# Patient Record
Sex: FEMALE | Race: WHITE | NOT HISPANIC OR LATINO | Employment: UNEMPLOYED | URBAN - METROPOLITAN AREA
[De-identification: names, ages, dates, MRNs, and addresses within clinical notes are randomized per-mention and may not be internally consistent; named-entity substitution may affect disease eponyms.]

---

## 2017-03-28 ENCOUNTER — OFFICE VISIT (OUTPATIENT)
Dept: INTERNAL MEDICINE | Facility: CLINIC | Age: 52
End: 2017-03-28
Payer: COMMERCIAL

## 2017-03-28 VITALS
DIASTOLIC BLOOD PRESSURE: 80 MMHG | WEIGHT: 212 LBS | BODY MASS INDEX: 32.13 KG/M2 | SYSTOLIC BLOOD PRESSURE: 110 MMHG | TEMPERATURE: 98.1 F | HEART RATE: 76 BPM | HEIGHT: 68 IN | OXYGEN SATURATION: 98 %

## 2017-03-28 DIAGNOSIS — L81.4 SOLAR LENTIGO: Primary | ICD-10-CM

## 2017-03-28 DIAGNOSIS — N95.2 VAGINAL ATROPHY: ICD-10-CM

## 2017-03-28 PROCEDURE — 99213 OFFICE O/P EST LOW 20 MIN: CPT | Performed by: INTERNAL MEDICINE

## 2017-03-28 RX ORDER — ESTRADIOL 0.1 MG/G
2 CREAM VAGINAL
Qty: 70 G | Refills: 12 | Status: SHIPPED | OUTPATIENT
Start: 2017-03-30 | End: 2018-05-30

## 2017-03-28 NOTE — PROGRESS NOTES
"  SUBJECTIVE:                                                    Joycelyn Laird is a 51 year old female who presents to clinic today for the following health issues:      1. Right cheek discoloration:  Possibly 2 spots, one near lateral eye, not sure if another lower on the cheek, over 6 months or so. Seems stable, can't feel it.   2. Questions about vaginal atrophy. She has not been regular with estrace cream. She does have discomfort, recently had a little mucus with streaks of blood. No other bleeding or dark discharge.           Patient Active Problem List   Diagnosis     CARDIOVASCULAR SCREENING; LDL GOAL LESS THAN 160     Primary localized osteoarthrosis, ankle and foot     Fatty liver     Eczema     Current Outpatient Prescriptions   Medication Sig Dispense Refill     Flaxseed, Linseed, (FLAXSEED OIL) 1200 MG CAPS Take by mouth every other day        estradiol (ESTRACE VAGINAL) 0.1 MG/GM vaginal cream Place 2 g vaginally twice a week 70 g 12     fish oil-omega-3 fatty acids (OMEGA 3) 1000 MG capsule Take 1 g by mouth every other day  90 capsule 3     MULTIVITAMIN TABS   OR        clobetasol (TEMOVATE) 0.05 % cream Apply topically as needed       fluocinonide (LIDEX) 0.05 % cream Apply small amount to affected area bid as needed 30 g 2            Reviewed and updated as needed this visit by clinical staff  Tobacco  Allergies  Problems  Med Hx  Surg Hx  Fam Hx  Soc Hx      Reviewed and updated as needed this visit by Provider         ROS:  negative    OBJECTIVE:                                                    /80  Pulse 76  Temp 98.1  F (36.7  C) (Oral)  Ht 5' 8\" (1.727 m)  Wt 212 lb (96.2 kg)  LMP 10/29/2011  SpO2 98%  BMI 32.23 kg/m2  Body mass index is 32.23 kg/(m^2).    Brown macule right face near outer eye  No other discolorations       ASSESSMENT/PLAN:                                                            1. Vaginal atrophy  Discussed this problem, various treatments and that " the cream has to be used consistently. She will restart.   - estradiol (ESTRACE VAGINAL) 0.1 MG/GM cream; Place 2 g vaginally twice a week  Dispense: 70 g; Refill: 12    2. Solar lentigo  Reassured benign, if any lesions become rough, refer derm.           Gauri Early MD  [facility]

## 2017-03-28 NOTE — NURSING NOTE
"Chief Complaint   Patient presents with     Derm Problem       Initial /80  Pulse 76  Temp 98.1  F (36.7  C) (Oral)  Ht 5' 8\" (1.727 m)  Wt 212 lb (96.2 kg)  LMP 10/29/2011  SpO2 98%  BMI 32.23 kg/m2 Estimated body mass index is 32.23 kg/(m^2) as calculated from the following:    Height as of this encounter: 5' 8\" (1.727 m).    Weight as of this encounter: 212 lb (96.2 kg).  Medication Reconciliation: complete    "

## 2017-03-28 NOTE — MR AVS SNAPSHOT
"              After Visit Summary   3/28/2017    Joycelyn Laird    MRN: [id number]           Patient Information     Date Of Birth          1965        Visit Information        Provider Department      3/28/2017 10:00 AM Gauri Early MD [facility]        Today's Diagnoses     Solar lentigo    -  1    Vaginal atrophy           Follow-ups after your visit        Who to contact     If you have questions or need follow up information about today's clinic visit or your schedule please contact [facility] directly at [phone].  Normal or non-critical lab and imaging results will be communicated to you by MyChart, letter or phone within 4 business days after the clinic has received the results. If you do not hear from us within 7 days, please contact the clinic through [employer] or phone. If you have a critical or abnormal lab result, we will notify you by phone as soon as possible.  Submit refill requests through [employer] or call your pharmacy and they will forward the refill request to us. Please allow 3 business days for your refill to be completed.          Additional Information About Your Visit        MyChart Information     [employer] gives you secure access to your electronic health record. If you see a primary care provider, you can also send messages to your care team and make appointments. If you have questions, please call your primary care clinic.  If you do not have a primary care provider, please call [phone] and they will assist you.        Care EveryWhere ID     This is your Care EveryWhere ID. This could be used by other organizations to access your [place] medical records  [id number]        Your Vitals Were     Pulse Temperature Height Last Period Pulse Oximetry BMI (Body Mass Index)    76 98.1  F (36.7  C) (Oral) 5' 8\" (1.727 m) 10/29/2011 98% 32.23 kg/m2       Blood Pressure from Last 3 Encounters:   03/28/17 110/80   07/18/16 90/70   10/06/15 120/84    " Weight from Last 3 Encounters:   03/28/17 212 lb (96.2 kg)   07/18/16 220 lb (99.8 kg)   10/06/15 221 lb (100.2 kg)              Today, you had the following     No orders found for display         Where to get your medicines      These medications were sent to [id number] - [place], [place] - [address] [facility] [address] access [address], [place] [address]    Hours:  24-hours Phone:  [phone]     estradiol 0.1 MG/GM cream          Primary Care Provider Office Phone # Fax #    Gauri Early [phone]       [facility] [address] [facility] [address]  [facility] [address]        Thank you!     Thank you for choosing [facility]  for your care. Our goal is always to provide you with excellent care. Hearing back from our patients is one way we can continue to improve our services. Please take a few minutes to complete the written survey that you may receive in the mail after your visit with us. Thank you!             Your Updated Medication List - Protect others around you: Learn how to safely use, store and throw away your medicines at www.disposemymeds.org.          This list is accurate as of: 3/28/17  1:31 PM.  Always use your most recent med list.                   Brand Name Dispense Instructions for use    clobetasol 0.05 % cream    TEMOVATE     Apply topically as needed       estradiol 0.1 MG/GM cream   Start taking on:  3/30/2017    ESTRACE VAGINAL    70 g    Place 2 g vaginally twice a week       fish oil-omega-3 fatty acids 1000 MG capsule     90 capsule    Take 1 g by mouth every other day       Flaxseed Oil 1200 MG Caps      Take by mouth every other day       fluocinonide 0.05 % cream    LIDEX    30 g    Apply small amount to affected area bid as needed       MULTIVITAMIN TABS   OR

## 2017-05-12 ENCOUNTER — OFFICE VISIT (OUTPATIENT)
Dept: FAMILY MEDICINE | Facility: CLINIC | Age: 52
End: 2017-05-12
Payer: COMMERCIAL

## 2017-05-12 VITALS
TEMPERATURE: 98.1 F | SYSTOLIC BLOOD PRESSURE: 120 MMHG | BODY MASS INDEX: 32.43 KG/M2 | DIASTOLIC BLOOD PRESSURE: 84 MMHG | OXYGEN SATURATION: 98 % | HEART RATE: 71 BPM | HEIGHT: 68 IN | WEIGHT: 214 LBS

## 2017-05-12 DIAGNOSIS — H81.399 PERIPHERAL VERTIGO, UNSPECIFIED LATERALITY: Primary | ICD-10-CM

## 2017-05-12 PROCEDURE — 99213 OFFICE O/P EST LOW 20 MIN: CPT | Performed by: FAMILY MEDICINE

## 2017-05-12 RX ORDER — ONDANSETRON 4 MG/1
4 TABLET, FILM COATED ORAL EVERY 8 HOURS PRN
Qty: 15 TABLET | Refills: 1 | Status: SHIPPED | OUTPATIENT
Start: 2017-05-12 | End: 2017-11-02

## 2017-05-12 RX ORDER — MECLIZINE HYDROCHLORIDE 25 MG/1
25 TABLET ORAL EVERY 6 HOURS PRN
Qty: 30 TABLET | Refills: 1 | COMMUNITY
Start: 2017-05-12 | End: 2017-11-02

## 2017-05-12 NOTE — MR AVS SNAPSHOT
After Visit Summary   5/12/2017    Joycelyn Laird    MRN: [id number]           Patient Information     Date Of Birth          1965        Visit Information        Provider Department      5/12/2017 9:00 AM Kvein Nguyen MD [facility]        Today's Diagnoses     Peripheral vertigo, unspecified laterality    -  1      Care Instructions                Positional Vertigo          What is positional vertigo?   Positional vertigo is an inner ear problem. It causes brief but sometimes severe feelings of spinning. Some people feel that their head or body is spinning. Others feel the room is spinning. People often say they are dizzy, but dizzy is a very general term. Vertigo, on the other hand, is the very specific feeling of uncontrollable spinning.   Symptoms of positional vertigo happen suddenly when you change the position of your head.   How does it occur?   In the inner part of your ear are 3 semicircular canals. Movement of the fluid in these canals helps your brain maintain your balance and know what position you are in (for example, standing up, lying down, or standing on your head).   Sometimes small crystals of calcium develop and float in the fluid in the inner ear. This can happen after a head injury, with a severe cold, or simply as a part of normal aging. The crystals can cause vertigo when you change head position and they strike against nerve endings in the semicircular canals. Usually the calcium crystals dissolve in a few weeks and stop causing vertigo. However, sometimes the crystals do not dissolve and the vertigo returns from time to time.   What are the symptoms?   A sudden feeling that you are spinning, or that the room is spinning, is the main symptom. You may feel the vertigo when you first wake up. It may seem that any turn of your head brings on brief but intense spells of vertigo. It may happen when you tilt your head, look up or down, or roll over in bed.    You may have nausea and vomiting along with the vertigo. Even if a spell of vertigo is brief, you may have a feeling of queasiness for several minutes or even hours afterward.   How is it diagnosed?   Your healthcare provider will ask about your symptoms and examine you. You may also be given a Panna Maria-Hallpike position test.   You start the Panna Maria-Hallpike test by sitting upright on the examining table. Your healthcare provider slowly brings your head down over the edge of the table and turns your head to one side. If you have positional vertigo, your provider will see your eyes making fast, jerky movements called nystagmus. If no nystagmus is seen, your provider will repeat the test, this time turning your head to the opposite side, to test the other inner ear. If you then have nystagmus and vertigo, the ear that is pointing toward the floor is the one causing the problem. The nystagmus and vertigo will slow down and stop after 15 to 20 seconds. If you do not move your head, no more symptoms will occur. When you sit back up, you will have vertigo again, but for a shorter time.   Other tests you may have are:   an ear exam   an audiogram to check your hearing   a test of your nerve responses   an electronystagmogram (ENG) test.   How is it treated?   Mild vertigo is often treated with medicine. The most common medicine for this problem is meclizine. It is taken up to 4 times a day for the vertigo and nausea or vomiting. One of the problems with this medicine is that it causes drowsiness. This is not as much of a problem if you have severe vertigo, which usually requires bed rest. Then the medicine can help you sleep and get relief from the vertigo while you sleep.   Your healthcare provider may recommend techniques that use gravity to move the crystals away from the nerve endings into an area of the inner ear that won't cause any problems. These are called repositioning techniques.   One repositioning technique is the  Epley maneuver. It can be very helpful. Your healthcare provider will move your head into 4 positions. You will hold each position for about 30 seconds.   Your healthcare provider may also suggest that you do Etienne-Daroff exercises. Your provider may recommend that you do these exercises 3 times a day for 2 weeks. To do these exercises:   Start by sitting upright on your bed.   Lie on your left side, with your head angled upward about senior care. (Imagine that you are looking at the head of someone standing about 6 feet in front of you.) Stay in this position for 30 seconds, or, if you are having vertigo, until the vertigo stops.   Return to the sitting position for 30 seconds.   Lie on your right side, and follow the same routine.   Your healthcare provider may refer you to a physical therapist to learn and practice these repositioning techniques.   Rarely, when repositioning techniques don't help and the vertigo has not gone away after a few weeks, severe cases may eventually require surgery.   How long will the effects last?   Even without treatment, positional vertigo usually goes away within several weeks. Sometimes it recurs despite treatment.   How do I take care of myself?   If your vertigo is mild, you may be able to continue your usual activities, especially if you have opportunities to sit and rest when you have vertigo.   If your vertigo does not allow you to continue your usual routine, you should rest at home.   Use medicine as prescribed by your healthcare provider to help stop symptoms of dizziness, nausea, and vomiting.   Follow your instructions for using the repositioning techniques.   Do not try to drive, operate tools or machinery, or do other tasks, even cooking, that could endanger yourself or others if you suddenly become dizzy.   Follow your healthcare provider's recommendations for follow-up visits.   Contact your healthcare provider if:   Your symptoms seem to be getting worse, more  frequent, or longer lasting.   You develop new symptoms, such as a loss of hearing or severe headache.     Published by [employer].  This content is reviewed periodically and is subject to change as new health information becomes available. The information is intended to inform and educate and is not a replacement for medical evaluation, advice, diagnosis or treatment by a healthcare professional.   Developed by [employer].   ? 2010 [facility] and/or its affiliates. All Rights Reserved.   Copyright   [employer] Systems 2011  Adult Health Advisor               Follow-ups after your visit        Who to contact     If you have questions or need follow up information about today's clinic visit or your schedule please contact [facility] directly at [phone].  Normal or non-critical lab and imaging results will be communicated to you by MyChart, letter or phone within 4 business days after the clinic has received the results. If you do not hear from us within 7 days, please contact the clinic through [employer] or phone. If you have a critical or abnormal lab result, we will notify you by phone as soon as possible.  Submit refill requests through [employer] or call your pharmacy and they will forward the refill request to us. Please allow 3 business days for your refill to be completed.          Additional Information About Your Visit        MyChart Information     [employer] gives you secure access to your electronic health record. If you see a primary care provider, you can also send messages to your care team and make appointments. If you have questions, please call your primary care clinic.  If you do not have a primary care provider, please call [phone] and they will assist you.        Care EveryWhere ID     This is your Care EveryWhere ID. This could be used by other organizations to access your [place] medical records  [id number]        Your Vitals Were     Pulse Temperature Height  "Last Period Pulse Oximetry BMI (Body Mass Index)    71 98.1  F (36.7  C) (Oral) 5' 8\" (1.727 m) 10/29/2011 98% 32.54 kg/m2       Blood Pressure from Last 3 Encounters:   05/12/17 120/84   03/28/17 110/80   07/18/16 90/70    Weight from Last 3 Encounters:   05/12/17 214 lb (97.1 kg)   03/28/17 212 lb (96.2 kg)   07/18/16 220 lb (99.8 kg)              Today, you had the following     No orders found for display         Today's Medication Changes          These changes are accurate as of: 5/12/17  9:23 AM.  If you have any questions, ask your nurse or doctor.               Start taking these medicines.        Dose/Directions    meclizine 25 MG tablet   Commonly known as:  ANTIVERT   Used for:  Peripheral vertigo, unspecified laterality   Started by:  Kevin Nguyen MD        Dose:  25 mg   Take 1 tablet (25 mg) by mouth every 6 hours as needed for dizziness   Quantity:  30 tablet   Refills:  1       ondansetron 4 MG tablet   Commonly known as:  ZOFRAN   Used for:  Peripheral vertigo, unspecified laterality   Started by:  Kevin Nguyen MD        Dose:  4 mg   Take 1 tablet (4 mg) by mouth every 8 hours as needed for nausea   Quantity:  15 tablet   Refills:  1            Where to get your medicines      These medications were sent to [facility] [address]  [address] [address]     Phone:  [phone]     ondansetron 4 MG tablet         Some of these will need a paper prescription and others can be bought over the counter.  Ask your nurse if you have questions.     You don't need a prescription for these medications     meclizine 25 MG tablet                Primary Care Provider Office Phone # Fax #    Gauri Early [phone]       [facility] [address] [facility] [address]  [facility] [address]        Thank you!     Thank you for choosing [facility]  for your care. Our goal is always to provide you " with excellent care. Hearing back from our patients is one way we can continue to improve our services. Please take a few minutes to complete the written survey that you may receive in the mail after your visit with us. Thank you!             Your Updated Medication List - Protect others around you: Learn how to safely use, store and throw away your medicines at www.disposemymeds.org.          This list is accurate as of: 5/12/17  9:23 AM.  Always use your most recent med list.                   Brand Name Dispense Instructions for use    clobetasol 0.05 % cream    TEMOVATE     Apply topically as needed       estradiol 0.1 MG/GM cream    ESTRACE VAGINAL    70 g    Place 2 g vaginally twice a week       fish oil-omega-3 fatty acids 1000 MG capsule     90 capsule    Take 1 g by mouth every other day       Flaxseed Oil 1200 MG Caps      Take by mouth every other day       fluocinonide 0.05 % cream    LIDEX    30 g    Apply small amount to affected area bid as needed       meclizine 25 MG tablet    ANTIVERT    30 tablet    Take 1 tablet (25 mg) by mouth every 6 hours as needed for dizziness       MULTIVITAMIN TABS   OR          ondansetron 4 MG tablet    ZOFRAN    15 tablet    Take 1 tablet (4 mg) by mouth every 8 hours as needed for nausea

## 2017-05-12 NOTE — NURSING NOTE
"Chief Complaint   Patient presents with     Dizziness       Initial /84 (BP Location: Left arm, Patient Position: Chair, Cuff Size: Adult Large)  Pulse 71  Temp 98.1  F (36.7  C) (Oral)  Ht 5' 8\" (1.727 m)  Wt 214 lb (97.1 kg)  LMP 10/29/2011  SpO2 98%  BMI 32.54 kg/m2 Estimated body mass index is 32.54 kg/(m^2) as calculated from the following:    Height as of this encounter: 5' 8\" (1.727 m).    Weight as of this encounter: 214 lb (97.1 kg).  Medication Reconciliation: complete  "

## 2017-05-12 NOTE — PROGRESS NOTES
SUBJECTIVE:                                                    Joycelyn Laird is a 51 year old female who presents to clinic today for the following health issues:    Dizziness     Onset: today, came on suddenly    Description:   Do you feel faint:  YES- more dizzy  Does it feel like the surroundings (bed, room) are moving: YES  Unsteady/off balance: YES  Have you passed out or fallen: YES  Did you go to bed last night feeling dizzy?: no  Dizziness worsened with movement: YES    Intensity: moderate    Progression of Symptoms:  Pt feels better than this morning    Accompanying Signs & Symptoms:  Heart palpitations: no   Nausea, vomiting: YES, this morning  Weakness in arms or legs: no   Fatigue: YES  Vision or speech changes: YES  Ringing in ears (Tinnitus): YES, always has had some tinnitus  Hearing Loss: YES, runs in family  Fever: no   History:   Head trauma/concussion hx: no   Previous similar symptoms: no   Recent bleeding history: no     Precipitating factors:   Worse with activity or head movement: YES  Any new medications (BP?): no   Alcohol/drug abuse/withdrawal: no     Alleviating factors:   Does staying in a fixed position give relief:  YES       Therapies Tried and outcome: neck stretches- mild relief    Problem list and histories reviewed & adjusted, as indicated.  Additional history: as documented    ROS:  Constitutional, HEENT, cardiovascular, pulmonary, GI, , musculoskeletal, neuro, skin, endocrine and psych systems are negative, except as otherwise noted.    This document serves as a record of the services and decisions personally performed and made by Kevin Nguyen MD. It was created on his behalf by Kaia Gallegos, a trained medical scribe. The creation of this document is based the provider's statements to the medical scribe.  Kaia Gallegos   OBJECTIVE:                                                    /84 (BP Location: Left arm, Patient Position: Chair, Cuff Size: Adult Large)   "Pulse 71  Temp 98.1  F (36.7  C) (Oral)  Ht 1.727 m (5' 8\")  Wt 97.1 kg (214 lb)  LMP 10/29/2011  SpO2 98%  BMI 32.54 kg/m2 Body mass index is 32.54 kg/(m^2).   GENERAL: healthy, alert, well nourished, well hydrated, no distress  EYES: Eyes grossly normal to inspection, extraocular movements - intact  HENT: ear canals- normal; TMs- normal; Nose- normal; Mouth- no ulcers, no lesions  NECK: no tenderness, no adenopathy, no asymmetry, no masses, no stiffness; thyroid- normal to palpation  NEURO: increased vertigo symptoms with laying down to the right, otherwise, strength and tone- normal, sensory exam- grossly normal, mentation- intact, speech- normal, reflexes- symmetric  PSYCH: Alert and oriented times 3; speech- coherent , normal rate and volume; able to articulate logical thoughts, able to abstract reason, no tangential thoughts, no hallucinations or delusions, affect- normal  Diagnostic test results: none      ASSESSMENT/PLAN:         Joycelyn was seen today for dizziness.    Diagnoses and all orders for this visit:    Peripheral vertigo, unspecified laterality: Advised to move slowly, take meclizine, and take antinausea medication as needed.   -     ondansetron (ZOFRAN) 4 MG tablet; Take 1 tablet (4 mg) by mouth every 8 hours as needed for nausea  -     meclizine (ANTIVERT) 25 MG tablet; Take 1 tablet (25 mg) by mouth every 6 hours as needed for dizziness    Risks, benefits and alternatives of treatments discussed. Plan agreed on.      Followup: prn    Will call, return to clinic, or go to ED if worsening or symptoms not improving as discussed.    See patient instructions.     BMI:   Estimated body mass index is 32.54 kg/(m^2) as calculated from the following:    Height as of this encounter: 1.727 m (5' 8\").    Weight as of this encounter: 97.1 kg (214 lb).   Weight management plan: Discussed healthy diet and exercise guidelines and patient will follow up in 6 months in clinic to re-evaluate.      The patient " has no Health Maintenance topics of status Overdue, Due On, or Due Soon    Health maintenance reviewed/updated? Yes    The information in this document, created by the medical scribe for me, accurately reflects the services I personally performed and the decisions made by me. I have reviewed and approved this document for accuracy prior to leaving the patient care area.  Kevin Nguyen MD May 12, 2017 7:39 AM        Tacos Nguyen MD

## 2017-05-12 NOTE — PATIENT INSTRUCTIONS
Positional Vertigo          What is positional vertigo?   Positional vertigo is an inner ear problem. It causes brief but sometimes severe feelings of spinning. Some people feel that their head or body is spinning. Others feel the room is spinning. People often say they are dizzy, but dizzy is a very general term. Vertigo, on the other hand, is the very specific feeling of uncontrollable spinning.   Symptoms of positional vertigo happen suddenly when you change the position of your head.   How does it occur?   In the inner part of your ear are 3 semicircular canals. Movement of the fluid in these canals helps your brain maintain your balance and know what position you are in (for example, standing up, lying down, or standing on your head).   Sometimes small crystals of calcium develop and float in the fluid in the inner ear. This can happen after a head injury, with a severe cold, or simply as a part of normal aging. The crystals can cause vertigo when you change head position and they strike against nerve endings in the semicircular canals. Usually the calcium crystals dissolve in a few weeks and stop causing vertigo. However, sometimes the crystals do not dissolve and the vertigo returns from time to time.   What are the symptoms?   A sudden feeling that you are spinning, or that the room is spinning, is the main symptom. You may feel the vertigo when you first wake up. It may seem that any turn of your head brings on brief but intense spells of vertigo. It may happen when you tilt your head, look up or down, or roll over in bed.   You may have nausea and vomiting along with the vertigo. Even if a spell of vertigo is brief, you may have a feeling of queasiness for several minutes or even hours afterward.   How is it diagnosed?   Your healthcare provider will ask about your symptoms and examine you. You may also be given a Servando-Hallpike position test.   You start the Temple-Hallpike test by sitting upright  on the examining table. Your healthcare provider slowly brings your head down over the edge of the table and turns your head to one side. If you have positional vertigo, your provider will see your eyes making fast, jerky movements called nystagmus. If no nystagmus is seen, your provider will repeat the test, this time turning your head to the opposite side, to test the other inner ear. If you then have nystagmus and vertigo, the ear that is pointing toward the floor is the one causing the problem. The nystagmus and vertigo will slow down and stop after 15 to 20 seconds. If you do not move your head, no more symptoms will occur. When you sit back up, you will have vertigo again, but for a shorter time.   Other tests you may have are:   an ear exam   an audiogram to check your hearing   a test of your nerve responses   an electronystagmogram (ENG) test.   How is it treated?   Mild vertigo is often treated with medicine. The most common medicine for this problem is meclizine. It is taken up to 4 times a day for the vertigo and nausea or vomiting. One of the problems with this medicine is that it causes drowsiness. This is not as much of a problem if you have severe vertigo, which usually requires bed rest. Then the medicine can help you sleep and get relief from the vertigo while you sleep.   Your healthcare provider may recommend techniques that use gravity to move the crystals away from the nerve endings into an area of the inner ear that won't cause any problems. These are called repositioning techniques.   One repositioning technique is the Epley maneuver. It can be very helpful. Your healthcare provider will move your head into 4 positions. You will hold each position for about 30 seconds.   Your healthcare provider may also suggest that you do Etienne-Daroff exercises. Your provider may recommend that you do these exercises 3 times a day for 2 weeks. To do these exercises:   Start by sitting upright on your bed.    Lie on your left side, with your head angled upward about FDC. (Imagine that you are looking at the head of someone standing about 6 feet in front of you.) Stay in this position for 30 seconds, or, if you are having vertigo, until the vertigo stops.   Return to the sitting position for 30 seconds.   Lie on your right side, and follow the same routine.   Your healthcare provider may refer you to a physical therapist to learn and practice these repositioning techniques.   Rarely, when repositioning techniques don't help and the vertigo has not gone away after a few weeks, severe cases may eventually require surgery.   How long will the effects last?   Even without treatment, positional vertigo usually goes away within several weeks. Sometimes it recurs despite treatment.   How do I take care of myself?   If your vertigo is mild, you may be able to continue your usual activities, especially if you have opportunities to sit and rest when you have vertigo.   If your vertigo does not allow you to continue your usual routine, you should rest at home.   Use medicine as prescribed by your healthcare provider to help stop symptoms of dizziness, nausea, and vomiting.   Follow your instructions for using the repositioning techniques.   Do not try to drive, operate tools or machinery, or do other tasks, even cooking, that could endanger yourself or others if you suddenly become dizzy.   Follow your healthcare provider's recommendations for follow-up visits.   Contact your healthcare provider if:   Your symptoms seem to be getting worse, more frequent, or longer lasting.   You develop new symptoms, such as a loss of hearing or severe headache.     Published by [employer].  This content is reviewed periodically and is subject to change as new health information becomes available. The information is intended to inform and educate and is not a replacement for medical evaluation, advice, diagnosis or treatment by a healthcare  professional.   Developed by [employer].   ? 2010 [employer] and/or its affiliates. All Rights Reserved.   Copyright   [employer] 2011  Adult Health Advisor

## 2017-05-13 ENCOUNTER — TELEPHONE (OUTPATIENT)
Dept: FAMILY MEDICINE | Facility: CLINIC | Age: 52
End: 2017-05-13

## 2017-05-13 DIAGNOSIS — H81.399 PERIPHERAL VERTIGO, UNSPECIFIED LATERALITY: Primary | ICD-10-CM

## 2017-05-13 NOTE — TELEPHONE ENCOUNTER
Reason for Call:  Joycelyn saw Dr Nguyen yesterday for vertigo. They discussed possibly placing a physical therapy referral. Joycelyn would like to pursue that plan. She would like to go to one today as she is leaving town later today or tomorrow. Advised that would be highly unlikely on such short notice. Dr Nguyen is in the office today. Will also send "[employer]" message to review this encounter and advise.    Best phone number to reach pt at is: [phone]  Ok to leave a message with medical info? yes    Leslie Mandel  Patient Representative

## 2017-07-21 ENCOUNTER — HOSPITAL ENCOUNTER (OUTPATIENT)
Dept: MAMMOGRAPHY | Facility: CLINIC | Age: 52
Discharge: HOME | End: 2017-07-21
Attending: INTERNAL MEDICINE | Admitting: INTERNAL MEDICINE
Payer: COMMERCIAL

## 2017-07-21 DIAGNOSIS — Z12.31 VISIT FOR SCREENING MAMMOGRAM: ICD-10-CM

## 2017-07-21 PROCEDURE — G0202 SCR MAMMO BI INCL CAD: HCPCS

## 2017-11-02 ENCOUNTER — OFFICE VISIT (OUTPATIENT)
Dept: INTERNAL MEDICINE | Facility: CLINIC | Age: 52
End: 2017-11-02
Payer: COMMERCIAL

## 2017-11-02 VITALS
DIASTOLIC BLOOD PRESSURE: 89 MMHG | HEART RATE: 85 BPM | RESPIRATION RATE: 16 BRPM | OXYGEN SATURATION: 97 % | SYSTOLIC BLOOD PRESSURE: 128 MMHG | HEIGHT: 68 IN | TEMPERATURE: 98.3 F | BODY MASS INDEX: 34.95 KG/M2 | WEIGHT: 230.6 LBS

## 2017-11-02 DIAGNOSIS — Z00.00 ENCOUNTER FOR ROUTINE ADULT HEALTH EXAMINATION WITHOUT ABNORMAL FINDINGS: Primary | ICD-10-CM

## 2017-11-02 DIAGNOSIS — Z12.11 SPECIAL SCREENING FOR MALIGNANT NEOPLASMS, COLON: ICD-10-CM

## 2017-11-02 DIAGNOSIS — N95.0 POST-MENOPAUSAL BLEEDING: ICD-10-CM

## 2017-11-02 DIAGNOSIS — L30.9 ECZEMA, UNSPECIFIED TYPE: ICD-10-CM

## 2017-11-02 DIAGNOSIS — B07.0 PLANTAR WART: ICD-10-CM

## 2017-11-02 PROCEDURE — 80061 LIPID PANEL: CPT | Performed by: INTERNAL MEDICINE

## 2017-11-02 PROCEDURE — 99396 PREV VISIT EST AGE 40-64: CPT | Performed by: INTERNAL MEDICINE

## 2017-11-02 PROCEDURE — 80053 COMPREHEN METABOLIC PANEL: CPT | Performed by: INTERNAL MEDICINE

## 2017-11-02 PROCEDURE — 36415 COLL VENOUS BLD VENIPUNCTURE: CPT | Performed by: INTERNAL MEDICINE

## 2017-11-02 RX ORDER — FLUOCINONIDE 0.5 MG/G
CREAM TOPICAL
Qty: 30 G | Refills: 2 | Status: SHIPPED | OUTPATIENT
Start: 2017-11-02 | End: 2024-04-10

## 2017-11-02 NOTE — MR AVS SNAPSHOT
After Visit Summary   11/2/2017    Joycelyn Laird    MRN: [id number]           Patient Information     Date Of Birth          1965        Visit Information        Provider Department      11/2/2017 8:40 AM Gauri Early MD [facility]        Today's Diagnoses     Encounter for routine adult health examination without abnormal findings    -  1    Special screening for malignant neoplasms, colon        Eczema, unspecified type        Post-menopausal bleeding          Care Instructions      PREVENTIVE HEALTH RECOMMENDATIONS:     Vaccines: Get a flu shot each year. Get a tetanus shot every 10 years.     Exercise for at least 150 minutes a week (an average of 30 minutes a day, 5 days of the week). This will help you control your weight and prevent disease.    Limit alcohol to one drink per day.    No smoking.     Wear sunscreen to prevent skin cancer.     See your dentist twice a year for an exam and cleaning.    Try to get Calcium 1200 mg total per day. It is best to not take it all at once. Try to get Vitamin D at least 1000 units per day.    BMI or Body Mass Index is a way of indicating weight and health risk for cardiovascular diseases, high blood pressure, diabetes.   Definitions:    Underweight is less than 18.5 and will be associated with health risk.   Normal BMI is 18.5 to 25   Overweight is 25-29   Obesity is 30 or greater   Morbid Obesity is 40 or greater or 35 or greater with diabetes, high blood pressure or elevated cholesterol  Obesity and Morbid Obesity are associated with higher health risks. Lowering calories, exercising more may lower your BMI and even small decreases can have positive impact on lowering health risks.   Your Body mass index is 35.06 kg/(m^2)..             Follow-ups after your visit        Additional Services     GASTROENTEROLOGY ADULT REF PROCEDURE ONLY       Last Lab Result: Creatinine (mg/dL)       Date                     Value                  07/18/2016               0.70             ----------  There is no height or weight on file to calculate BMI.     Needed:  No  Language:  English    Patient will be contacted to schedule procedure.     Please be aware that coverage of these services is subject to the terms and limitations of your health insurance plan.  Call member services at your health plan with any benefit or coverage questions.  Any procedures must be performed at a [place] facility OR coordinated by your clinic's referral office.    Please bring the following with you to your appointment:    (1) Any X-Rays, CTs or MRIs which have been performed.  Contact the facility where they were done to arrange for  prior to your scheduled appointment.    (2) List of current medications   (3) This referral request   (4) Any documents/labs given to you for this referral                  Future tests that were ordered for you today     Open Future Orders        Priority Expected Expires Ordered    US Pelvic Complete w Transvaginal Routine  11/2/2018 11/2/2017            Who to contact     If you have questions or need follow up information about today's clinic visit or your schedule please contact [facility] directly at [phone].  Normal or non-critical lab and imaging results will be communicated to you by MyChart, letter or phone within 4 business days after the clinic has received the results. If you do not hear from us within 7 days, please contact the clinic through [employer] or phone. If you have a critical or abnormal lab result, we will notify you by phone as soon as possible.  Submit refill requests through [employer] or call your pharmacy and they will forward the refill request to us. Please allow 3 business days for your refill to be completed.          Additional Information About Your Visit        [employer] Information     [employer] gives you secure access to your electronic health record. If you see a primary care  "provider, you can also send messages to your care team and make appointments. If you have questions, please call your primary care clinic.  If you do not have a primary care provider, please call [phone] and they will assist you.        Care EveryWhere ID     This is your Care EveryWhere ID. This could be used by other organizations to access your [facility] medical records  [id number]        Your Vitals Were     Pulse Temperature Respirations Height Last Period Pulse Oximetry    85 98.3  F (36.8  C) (Oral) 16 5' 8\" (1.727 m) 10/29/2011 97%    BMI (Body Mass Index)                   35.06 kg/m2            Blood Pressure from Last 3 Encounters:   11/02/17 128/90   05/12/17 120/84   03/28/17 110/80    Weight from Last 3 Encounters:   11/02/17 230 lb 9.6 oz (104.6 kg)   05/12/17 214 lb (97.1 kg)   03/28/17 212 lb (96.2 kg)              We Performed the Following     Comprehensive metabolic panel     GASTROENTEROLOGY ADULT REF PROCEDURE ONLY     Lipid panel reflex to direct LDL Fasting          Today's Medication Changes          These changes are accurate as of: 11/2/17  9:18 AM.  If you have any questions, ask your nurse or doctor.               Stop taking these medicines if you haven't already. Please contact your care team if you have questions.     meclizine 25 MG tablet   Commonly known as:  ANTIVERT   Stopped by:  Gauri Early MD           ondansetron 4 MG tablet   Commonly known as:  ZOFRAN   Stopped by:  Gauri Early MD                Where to get your medicines      These medications were sent to [facility] [id number] [place] [address] [address] AT [facility] [address], [facility] [address]    Hours:  24-hours Phone:  [phone]     fluocinonide 0.05 % cream                Primary Care Provider Office Phone # Fax #    Gauri Early [phone]       [address] [place] [address]        Equal Access to Services     REEMA THRASHER " AH: Alex healy Myrakevin, wajackelinda luqadaha, qaybta kamanish jmmagnoliadreasofia joel hayumer ledesmaeliseolizzie ludwig. So [facility] [phone].    ATENCIÓN: Si habla español, tiene a alonzo disposición servicios gratuitos de asistencia lingüística. Llame al [phone].    We comply with applicable federal civil rights laws and [place] laws. We do not discriminate on the basis of race, color, national origin, age, disability, sex, sexual orientation, or gender identity.            Thank you!     Thank you for choosing [facility]  for your care. Our goal is always to provide you with excellent care. Hearing back from our patients is one way we can continue to improve our services. Please take a few minutes to complete the written survey that you may receive in the mail after your visit with us. Thank you!             Your Updated Medication List - Protect others around you: Learn how to safely use, store and throw away your medicines at www.disposemymeds.org.          This list is accurate as of: 11/2/17  9:18 AM.  Always use your most recent med list.                   Brand Name Dispense Instructions for use Diagnosis    clobetasol 0.05 % cream    TEMOVATE     Apply topically as needed        estradiol 0.1 MG/GM cream    ESTRACE VAGINAL    70 g    Place 2 g vaginally twice a week    Vaginal atrophy       fish oil-omega-3 fatty acids 1000 MG capsule     90 capsule    Take 1 g by mouth every other day        Flaxseed Oil 1200 MG Caps      Take by mouth every other day        fluocinonide 0.05 % cream    LIDEX    30 g    Apply small amount to affected area bid as needed    Eczema, unspecified type       MULTIVITAMIN TABS   OR

## 2017-11-02 NOTE — NURSING NOTE
"Chief Complaint   Patient presents with     Physical     Fasting        Initial /90  Pulse 85  Temp 98.3  F (36.8  C) (Oral)  Resp 16  Ht 5' 8\" (1.727 m)  Wt 230 lb 9.6 oz (104.6 kg)  LMP 10/29/2011  SpO2 97%  BMI 35.06 kg/m2 Estimated body mass index is 35.06 kg/(m^2) as calculated from the following:    Height as of this encounter: 5' 8\" (1.727 m).    Weight as of this encounter: 230 lb 9.6 oz (104.6 kg).  Medication Reconciliation: bob Espinal CMA    Discussed Health Maintenance:    Patient agreed to schedule Colonoscopy. Order have been place and information given to patient.       "

## 2017-11-02 NOTE — PATIENT INSTRUCTIONS
PREVENTIVE HEALTH RECOMMENDATIONS:     Vaccines: Get a flu shot each year. Get a tetanus shot every 10 years.     Exercise for at least 150 minutes a week (an average of 30 minutes a day, 5 days of the week). This will help you control your weight and prevent disease.    Limit alcohol to one drink per day.    No smoking.     Wear sunscreen to prevent skin cancer.     See your dentist twice a year for an exam and cleaning.    Try to get Calcium 1200 mg total per day. It is best to not take it all at once. Try to get Vitamin D at least 1000 units per day.    BMI or Body Mass Index is a way of indicating weight and health risk for cardiovascular diseases, high blood pressure, diabetes.   Definitions:    Underweight is less than 18.5 and will be associated with health risk.   Normal BMI is 18.5 to 25   Overweight is 25-29   Obesity is 30 or greater   Morbid Obesity is 40 or greater or 35 or greater with diabetes, high blood pressure or elevated cholesterol  Obesity and Morbid Obesity are associated with higher health risks. Lowering calories, exercising more may lower your BMI and even small decreases can have positive impact on lowering health risks.   Your Body mass index is 35.06 kg/(m^2)..

## 2017-11-02 NOTE — PROGRESS NOTES
SUBJECTIVE:   CC: Joycelyn Laird is an 52 year old woman who presents for preventive health visit.     Physical   Annual:     Getting at least 3 servings of Calcium per day::  Yes    Bi-annual eye exam::  Yes    Dental care twice a year::  Yes    Sleep apnea or symptoms of sleep apnea::  None    Diet::  Regular (no restrictions)    Frequency of exercise::  2-3 days/week    Duration of exercise::  15-30 minutes    Taking medications regularly::  Yes    Medication side effects::  Not applicable    Additional concerns today::  YES    Current concerns:  1. Wart on foot:has not treated it. Not really painful  2. Eczema hand: cream helps. She thought maybe she had allergy to ring but still present when not wearing it. She needs refill  3. Had 3 days of vaginal bleeding, light amount but dark. Using estrace 1 gram once a week.   4. Check skin growths on chest; not sure if tags or other.         Today's PHQ-2 Score: PHQ-2 ( 1999 Pfizer) 11/2/2017   Q1: Little interest or pleasure in doing things 0   Q2: Feeling down, depressed or hopeless 0   PHQ-2 Score 0   Q1: Little interest or pleasure in doing things Not at all   Q2: Feeling down, depressed or hopeless Not at all   PHQ-2 Score 0       Abuse: Current or Past(Physical, Sexual or Emotional)- No  Do you feel safe in your environment - Yes    Social History   Substance Use Topics     Smoking status: Never Smoker     Smokeless tobacco: Never Used     Alcohol use 0.0 oz/week     0 Standard drinks or equivalent per week      Comment: rare     The patient does not drink >3 drinks per day nor >7 drinks per week.    Reviewed orders with patient.  Reviewed health maintenance and updated orders accordingly - Yes      Patient over age 50, mutual decision to screen reflected in health maintenance.      Pertinent mammograms are reviewed under the imaging tab.  History of abnormal Pap smear: NO - age 30- 65 PAP every 3 years recommended    Reviewed and updated as needed this visit by  "clinical staff         Reviewed and updated as needed this visit by Provider          Review of Systems       Physical Exam    Patient Active Problem List   Diagnosis     CARDIOVASCULAR SCREENING; LDL GOAL LESS THAN 160     Primary localized osteoarthrosis, ankle and foot     Fatty liver     Eczema     Current Outpatient Prescriptions   Medication Sig Dispense Refill     estradiol (ESTRACE VAGINAL) 0.1 MG/GM cream Place 2 g vaginally twice a week 70 g 12     Flaxseed, Linseed, (FLAXSEED OIL) 1200 MG CAPS Take by mouth every other day        clobetasol (TEMOVATE) 0.05 % cream Apply topically as needed       fluocinonide (LIDEX) 0.05 % cream Apply small amount to affected area bid as needed 30 g 2     fish oil-omega-3 fatty acids (OMEGA 3) 1000 MG capsule Take 1 g by mouth every other day  90 capsule 3     MULTIVITAMIN TABS   OR         Review Of Systems  Skin: as above  Eyes: negative  Ears/Nose/Throat: negative  Respiratory: No shortness of breath and No cough  Cardiovascular: negative  Gastrointestinal: negative  Genitourinary: negative  Musculoskeletal: negative  Neurologic: negative  Psychiatric: negative  Hematologic/Lymphatic/Immunologic: negative  Endocrine: negative   Gynecologic ros reveals:no breast pain or new or enlarging lumps on self exam, she complains of episode vaginal bleeding as above    Objective:  Patient alert, in no acute distress  /89  Pulse 85  Temp 98.3  F (36.8  C) (Oral)  Resp 16  Ht 5' 8\" (1.727 m)  Wt 230 lb 9.6 oz (104.6 kg)  LMP 10/29/2011  SpO2 97%  BMI 35.06 kg/m2    HEENT: extraocular movements are intact, pupils equal and reactive to light and accommodation, TMs clear, oropharynx clear  NECK: Neck supple. No adenopathy. Thyroid symmetric, normal size,, Carotids without bruits.  PULMONARY: clear to auscultation  CARDIAC: regular rate and rhythm and no murmurs, clicks, or gallops  PULSES: 2/2 throughout  BACK: no spinal or CVAT  ABDOMINAL: Soft, nontender.  Normal bowel " "sounds.  No hepatosplenomegaly or abnormal masses  BREAST: No breast masses or tenderness, No axillary masses or tenderness and No galactorrhea  PELVIC: external genitalia normal, , bimanual exam with normal uterus and adnexa,  REFLEXES: 2+ throughout  SKIN: benign small tags on chest, mild erythema and scaling on hand  Wart on plantar foot     ASSESSMENT/PLAN:   1. Encounter for routine adult health examination without abnormal findings    - Comprehensive metabolic panel  - Lipid panel reflex to direct LDL Fasting    2. Eczema, unspecified type  Continue cream  - fluocinonide (LIDEX) 0.05 % cream; Apply small amount to affected area bid as needed  Dispense: 30 g; Refill: 2    3. Plantar wart  Advised on home treatment    4. Post-menopausal bleeding  She is on very low dose estrogen cream, recommend US  - US Pelvic Complete w Transvaginal; Future    5. Special screening for malignant neoplasms, colon    - GASTROENTEROLOGY ADULT REF PROCEDURE ONLY    COUNSELING:  Reviewed preventive health counseling, as reflected in patient instructions    BP Screening:   Last 3 BP Readings:    BP Readings from Last 3 Encounters:   11/02/17 128/89   05/12/17 120/84   03/28/17 110/80       The following was recommended to the patient:  Re-screen BP within a year and recommended lifestyle modifications     reports that she has never smoked. She has never used smokeless tobacco.    Estimated body mass index is 32.54 kg/(m^2) as calculated from the following:    Height as of 5/12/17: 5' 8\" (1.727 m).    Weight as of 5/12/17: 214 lb (97.1 kg).   Weight management plan: Discussed healthy diet and exercise guidelines and patient will follow up in 12 months in clinic to re-evaluate.    Counseling Resources:  ATP IV Guidelines  Pooled Cohorts Equation Calculator  Breast Cancer Risk Calculator  FRAX Risk Assessment  ICSI Preventive Guidelines  Dietary Guidelines for Americans, 2010  USDA's MyPlate  ASA Prophylaxis  Lung CA Screening    Gauri " MARTIN Early MD  [facility]  Answers for HPI/ROS submitted by the patient on 11/2/2017   PHQ-2 Score: 0

## 2017-11-03 LAB
ALBUMIN SERPL-MCNC: 4 G/DL (ref 3.4–5)
ALP SERPL-CCNC: 60 U/L (ref 40–150)
ALT SERPL W P-5'-P-CCNC: 100 U/L (ref 0–50)
ANION GAP SERPL CALCULATED.3IONS-SCNC: 8 MMOL/L (ref 3–14)
AST SERPL W P-5'-P-CCNC: 61 U/L (ref 0–45)
BILIRUB SERPL-MCNC: 0.4 MG/DL (ref 0.2–1.3)
BUN SERPL-MCNC: 16 MG/DL (ref 7–30)
CALCIUM SERPL-MCNC: 9.1 MG/DL (ref 8.5–10.1)
CHLORIDE SERPL-SCNC: 107 MMOL/L (ref 94–109)
CHOLEST SERPL-MCNC: 185 MG/DL
CO2 SERPL-SCNC: 26 MMOL/L (ref 20–32)
CREAT SERPL-MCNC: 0.73 MG/DL (ref 0.52–1.04)
GFR SERPL CREATININE-BSD FRML MDRD: 84 ML/MIN/1.7M2
GLUCOSE SERPL-MCNC: 102 MG/DL (ref 70–99)
HDLC SERPL-MCNC: 40 MG/DL
LDLC SERPL CALC-MCNC: 124 MG/DL
NONHDLC SERPL-MCNC: 145 MG/DL
POTASSIUM SERPL-SCNC: 4.4 MMOL/L (ref 3.4–5.3)
PROT SERPL-MCNC: 8 G/DL (ref 6.8–8.8)
SODIUM SERPL-SCNC: 141 MMOL/L (ref 133–144)
TRIGL SERPL-MCNC: 105 MG/DL

## 2017-11-13 ENCOUNTER — RADIANT APPOINTMENT (OUTPATIENT)
Dept: ULTRASOUND IMAGING | Facility: CLINIC | Age: 52
End: 2017-11-13
Attending: INTERNAL MEDICINE
Payer: COMMERCIAL

## 2017-11-13 ENCOUNTER — NURSE TRIAGE (OUTPATIENT)
Dept: NURSING | Facility: CLINIC | Age: 52
End: 2017-11-13

## 2017-11-13 DIAGNOSIS — N95.0 POST-MENOPAUSAL BLEEDING: ICD-10-CM

## 2017-11-13 PROCEDURE — 76830 TRANSVAGINAL US NON-OB: CPT | Performed by: OBSTETRICS & GYNECOLOGY

## 2017-11-13 PROCEDURE — 76856 US EXAM PELVIC COMPLETE: CPT | Performed by: OBSTETRICS & GYNECOLOGY

## 2017-11-14 ENCOUNTER — TELEPHONE (OUTPATIENT)
Dept: INTERNAL MEDICINE | Facility: CLINIC | Age: 52
End: 2017-11-14

## 2017-11-14 NOTE — TELEPHONE ENCOUNTER
Patient received result note attached to 11/13/17 pelvic US.  She wanted to make PCP aware that she has had a couple of episodes of vaginal bleeding since 11/2/17 office visit.  Once after using the toilet she saw what appeared to be a clot in the bottom of the toilet and another time she had blood on toilet tissue after urinating.    Patient also asking for PCP to recommend a gynecologist to her.  She would prefer a female but either gender would be acceptable.  She is also considering trying to get an appt with a Dr. Lillian Mata whom a friend recommended, but doesn't know if she can get in to see her.  GERMAINE Todd R.N.

## 2017-11-14 NOTE — TELEPHONE ENCOUNTER
Any of our gynecologists are very good. I don't usually specify any of them since their schedules vary a lot.

## 2017-11-15 ENCOUNTER — TRANSFERRED RECORDS (OUTPATIENT)
Dept: HEALTH INFORMATION MANAGEMENT | Facility: CLINIC | Age: 52
End: 2017-11-15

## 2017-12-01 ENCOUNTER — HOSPITAL ENCOUNTER (OUTPATIENT)
Facility: CLINIC | Age: 52
Discharge: HOME | End: 2017-12-01
Attending: INTERNAL MEDICINE | Admitting: INTERNAL MEDICINE
Payer: COMMERCIAL

## 2017-12-01 VITALS
DIASTOLIC BLOOD PRESSURE: 85 MMHG | SYSTOLIC BLOOD PRESSURE: 116 MMHG | RESPIRATION RATE: 14 BRPM | OXYGEN SATURATION: 94 %

## 2017-12-01 LAB — COLONOSCOPY: NORMAL

## 2017-12-01 PROCEDURE — G0500 MOD SEDAT ENDO SERVICE >5YRS: HCPCS | Performed by: INTERNAL MEDICINE

## 2017-12-01 PROCEDURE — G0105 COLORECTAL SCRN; HI RISK IND: HCPCS | Performed by: INTERNAL MEDICINE

## 2017-12-01 PROCEDURE — 25000128 H RX IP 250 OP 636: Performed by: INTERNAL MEDICINE

## 2017-12-01 PROCEDURE — 45378 DIAGNOSTIC COLONOSCOPY: CPT | Performed by: INTERNAL MEDICINE

## 2017-12-01 RX ORDER — ONDANSETRON 2 MG/ML
4 INJECTION INTRAMUSCULAR; INTRAVENOUS
Status: DISCONTINUED | OUTPATIENT
Start: 2017-12-01 | End: 2017-12-01 | Stop reason: HOSPADM

## 2017-12-01 RX ORDER — LIDOCAINE 40 MG/G
CREAM TOPICAL
Status: DISCONTINUED | OUTPATIENT
Start: 2017-12-01 | End: 2017-12-01 | Stop reason: HOSPADM

## 2017-12-01 RX ORDER — ONDANSETRON 2 MG/ML
4 INJECTION INTRAMUSCULAR; INTRAVENOUS EVERY 6 HOURS PRN
Status: CANCELLED | OUTPATIENT
Start: 2017-12-01

## 2017-12-01 RX ORDER — NALOXONE HYDROCHLORIDE 0.4 MG/ML
.1-.4 INJECTION, SOLUTION INTRAMUSCULAR; INTRAVENOUS; SUBCUTANEOUS
Status: CANCELLED | OUTPATIENT
Start: 2017-12-01 | End: 2017-12-02

## 2017-12-01 RX ORDER — FENTANYL CITRATE 50 UG/ML
INJECTION, SOLUTION INTRAMUSCULAR; INTRAVENOUS PRN
Status: DISCONTINUED | OUTPATIENT
Start: 2017-12-01 | End: 2017-12-01 | Stop reason: HOSPADM

## 2017-12-01 RX ORDER — ONDANSETRON 4 MG/1
4 TABLET, ORALLY DISINTEGRATING ORAL EVERY 6 HOURS PRN
Status: CANCELLED | OUTPATIENT
Start: 2017-12-01

## 2017-12-01 RX ORDER — FLUMAZENIL 0.1 MG/ML
0.2 INJECTION, SOLUTION INTRAVENOUS
Status: CANCELLED | OUTPATIENT
Start: 2017-12-01 | End: 2017-12-01

## 2017-12-01 NOTE — H&P
Pre-Endoscopy History and Physical     Joycelyn Laird MRN# [id number]   YOB: 1965 Age: 52 year old     Date of Procedure: 2017  Primary care provider: Gauri Early  Type of Endoscopy: Colonoscopy with possible biopsy, possible polypectomy  Reason for Procedure: screen  Type of Anesthesia Anticipated: Conscious Sedation    HPI:    Joycelyn is a 52 year old female who will be undergoing the above procedure.      A history and physical has been performed. The patient's medications and allergies have been reviewed. The risks and benefits of the procedure and the sedation options and risks were discussed with the patient.  All questions were answered and informed consent was obtained.      She denies a personal or family history of anesthesia complications or bleeding disorders.     Patient Active Problem List   Diagnosis     CARDIOVASCULAR SCREENING; LDL GOAL LESS THAN 160     Primary localized osteoarthrosis, ankle and foot     Fatty liver     Eczema        Past Medical History:   Diagnosis Date     Fatty liver      Mild or unspecified pre-eclampsia, unspecified as to episode of care     toxemia pregnancy-twins        Past Surgical History:   Procedure Laterality Date     C NONSPECIFIC PROCEDURE      wisdom tooth     C NONSPECIFIC PROCEDURE             Social History   Substance Use Topics     Smoking status: Never Smoker     Smokeless tobacco: Never Used     Alcohol use 0.0 oz/week     0 Standard drinks or equivalent per week      Comment: rare       Family History   Problem Relation Age of Onset     Lipids Mother      Hypertension Mother      Lipids Father      Hypertension Father      C.A.D. Father      68     Cardiovascular Father      PVD     Colon Cancer Brother      GASTROINTESTINAL DISEASE Brother      Ulcerative colitis     CANCER Maternal Grandfather      Skin cancer       Prior to Admission medications    Medication Sig Start Date End Date Taking? Authorizing Provider  "  Flaxseed, Linseed, (FLAXSEED OIL) 1200 MG CAPS Take by mouth every other day  10/6/15  Yes Flavio Paniagua MD   fish oil-omega-3 fatty acids (OMEGA 3) 1000 MG capsule Take 1 g by mouth every other day  9/16/13  Yes Gauri Early MD   MULTIVITAMIN TABS   OR    Yes    fluocinonide (LIDEX) 0.05 % cream Apply small amount to affected area bid as needed 11/2/17   Gauri Early MD   estradiol (ESTRACE VAGINAL) 0.1 MG/GM cream Place 2 g vaginally twice a week 3/30/17   Gauri Early MD   clobetasol (TEMOVATE) 0.05 % cream Apply topically as needed    Reported, Patient       No Known Allergies     REVIEW OF SYSTEMS:   5 point ROS negative except as noted above in HPI, including Gen., Resp., CV, GI &  system review.    PHYSICAL EXAM:   LMP 10/29/2011 Estimated body mass index is 35.06 kg/(m^2) as calculated from the following:    Height as of 11/2/17: 1.727 m (5' 8\").    Weight as of 11/2/17: 104.6 kg (230 lb 9.6 oz).   GENERAL APPEARANCE: alert, and oriented  MENTAL STATUS: alert  AIRWAY EXAM: Mallampatti Class I (visualization of the soft palate, fauces, uvula, anterior and posterior pillars)  RESP: lungs clear to auscultation - no rales, rhonchi or wheezes  CV: regular rates and rhythm  DIAGNOSTICS:    Not indicated    IMPRESSION   ASA Class 2 - Mild systemic disease    PLAN:   Plan for Colonoscopy with possible biopsy, possible polypectomy. We discussed the risks, benefits and alternatives and the patient wished to proceed.    The above has been forwarded to the consulting provider.      Signed Electronically by: Harley Rg  December 1, 2017          "

## 2017-12-01 NOTE — LETTER
November 8, 2017  Joycelyn Laird  [address] [facility] [id number]        Thank you for choosing [facility]. You are scheduled for the following service.     Date:  Friday, December 1             Procedure:  COLONOSCOPY  Doctor:        Dr. Harley Rg   Arrival Time:   1:30pm  *check in at Emergency/Endoscopy desk*  Procedure Time:  2pm    Location:   [facility]        Endoscopy Department, First Floor (Enter through ER Doors) *        [address] [place], [place] [address] [phone] or [phone] () to reschedule      MIRALAX -GATORADE  PREP  Colonoscopy is the most accurate test to detect colon polyps and colon cancer; and the only test where polyps can be removed. During this procedure, a doctor examines the lining of your large intestine and rectum through a flexible tube.           Transportation  Arrange for a ride for the day of your procedure with a responsible adult.  A taxi ride is not an option unless you are accompanied by a responsible adult. If you fail to arrange transportation with a responsible adult, your procedure will be cancelled and rescheduled.    Purchase the  following supplies at your local pharmacy:  - 2 (two) bisacodyl tablets: each tablet contains 5 mg.  (Dulcolax  laxative NOT Dulcolax  stool softener)   - 1 (one) 8.3 oz bottle of Polyethylene Glycol (PEG) 3350 Powder   (MiraLAX , Smooth LAX , ClearLAX  or equivalent)  - 64 oz Gatorade    Regular Gatorade, Gatorade G2 , Powerade , Powerade Zero  or Pedialyte  is acceptable. Red colored flavors are not allowed; all other colors (yellow, green, orange, purple and blue) are okay. It is also okay to buy two 2.12 oz packets of powdered Gatorade that can be mixed with water to a total volume of 64 oz of liquid.  - 1 (one) 10 oz bottle of Magnesium Citrate (Red colored flavors are not allowed)  It is also okay for you to use a 0.5 oz package of powdered  magnesium citrate (17 g) mixed with 10 oz of water.    PREPARATION FOR COLONOSCOPY    7 days before:    Discontinue fiber supplements and medications containing iron. This includes Metamucil  and Fibercon ; and multivitamins with iron.  3 days before:    Begin a low-fiber diet. A low-fiber diet helps making the cleanout more effective.     Examples of a low-fiber diet include (but are not limited to): white bread, white rice, pasta, crackers, fish, chicken, eggs, ground beef, creamy peanut butter, cooked/steamed/boiled vegetables, canned fruit, bananas, melons, milk, plain yogurt cheese, salad dressing and other condiments.     The following are not allowed on a low-fiber diet: seeds, nuts, popcorn, bran, whole wheat, corn, quinoa, raw fruits and vegetables, berries and dried fruit, beans and lentils.    For additional details on low-fiber diet, please refer to the table on the last page.  2 days before:    Continue the low-fiber diet.     Drink at least 8 glasses of water throughout the day.     Stop eating solid foods at 11:45 pm.  1 day before:    In the morning: begin a clear liquid diet (liquids you can see through).     Examples of a clear liquid diet include: water, clear broth or bouillon, Gatorade, Pedialyte or Powerade, carbonated and non-carbonated soft drinks (Sprite , 7-Up , ginger ale), strained fruit juices without pulp (apple, white grape, white cranberry), Jell-O  and popsicles.     The following are not allowed on a clear liquid diet: red liquids, alcoholic beverages, coffee, dairy products (milk, creamer, and yogurt), protein shakes, creamy broths, juice with pulp and chewing tobacco.    At noon: take 2 (two) bisacodyl tablets     At 4 (and no later than 6pm): start drinking the Miralax-Gatorade preparation (8.3 oz of Miralax mixed with 64 oz of Gatorade in a large pitcher). Drink 1(one) 8 oz glass every 15 minutes thereafter, until the mixture is gone.    COLON CLEANSING TIPS: drink adequate  amounts of fluids before and after your colon cleansing to prevent dehydration. Stay near a toilet because you will have diarrhea. Even if you are sitting on the toilet, continue to drink the cleansing solution every 15 minutes. If you feel nauseous or vomit, rinse your mouth with water, take a 15 to 30-minute-break and then continue drinking the solution. You will be uncomfortable until the stool has flushed from your colon (in about 2 to 4 hours). You may feel chilled.              Day of your procedure  You may take all of your morning medications including blood pressure medications, blood thinners (if you have not been instructed to stop these by our office), methadone, anti-seizure medications with sips of water 3 hours prior to your procedure or earlier. Do not take insulin or vitamins prior to your procedure. Continue the clear liquid diet.   4 hours prior: drink 10 oz of magnesium citrate. It may be easier to drink it with a straw.    STOP consuming all liquids after that.     Do not take anything by mouth during this time.     Allow extra time to travel to your procedure as you may need to stop and use a restroom along the way.  You are ready for the procedure, if you followed all instructions and your stool is no longer formed, but clear or yellow liquid. If you are unsure whether your colon is clean, please call our office at [phone] before you leave for your appointment.  Bring the following to your procedure:  - Insurance Card/Photo ID.   - List of current medications including over-the-counter medications and supplements.   - Your rescue inhaler if you currently use one to control asthma.      Canceling or rescheduling your appointment:   If you must cancel or reschedule your appointment, please call [phone] as soon as possible.      COLONOSCOPY PRE-PROCEDURE CHECKLIST  If you have diabetes, ask your regular doctor for diet and medication restrictions.  If you take an anticoagulant or  anti-platelet medication (such as Coumadin , Lovenox , Pradaxa , Xarelto , Eliquis , etc.), please call your primary doctor for advice on holding this medication.  If you take aspirin you may continue to do so.  If you are or may be pregnant, please discuss the risks and benefits of this procedure with your doctor.          What happens during a colonoscopy?    Plan to spend up to two hours, starting at registration time, at the endoscopy center the day of your procedure. The colonoscopy takes an average of 15 to 30 minutes. Recovery time is about 30 minutes.    Before the exam:    You will change into a gown.    Your medical history and medication list will be reviewed with you, unless that has been done over the phone prior to the procedure.     A nurse will insert an intravenous (IV) line into your hand or arm.    The doctor will meet with you and will give you a consent form to sign.    During the exam:     Medicine will be given through the IV line to help you relax.     Your heart rate and oxygen levels will be monitored. If your blood pressure is low, you may be given fluids through the IV line.     The doctor will insert a flexible hollow tube, called a colonoscope, into your rectum. The scope will be advanced slowly through the large intestine (colon).    You may have a feeling of fullness or pressure.     If an abnormal tissue or a polyp is found, the doctor may remove it through the endoscope for closer examination, or biopsy. Tissue removal is painless    After the exam:           Any tissue samples removed during the exam will be sent to a lab for evaluation. It may take 5-7 working days for you to be notified of the results.     A nurse will provide you with complete discharge instructions before you leave the endoscopy center. Be sure to ask the nurse for specific instructions if you take blood thinners such as Aspirin, Coumadin or Plavix.     The doctor will prepare a full report for you and for the  physician who referred you for the procedure.     Your doctor will talk with you about the initial results of your exam.      Medication given during the exam will prohibit you from driving for the rest of the day.     Following the exam, you may resume your normal diet. Your first meal should be light, no greasy foods. Avoid alcohol until the next day.     You may resume your regular activities the day after the procedure.     LOW-FIBER DIET    Foods RECOMMENDED Foods to AVOID   Breads, Cereal, Rice and Pasta:   White bread, rolls, biscuits, croissant and park toast.   Waffles, Estonian toast and pancakes.   White rice, noodles, pasta, macaroni and peeled cooked potatoes.   Plain crackers and saltines.   Cooked cereals: farina, cream of rice.   Cold cereals: Puffed Rice , Rice Krispies , Corn Flakes  and Special K    Breads, Cereal, Rice and Pasta:   Breads or rolls with nuts, seeds or fruit.   Whole wheat, pumpernickel, rye breads and cornbread.   Potatoes with skin, brown or wild rice, and kasha (buckwheat).     Vegetables:   Tender cooked and canned vegetables without seeds: carrots, asparagus tips, green or wax beans, pumpkin, spinach, lima beans. Vegetables:   Raw or steamed vegetables.   Vegetables with seeds.   Sauerkraut.   Winter squash, peas, broccoli, Brussel sprouts, cabbage, onions, cauliflower, baked beans, peas and corn.   Fruits:   Strained fruit juice.   Canned fruit, except pineapple.   Ripe bananas and melon. Fruits:   Prunes and prune juice.   Raw fruits.   Dried fruits: figs, dates and raisins.   Milk/Dairy:   Milk: plain or flavored.   Yogurt, custard and ice cream.   Cheese and cottage cheese Milk/Dairy:     Meat and other proteins:   ground, well-cooked tender beef, lamb, ham, veal, pork, fish, poultry and organ meats.   Eggs.   Peanut butter without nuts. Meat and other proteins:   Tough, fibrous meats with gristle.   Dry beans, peas and lentils.   Peanut butter with nuts.   Tofu.   Fats,  Snack, Sweets, Condiments and Beverages:   Margarine, butter, oils, mayonnaise, sour cream and salad dressing, plain gravy.   Sugar, hard candy, clear jelly, honey and syrup.   Spices, cooked herbs, bouillon, broth and soups made with allowed vegetable, ketchup and mustard.   Coffee, tea and carbonated drinks.   Plain cakes, cookies and pretzels.   Gelatin, plain puddings, custard, ice cream, sherbet and popsicles. Fats, Snack, Sweets, Condiments and Beverages:   Nuts, seeds and coconut.   Jam, marmalade and preserves.   Pickles, olives, relish and horseradish.   All desserts containing nuts, seeds, dried fruit and coconut; or made from whole grains or bran.   Candy made with nuts or seeds.   Popcorn.                     DIRECTIONS TO THE ENDOSCOPY DEPARTMENT     From the north ([facility])  Take [address], exit on [address] [address]. Get into the left hand homa, turn left (east), go one-half mile to [address] and turn left. Go north to the first stoplight, take a right on [address] and follow it to the Emergency entrance.    From the south ([facility])  Take [address]N to the [address] split and exit on [address] [address]. On [address] , turn left (west) to [address]. Turn right (north) on [address]. Go north to the first stoplight, take a right on [address] and follow it to the Emergency entrance.    From the east via 35E ([facility])  Take [address] south to [address] [address] exit. Turn right on [address] . Go west to [address]. Turn right (north) on [address]. Go to the first stoplight, take a right and follow on [address] to the Emergency entrance.    From the east via [address] ([facility])  Take [address] to [address]. Turn left (south) on [address] to [address]. Turn left (east) on [address] and follow it to the Emergency entrance.    From the west via [address] ([place], [place])  Take [address] east to  [address]. Turn right (south) on [address] to [facility]. Turn left (east) on [facility] and follow it to the Emergency entrance.

## 2017-12-27 ENCOUNTER — OFFICE VISIT (OUTPATIENT)
Dept: INTERNAL MEDICINE | Facility: CLINIC | Age: 52
End: 2017-12-27
Payer: COMMERCIAL

## 2017-12-27 VITALS
OXYGEN SATURATION: 100 % | TEMPERATURE: 98.2 F | HEIGHT: 68 IN | DIASTOLIC BLOOD PRESSURE: 72 MMHG | SYSTOLIC BLOOD PRESSURE: 118 MMHG | HEART RATE: 78 BPM | WEIGHT: 229 LBS | BODY MASS INDEX: 34.71 KG/M2

## 2017-12-27 DIAGNOSIS — Z01.818 PRE-OP EXAM: Primary | ICD-10-CM

## 2017-12-27 DIAGNOSIS — N95.0 POST-MENOPAUSE BLEEDING: ICD-10-CM

## 2017-12-27 LAB
ANION GAP SERPL CALCULATED.3IONS-SCNC: 7 MMOL/L (ref 3–14)
BASOPHILS # BLD AUTO: 0.1 10E9/L (ref 0–0.2)
BASOPHILS NFR BLD AUTO: 0.8 %
BUN SERPL-MCNC: 13 MG/DL (ref 7–30)
CALCIUM SERPL-MCNC: 9 MG/DL (ref 8.5–10.1)
CHLORIDE SERPL-SCNC: 105 MMOL/L (ref 94–109)
CO2 SERPL-SCNC: 27 MMOL/L (ref 20–32)
CREAT SERPL-MCNC: 0.73 MG/DL (ref 0.52–1.04)
DIFFERENTIAL METHOD BLD: NORMAL
EOSINOPHIL # BLD AUTO: 0.2 10E9/L (ref 0–0.7)
EOSINOPHIL NFR BLD AUTO: 3.3 %
ERYTHROCYTE [DISTWIDTH] IN BLOOD BY AUTOMATED COUNT: 12.1 % (ref 10–15)
GFR SERPL CREATININE-BSD FRML MDRD: 85 ML/MIN/1.7M2
GLUCOSE SERPL-MCNC: 111 MG/DL (ref 70–99)
HCT VFR BLD AUTO: 42.9 % (ref 35–47)
HGB BLD-MCNC: 14.3 G/DL (ref 11.7–15.7)
LYMPHOCYTES # BLD AUTO: 1.8 10E9/L (ref 0.8–5.3)
LYMPHOCYTES NFR BLD AUTO: 29.6 %
MCH RBC QN AUTO: 29.1 PG (ref 26.5–33)
MCHC RBC AUTO-ENTMCNC: 33.3 G/DL (ref 31.5–36.5)
MCV RBC AUTO: 87 FL (ref 78–100)
MONOCYTES # BLD AUTO: 0.7 10E9/L (ref 0–1.3)
MONOCYTES NFR BLD AUTO: 12.1 %
NEUTROPHILS # BLD AUTO: 3.3 10E9/L (ref 1.6–8.3)
NEUTROPHILS NFR BLD AUTO: 54.2 %
PLATELET # BLD AUTO: 241 10E9/L (ref 150–450)
POTASSIUM SERPL-SCNC: 4 MMOL/L (ref 3.4–5.3)
RBC # BLD AUTO: 4.92 10E12/L (ref 3.8–5.2)
SODIUM SERPL-SCNC: 139 MMOL/L (ref 133–144)
WBC # BLD AUTO: 6.1 10E9/L (ref 4–11)

## 2017-12-27 PROCEDURE — 80048 BASIC METABOLIC PNL TOTAL CA: CPT | Performed by: NURSE PRACTITIONER

## 2017-12-27 PROCEDURE — 36415 COLL VENOUS BLD VENIPUNCTURE: CPT | Performed by: NURSE PRACTITIONER

## 2017-12-27 PROCEDURE — 93000 ELECTROCARDIOGRAM COMPLETE: CPT | Performed by: NURSE PRACTITIONER

## 2017-12-27 PROCEDURE — 85025 COMPLETE CBC W/AUTO DIFF WBC: CPT | Performed by: NURSE PRACTITIONER

## 2017-12-27 PROCEDURE — 99215 OFFICE O/P EST HI 40 MIN: CPT | Performed by: NURSE PRACTITIONER

## 2017-12-27 NOTE — PATIENT INSTRUCTIONS
Lab in [address]        Before Your Surgery      Call your surgeon if there is any change in your health. This includes signs of a cold or flu (such as a sore throat, runny nose, cough, rash or fever).    Do not smoke, drink alcohol or take over the counter medicine (unless your surgeon or primary care doctor tells you to) for the 24 hours before and after surgery.    If you take prescribed drugs: Follow your doctor s orders about which medicines to take and which to stop until after surgery.    Eating and drinking prior to surgery: follow the instructions from your surgeon    Take a shower or bath the night before surgery. Use the soap your surgeon gave you to gently clean your skin. If you do not have soap from your surgeon, use your regular soap. Do not shave or scrub the surgery site.  Wear clean pajamas and have clean sheets on your bed.

## 2017-12-27 NOTE — NURSING NOTE
"Chief Complaint   Patient presents with     Pre-Op Exam       Initial /72 (BP Location: Left arm, Patient Position: Sitting, Cuff Size: Adult Large)  Pulse 78  Temp 98.2  F (36.8  C) (Oral)  Ht 5' 8\" (1.727 m)  Wt 229 lb (103.9 kg)  LMP 10/29/2011  SpO2 100%  Breastfeeding? No  BMI 34.82 kg/m2 Estimated body mass index is 34.82 kg/(m^2) as calculated from the following:    Height as of this encounter: 5' 8\" (1.727 m).    Weight as of this encounter: 229 lb (103.9 kg).  Medication Reconciliation: complete   Verona RANGEL      "

## 2017-12-27 NOTE — PROGRESS NOTES
[facility]  [address]  [facility] [address]  [phone]  Dept: [phone]    PRE-OP EVALUATION:  Today's date: 2017    Joycelyn Laird (: 1965) presents for pre-operative evaluation assessment as requested by Dr. Fernandez.  She requires evaluation and anesthesia risk assessment prior to undergoing surgery/procedure for treatment of FIBROID/CYST .  Proposed procedure: Vaginoscopy    Date of Surgery/ Procedure: 2018  Time of Surgery/ Procedure: 0945   Hospital/Surgical Facility: [facility]    Primary Physician: Gauri Early  Type of Anesthesia Anticipated: to be determined    Patient has a Health Care Directive or Living Will:  NO    Preop Questions 2017   1.  Do you have a history of heart attack, stroke, stent, bypass or surgery on an artery in the head, neck, heart or legs? No   2.  Do you ever have any pain or discomfort in your chest? No   3.  Do you have a history of  Heart Failure? No   4.   Are you troubled by shortness of breath when:  walking on a level surface, or up a slight hill, or at night? No   5.  Do you currently have a cold, bronchitis or other respiratory infection? YES - cold and drainage in back of throat - no fever   Started in past 2 days    6.  Do you have a cough, shortness of breath, or wheezing? YES - as above - cough with drainage    7.  Do you sometimes get pains in the calves of your legs when you walk? No   8. Do you or anyone in your family have previous history of blood clots? No   9.  Do you or does anyone in your family have a serious bleeding problem such as prolonged bleeding following surgeries or cuts? No   10. Have you ever had problems with anemia or been told to take iron pills? No   11. Have you had any abnormal blood loss such as black, tarry or bloody stools, or abnormal vaginal bleeding? YES - excess vaginal bleeding    12. Have you ever had a blood transfusion? No   13. Have you or any of your  relatives ever had problems with anesthesia? No   14. Do you have sleep apnea, excessive snoring or daytime drowsiness? No   15. Do you have any prosthetic heart valves? No   16. Do you have prosthetic joints? No   17. Is there any chance that you may be pregnant? No           HPI:                                                      Brief HPI related to upcoming procedure: need to have assessment and removal of cyst or fibroid in uterus causing spotting post menopausal      See problem list for active medical problems.  Problems all longstanding and stable, except as noted/documented.  See ROS for pertinent symptoms related to these conditions.                                                                                                  .    MEDICAL HISTORY:                                                    Patient Active Problem List    Diagnosis Date Noted     Eczema 2016     Priority: Medium     Fatty liver 2013     Priority: Medium     Primary localized osteoarthrosis, ankle and foot 10/18/2011     Priority: Medium     CARDIOVASCULAR SCREENING; LDL GOAL LESS THAN 160 10/31/2010     Priority: Medium      Past Medical History:   Diagnosis Date     Fatty liver      Mild or unspecified pre-eclampsia, unspecified as to episode of care     toxemia pregnancy-twins     Past Surgical History:   Procedure Laterality Date     C NONSPECIFIC PROCEDURE      wisdom tooth     C NONSPECIFIC PROCEDURE           COLONOSCOPY N/A 2017    Procedure: COLONOSCOPY;  Colonoscopy;  Surgeon: Harley Rg MD;  Location:  GI     GYN SURGERY      procedure to check for cancer in office - endometrial biopsy benign      Current Outpatient Prescriptions   Medication Sig Dispense Refill     fluocinonide (LIDEX) 0.05 % cream Apply small amount to affected area bid as needed 30 g 2     clobetasol (TEMOVATE) 0.05 % cream Apply topically as needed       estradiol (ESTRACE VAGINAL) 0.1 MG/GM cream  "Place 2 g vaginally twice a week (Patient not taking: Reported on 12/27/2017) 70 g 12     Flaxseed, Linseed, (FLAXSEED OIL) 1200 MG CAPS Take by mouth every other day        fish oil-omega-3 fatty acids (OMEGA 3) 1000 MG capsule Take 1 g by mouth every other day  90 capsule 3     MULTIVITAMIN TABS   OR  (Patient not taking: Reported on 12/27/2017)       OTC products: None, except as noted above    No Known Allergies   Latex Allergy: NO    Social History   Substance Use Topics     Smoking status: Never Smoker     Smokeless tobacco: Never Used     Alcohol use 0.0 oz/week     0 Standard drinks or equivalent per week      Comment: rare     History   Drug Use No       REVIEW OF SYSTEMS:                                                    C: NEGATIVE for fever, chills, change in weight  I: NEGATIVE for worrisome rashes, moles or lesions  E: NEGATIVE for vision changes or irritation  E/M: NEGATIVE for ear, mouth and throat problems  R: NEGATIVE for significant cough or SOB- occasional cough with cold that seems to be resolving - she is aware that she is to cancel surgery if cold worsens   B: NEGATIVE for masses, tenderness or discharge  CV: NEGATIVE for chest pain, palpitations or peripheral edema  GI: NEGATIVE for nausea, abdominal pain, heartburn, or change in bowel habits  : NEGATIVE for frequency, dysuria, or hematuria  Known lesion in uterus that needs removal and causing spotting   M: NEGATIVE for significant arthralgias or myalgia  N: NEGATIVE for weakness, dizziness or paresthesias  E: NEGATIVE for temperature intolerance, skin/hair changes  H: NEGATIVE for bleeding problems  P: NEGATIVE for changes in mood or affect    EXAM:                                                    /72 (BP Location: Left arm, Patient Position: Sitting, Cuff Size: Adult Large)  Pulse 78  Temp 98.2  F (36.8  C) (Oral)  Ht 5' 8\" (1.727 m)  Wt 229 lb (103.9 kg)  LMP 10/29/2011  SpO2 100%  Breastfeeding? No  BMI 34.82 kg/m2    " GENERAL APPEARANCE: alert and no distress     HENT: ear canals and TM's normal and nose and mouth without ulcers or lesions     RESP: lungs clear to auscultation - no rales, rhonchi or wheezes     CV: regular rates and rhythm, normal S1 S2, no S3 or S4 and no murmur, click or rub     ABDOMEN:  soft, nontender, no HSM or masses and bowel sounds normal     MS: extremities normal- no gross deformities noted, no evidence of inflammation in joints, FROM in all extremities.     SKIN: no suspicious lesions or rashes     NEURO: Normal strength and tone, sensory exam grossly normal, mentation intact and speech normal     PSYCH: mentation appears normal. and affect normal/bright    DIAGNOSTICS:                                                    EKG: appears normal, NSR, there are no prior tracings available    Component      Latest Ref Rng & Units 12/27/2017   Sodium      133 - 144 mmol/L 139   Potassium      3.4 - 5.3 mmol/L 4.0   Chloride      94 - 109 mmol/L 105   Carbon Dioxide      20 - 32 mmol/L 27   Anion Gap      3 - 14 mmol/L 7   Glucose      70 - 99 mg/dL 111 (H)   Urea Nitrogen      7 - 30 mg/dL 13   Creatinine      0.52 - 1.04 mg/dL 0.73   GFR Estimate      >60 mL/min/1.7m2 85   GFR Estimate If Black      >60 mL/min/1.7m2 >90   Calcium      8.5 - 10.1 mg/dL 9.0       Recent Labs   Lab Test  11/02/17   0933  07/18/16   0834   09/29/14   0808   NA  141  137   < >   --    POTASSIUM  4.4  4.3   < >   --    CR  0.73  0.70   < >   --    A1C   --   5.5   --   5.4    < > = values in this interval not displayed.        IMPRESSION:                                                    Reason for surgery/procedure: uterine lesion needs removal   Diagnosis/reason for consult: preop     The proposed surgical procedure is considered INTERMEDIATE risk.    REVISED CARDIAC RISK INDEX  The patient has the following serious cardiovascular risks for perioperative complications such as (MI, PE, VFib and 3  AV Block):  No serious cardiac  risks  INTERPRETATION: 1 risks: Class II (low risk - 0.9% complication rate)    The patient has the following additional risks for perioperative complications:  No identified additional risks      ICD-10-CM    1. Pre-op exam Z01.818 CBC with platelets differential     Basic metabolic panel     EKG 12-lead complete w/read - Clinics   2. Post-menopause bleeding N95.0 CBC with platelets differential    possible fibroids        RECOMMENDATIONS:                                                              APPROVAL GIVEN to proceed with proposed procedure, without further diagnostic evaluation  As long as labs are reasonable ranges       Signed Electronically by: STEPHANIE Singh CNP    Copy of this evaluation report is provided to requesting physician.    [place] Preop Guidelines

## 2017-12-27 NOTE — MR AVS SNAPSHOT
After Visit Summary   12/27/2017    Joycelyn Laird    MRN: [id number]           Patient Information     Date Of Birth          1965        Visit Information        Provider Department      12/27/2017 9:20 AM Cande Bradshaw APRN CNP [facility]        Today's Diagnoses     Pre-op exam    -  1    Post-menopause bleeding          Care Instructions    Lab in [address]        Before Your Surgery      Call your surgeon if there is any change in your health. This includes signs of a cold or flu (such as a sore throat, runny nose, cough, rash or fever).    Do not smoke, drink alcohol or take over the counter medicine (unless your surgeon or primary care doctor tells you to) for the 24 hours before and after surgery.    If you take prescribed drugs: Follow your doctor s orders about which medicines to take and which to stop until after surgery.    Eating and drinking prior to surgery: follow the instructions from your surgeon    Take a shower or bath the night before surgery. Use the soap your surgeon gave you to gently clean your skin. If you do not have soap from your surgeon, use your regular soap. Do not shave or scrub the surgery site.  Wear clean pajamas and have clean sheets on your bed.           Follow-ups after your visit        Who to contact     If you have questions or need follow up information about today's clinic visit or your schedule please contact [facility] directly at [phone].  Normal or non-critical lab and imaging results will be communicated to you by MyChart, letter or phone within 4 business days after the clinic has received the results. If you do not hear from us within 7 days, please contact the clinic through MyChart or phone. If you have a critical or abnormal lab result, we will notify you by phone as soon as possible.  Submit refill requests through [email or web address] or call your pharmacy and they will forward the refill request to us.  "Please allow 3 business days for your refill to be completed.          Additional Information About Your Visit        MyChart Information     [employer] gives you secure access to your electronic health record. If you see a primary care provider, you can also send messages to your care team and make appointments. If you have questions, please call your primary care clinic.  If you do not have a primary care provider, please call [phone] and they will assist you.        Care EveryWhere ID     This is your Care EveryWhere ID. This could be used by other organizations to access your [place] medical records  [id number]        Your Vitals Were     Pulse Temperature Height Last Period Pulse Oximetry Breastfeeding?    78 98.2  F (36.8  C) (Oral) 5' 8\" (1.727 m) 10/29/2011 100% No    BMI (Body Mass Index)                   34.82 kg/m2            Blood Pressure from Last 3 Encounters:   12/27/17 118/72   12/01/17 116/85   11/02/17 128/89    Weight from Last 3 Encounters:   12/27/17 229 lb (103.9 kg)   11/02/17 230 lb 9.6 oz (104.6 kg)   05/12/17 214 lb (97.1 kg)              We Performed the Following     Basic metabolic panel     CBC with platelets differential     EKG 12-lead complete w/read - Clinics        Primary Care Provider Office Phone # Fax #    Gauri Early -[phone]       [address]  [facility] [address]        Equal Access to Services     [facility]: Hadii aad ku hadasho Soomaali, waaxda luqadaha, qaybta kaalmada adeegyada, waxay joel garcia . So [place] -[phone].    ATENCIÓN: Si habla español, tiene a alonzo disposición servicios gratuitos de asistencia lingüística. Alaina al [phone].    We comply with applicable federal civil rights laws and [place] laws. We do not discriminate on the basis of race, color, national origin, age, disability, sex, sexual orientation, or gender identity.            Thank you!     Thank you for choosing [facility]  for your care. Our goal is always to provide you with excellent care. Hearing back from our patients is one way we can continue to improve our services. Please take a few minutes to complete the written survey that you may receive in the mail after your visit with us. Thank you!             Your Updated Medication List - Protect others around you: Learn how to safely use, store and throw away your medicines at www.disposemymeds.org.          This list is accurate as of: 12/27/17  9:54 AM.  Always use your most recent med list.                   Brand Name Dispense Instructions for use Diagnosis    clobetasol 0.05 % cream    TEMOVATE     Apply topically as needed        estradiol 0.1 MG/GM cream    ESTRACE VAGINAL    70 g    Place 2 g vaginally twice a week    Vaginal atrophy       fish oil-omega-3 fatty acids 1000 MG capsule     90 capsule    Take 1 g by mouth every other day        Flaxseed Oil 1200 MG Caps      Take by mouth every other day        fluocinonide 0.05 % cream    LIDEX    30 g    Apply small amount to affected area bid as needed    Eczema, unspecified type       MULTIVITAMIN TABS   OR

## 2018-01-02 ENCOUNTER — OFFICE VISIT (OUTPATIENT)
Dept: INTERNAL MEDICINE | Facility: CLINIC | Age: 53
End: 2018-01-02
Payer: COMMERCIAL

## 2018-01-02 VITALS
WEIGHT: 232.5 LBS | TEMPERATURE: 98.3 F | HEART RATE: 71 BPM | OXYGEN SATURATION: 99 % | SYSTOLIC BLOOD PRESSURE: 122 MMHG | BODY MASS INDEX: 35.35 KG/M2 | DIASTOLIC BLOOD PRESSURE: 80 MMHG

## 2018-01-02 DIAGNOSIS — J06.9 UPPER RESPIRATORY TRACT INFECTION, UNSPECIFIED TYPE: Primary | ICD-10-CM

## 2018-01-02 PROCEDURE — 99213 OFFICE O/P EST LOW 20 MIN: CPT | Performed by: INTERNAL MEDICINE

## 2018-01-02 NOTE — PATIENT INSTRUCTIONS
Oral decongestant (pseudoephedrine) if needed for congestion and post-nasal drip.     ---    I will send note to surgeon.

## 2018-01-02 NOTE — PROGRESS NOTES
SUBJECTIVE:                                                      HPI: Joycelyn Laird is a pleasant 52 year old female who presents with upper respiratory symptoms:    - ongoing for the last week  - symptoms include...   - cough - was occasionally productive, now dry   - postnasal drip   - sinus congestion - started last night, has improved over the course of this morning  - not using any OTC products for symptomatic relief (symptoms are not that severe)    - no sinus pressure or pain  - no fevers or chills  - no shortness of breath or wheezing  - no excessive fatigue   - no myalgias, arthralgias, or headaches    Of note, patient is scheduled to have a hysteroscopy with Dr. Moore tomorrow at [facility] (Fax#[phone]).     The medication, allergy, and problem lists have been reviewed and updated as appropriate.       OBJECTIVE:                                                      /80 (BP Location: Left arm, Patient Position: Chair, Cuff Size: Adult Large)  Pulse 71  Temp 98.3  F (36.8  C) (Oral)  Wt 232 lb 8 oz (105.5 kg)  LMP 10/29/2011  SpO2 99%  BMI 35.35 kg/m2  Constitutional: well-appearing  Head, Ears, Nose, and Throat: normocephalic, atraumatic; normal external auditory canal and pinna; tympanic membranes visualized and normal; nasal septum straight; nasal mucosa pink; no oropharyngeal lesions or ulcers; dentition normal; no tonsillar exudates  Neck: supple, symmetric, no thyromegaly or lymphadenopathy  Respiratory: normal respiratory effort; clear to auscultation bilaterally      ASSESSMENT/PLAN:                                                      (J06.9) Upper respiratory tract infection, unspecified type  (primary encounter diagnosis)  Comment: suspect mild viral URI.  Plan:    - may use oral decongestant as needed for congestion and/or postnasal drip.   - may proceed with surgery tomorrow.    The instructions on the AVS were discussed and explained to the patient. Patient  expressed understanding of instructions.    (Chart documentation was completed, in part, with [employer] voice-recognition software. Even though reviewed, some grammatical, spelling, and word errors may remain.)    Kaitlin Flynn MD   [address] [address]  T: [phone], F: [phone]

## 2018-01-02 NOTE — MR AVS SNAPSHOT
After Visit Summary   1/2/2018    Joycelyn Laird    MRN: [id number]           Patient Information     Date Of Birth          1965        Visit Information        Provider Department      1/2/2018 11:45 AM Kaitlin Flynn MD [facility]        Care Instructions    Oral decongestant (pseudoephedrine) if needed for congestion and post-nasal drip.     ---    I will send note to surgeon.              Follow-ups after your visit        Who to contact     If you have questions or need follow up information about today's clinic visit or your schedule please contact [facility] directly at [phone].  Normal or non-critical lab and imaging results will be communicated to you by MyChart, letter or phone within 4 business days after the clinic has received the results. If you do not hear from us within 7 days, please contact the clinic through [email or web address] or phone. If you have a critical or abnormal lab result, we will notify you by phone as soon as possible.  Submit refill requests through [employer] or call your pharmacy and they will forward the refill request to us. Please allow 3 business days for your refill to be completed.          Additional Information About Your Visit        MyChart Information     [employer] gives you secure access to your electronic health record. If you see a primary care provider, you can also send messages to your care team and make appointments. If you have questions, please call your primary care clinic.  If you do not have a primary care provider, please call [phone] and they will assist you.        Care EveryWhere ID     This is your Care EveryWhere ID. This could be used by other organizations to access your [place] medical records  [id number]        Your Vitals Were     Pulse Temperature Last Period Pulse Oximetry BMI (Body Mass Index)       71 98.3  F (36.8  C) (Oral) 10/29/2011 99% 35.35 kg/m2        Blood Pressure  from Last 3 Encounters:   01/02/18 122/80   12/27/17 118/72   12/01/17 116/85    Weight from Last 3 Encounters:   01/02/18 232 lb 8 oz (105.5 kg)   12/27/17 229 lb (103.9 kg)   11/02/17 230 lb 9.6 oz (104.6 kg)              Today, you had the following     No orders found for display       Primary Care Provider Office Phone # Fax #    Gauri Early -[phone]       [address] [facility] [address]  [facility] [address]        Equal Access to Services     [facility]: Hadii aad ku hadasho Soomaali, waaxda luqadaha, qaybta kaalmada kalin, radha garcia . So [facility] [phone].    ATENCIÓN: Si habla español, tiene a alonzo disposición servicios gratuitos de asistencia lingüística. [facility] [phone].    We comply with applicable federal civil rights laws and [place] laws. We do not discriminate on the basis of race, color, national origin, age, disability, sex, sexual orientation, or gender identity.            Thank you!     Thank you for choosing [facility]  for your care. Our goal is always to provide you with excellent care. Hearing back from our patients is one way we can continue to improve our services. Please take a few minutes to complete the written survey that you may receive in the mail after your visit with us. Thank you!             Your Updated Medication List - Protect others around you: Learn how to safely use, store and throw away your medicines at www.disposemymeds.org.          This list is accurate as of: 1/2/18 12:01 PM.  Always use your most recent med list.                   Brand Name Dispense Instructions for use Diagnosis    clobetasol 0.05 % cream    TEMOVATE     Apply topically as needed        estradiol 0.1 MG/GM cream    ESTRACE VAGINAL    70 g    Place 2 g vaginally twice a week    Vaginal atrophy       fish oil-omega-3 fatty acids 1000 MG capsule     90 capsule    Take 1 g by mouth every other day        Flaxseed Oil 1200  MG Caps      Take by mouth every other day        fluocinonide 0.05 % cream    LIDEX    30 g    Apply small amount to affected area bid as needed    Eczema, unspecified type       MULTIVITAMIN TABS   OR

## 2018-01-29 ENCOUNTER — TRANSFERRED RECORDS (OUTPATIENT)
Dept: HEALTH INFORMATION MANAGEMENT | Facility: CLINIC | Age: 53
End: 2018-01-29

## 2018-04-05 ENCOUNTER — TELEPHONE (OUTPATIENT)
Dept: INTERNAL MEDICINE | Facility: CLINIC | Age: 53
End: 2018-04-05

## 2018-04-05 NOTE — TELEPHONE ENCOUNTER
How many servings of dairy is she getting per day and of what kinds of dairy? Is she taking a multivitamin, if so how much calcium does it have and how much vitamin D?

## 2018-04-05 NOTE — TELEPHONE ENCOUNTER
Pt no longer drinking as much milk d/t trying to cut down on caloric intake.    Asking for recommendations for calcium supplement--dosing/directions.    Please advise, thanks.

## 2018-04-06 NOTE — TELEPHONE ENCOUNTER
Call to patient. States she probably gets 1-2 servings of dairy per day. States she has maybe a cup of milk a day and some cheese. Also takes a multivit with 500 of calcium daily. It has 1000 IU Vit D in it.

## 2018-04-12 ENCOUNTER — TRANSFERRED RECORDS (OUTPATIENT)
Dept: HEALTH INFORMATION MANAGEMENT | Facility: CLINIC | Age: 53
End: 2018-04-12

## 2018-04-12 LAB
ALT SERPL-CCNC: 50 U/L (ref 0–45)
AST SERPL-CCNC: 46 U/L (ref 0–33)
CHOLEST SERPL-MCNC: 191 MG/DL (ref 140–199)
CREAT SERPL-MCNC: 0.66 MG/DL (ref 0.6–1.3)
GLUCOSE SERPL-MCNC: 90 MG/DL (ref 60–109)
HBA1C MFR BLD: 5.6 % (ref 3–6)
HDLC SERPL-MCNC: 43.8 MG/DL (ref 35–100)
LDLC SERPL CALC-MCNC: 131 MG/DL (ref 0–129)
TRIGL SERPL-MCNC: 78 MG/DL (ref 0–200)

## 2018-05-30 ENCOUNTER — OFFICE VISIT (OUTPATIENT)
Dept: FAMILY MEDICINE | Facility: CLINIC | Age: 53
End: 2018-05-30
Payer: COMMERCIAL

## 2018-05-30 VITALS
WEIGHT: 212 LBS | BODY MASS INDEX: 32.13 KG/M2 | OXYGEN SATURATION: 98 % | HEIGHT: 68 IN | SYSTOLIC BLOOD PRESSURE: 130 MMHG | HEART RATE: 68 BPM | TEMPERATURE: 98.2 F | DIASTOLIC BLOOD PRESSURE: 82 MMHG

## 2018-05-30 DIAGNOSIS — K76.0 FATTY LIVER: ICD-10-CM

## 2018-05-30 DIAGNOSIS — E66.811 CLASS 1 OBESITY WITHOUT SERIOUS COMORBIDITY WITH BODY MASS INDEX (BMI) OF 32.0 TO 32.9 IN ADULT, UNSPECIFIED OBESITY TYPE: ICD-10-CM

## 2018-05-30 DIAGNOSIS — R79.89 ABNORMAL LIVER FUNCTION TESTS: Primary | ICD-10-CM

## 2018-05-30 DIAGNOSIS — Z11.4 ENCOUNTER FOR SCREENING FOR HIV: ICD-10-CM

## 2018-05-30 DIAGNOSIS — Z13.6 CARDIOVASCULAR SCREENING; LDL GOAL LESS THAN 130: ICD-10-CM

## 2018-05-30 PROCEDURE — 80076 HEPATIC FUNCTION PANEL: CPT | Performed by: FAMILY MEDICINE

## 2018-05-30 PROCEDURE — 99214 OFFICE O/P EST MOD 30 MIN: CPT | Performed by: FAMILY MEDICINE

## 2018-05-30 PROCEDURE — 87389 HIV-1 AG W/HIV-1&-2 AB AG IA: CPT | Performed by: FAMILY MEDICINE

## 2018-05-30 PROCEDURE — 36415 COLL VENOUS BLD VENIPUNCTURE: CPT | Performed by: FAMILY MEDICINE

## 2018-05-30 NOTE — PATIENT INSTRUCTIONS
Inquire with your insurance company about Shingrix (shingles vaccine), series of 2 injections    Follow up for mammogram this summer     CALCIUM    Recommendations:  Teenagers and premenopausal women: 1200 mg/day  Pregnant and Lactating women: 1500 mg/day  Men and Postmenopausal women on estrogen: 1200mg/day  Postmenopausal women not on estrogen: 1500 mg/day    If you are not eating dairy products you also need 400 IU of vitamin D per day which can be obtained in either a multivitamin or in some of the Calcium tablets.    Dietary sources: These also contain vitamin D  Milk                            8 oz            300 mg  Yogurt                          1 cup           400 mg  Hard cheese                     1.5 oz          300 mg  Cottage cheese                  2 cup           300 mg  Orange juice with Calcium       8 oz            300 mg  Low fat dairy sources are recommended    Supplements:  Tums EX                         300 mg  Tums Ultra                      400 mg  Caltrate 600                    600 mg  Oscal                           500 mg  Oscal/D                         500 mg plus vitamin D  Women's Formula Multivitamin    450 mg                Losing Weight      Weight Loss Diets  Why do I need to lose weight if I am overweight?   Being overweight increases your risk for high blood pressure, heart disease, stroke, diabetes, and cancer. If you are overweight, losing just 5 to 10% of your weight and keeping it off lowers your risk for most of these diseases. Your healthcare provider can give you a good idea of how your weight increases your risk.  How can I know if I am overweight?  The number you see on the scale doesn't necessarily tell you whether you need to lose weight. That's because 2 people of the same height and weight can have different bone structures. They may carry different amounts of muscle and body fat. For most adults, determining your body mass index (BMI) and waist size are reliable  "ways to tell whether you are overweight and to estimate your risk for health problems.  The BMI uses your height and weight to estimate how much fat is on your body. A BMI of at least 25 indicates overweight. A BMI of 30 or more indicates you are obese. Generally, the higher your BMI, the higher your health risk.  Your waist size indicates whether you have an apple body shape and tend to carry fat around your midsection. Your health risks increase even further with increasing waist size. A waist measurement greater than 40 inches for men or 35 inches for women indicates a significant increase in health risk, particularly for heart disease and diabetes.  To tell whether your weight is a health risk, you can determine your BMI and health risk with the Body Mass Index chart. Measure your waist at the point below your ribcage but above your navel. Use your BMI and waist size to determine your risk from the \"Calculating Your Risk\" table included with the BMI chart.  There are some limits to the usefulness of the BMI score. It may overestimate body fat in athletes and others who have a muscular build. It may underestimate body fat in older persons and others who have lost muscle mass. Your healthcare provider can give you a good sense of whether you have an increased risk of health problems because of your weight. Your provider can also help you find a weight-loss program that works for you.  The BMI chart is not for use in pregnancy. If you are pregnant and want a guide to normal pregnancy weight gain, ask your healthcare provider for a chart.  What can I do to lose weight?  If you want to lose weight, you can begin with a safe, healthy, well-balanced weight-loss diet. However, the most effective weight management program is not limited to diet. Rather, it involves changes in your lifestyle, including your eating and physical activity habits, which you will be able to continue for the rest of your life.  A plan for " weight reduction should include good nutrition, fewer calories, and physical activity. The best sources for information about a safe, healthy, effective weight reduction program are dietitians and healthcare providers.  A good weight loss plan includes:  a healthy diet   physical activity   understanding the emotions behind your eating patterns.  To start your program for losing weight:  Determine your weight goal.   Learn how many calories you need each day for a healthy weight.   Discuss with a dietitian or healthcare provider how to choose healthy and satisfying foods to get those calories.   Find ways to increase your physical activity. Plan a schedule for getting regular exercise and stick to it.   Learn how you use food for reasons besides nutrition. For example, do you eat when you are bored or stressed? Do you reward yourself with food? Make changes to prevent these behaviors. For example, allow yourself to eat only at certain places, such as the cafeteria or break room at work and the kitchen or dining table at home. Do not eat meals or snacks in the car or in front of the TV.  Changes that will help you lose weight include:  a better understanding of your own health   healthier eating habits   more physical activity   rewarding yourself for losing weight  Healthy diets for losing weight involve:  making smart choices from every food group: fruits, vegetables, grains, milk products, meat, and fats   finding a balance between how much food you eat and how much exercise you get   getting the most nutrition from your calories.  Losing weight most often means eating fewer calories and avoiding some foods. However, a weight loss diet needs to give enough nutrition and a good variety of satisfying foods as well as fewer calories.  What works best is a gradual change in your habits of eating and physical activity--a change that you can continue the rest of your life. The ideal diet helps you lose weight slowly but  steadily, so you can keep a healthy weight after you have reached your goal. The best weight loss plan is one that fits your needs and food preferences.   Ask your healthcare provider for a safe, healthy, and effective weight loss program.  What foods should I choose to lose weight?   A healthy eating plan is one that:  Includes a lot of fruits, vegetables, whole grains, and beans.   Includes fat-free or low-fat milk products.   Includes lean meats, poultry, fish, eggs or egg whites, nuts, seeds, and soy foods.   Is low in saturated fats, trans fats, cholesterol, salt, and added sugars.  Keep track of everything you eat in a food diary. As soon as you eat or drink, write it down. It may be helpful to use a small pocket diary. Seeing what you eat and drink will help you learn more about your eating patterns and food habits.  What foods should I limit or avoid?  Try to avoid the following types of food:  refined carbohydrates (sugar) and foods containing added sugars, such as sucrose, glucose, dextrose, high-fructose corn syrup, corn sweetener, honey, and brown sugar   refined grain products such as white rice and white flour (try to substitute whole grains for refined grains whenever you can).  Also avoid:  saturated fats such as butter, cream cheese, poultry skin, whole-milk dairy products (including cheese), and fat on meats   other foods that often contain a lot of fat and trans fats, such as pastries, cakes, cookies, potato chips, and crackers   fried foods   packaged meats because they are often high in fat, salt, and preservatives (look for low-fat, low-salt varieties).  Alcoholic drinks add calories to your diet with very little nutrition. If you choose to drink alcohol, do so in moderation. Moderate drinking means up to 1 drink a day for women and up to 2 drinks for men. A drink equals 12 ounces of regular beer, 5 ounces of wine, or 1 and 1/2 ounces of 80-proof distilled spirits.  What are calories?   A  "calorie is a way to measure the energy value of food. Your body burns calories for energy. Proteins, carbohydrates, and fats contain calories. To lose weight, eat fewer calories without giving up nutrition. Burn more calories with more physical activity. Your body will burn fat stored in your body to get the energy it needs and you will lose weight.  You can lose 1 pound a week by eating 500 fewer calories a day than you need to keep your present weight. Try to lose 1 to 2 pounds a week. If you lose more than that each week, you begin to lose muscle rather than fat.  Most weight loss diets suggest 1200 to 1500 calories a day for women and 1500 to 1800 calories a day for men. However, calorie needs can vary a lot. They depend on your activity level and current weight. Ask your healthcare provider how many calories you need a day.  Don't lower your calories too much. If you get too few calories a day, your body will slow down your metabolism so that you can survive the lean time. This can happen if you go on a \"starvation diet.\" The body's survival response will then stop you from losing weight.  The rate at which you can lose weight depends on your body's metabolism. This is the rate at which you use energy, or calories, for basic functions such as eating, sleeping, walking, etc. You may increase your body's rate of metabolism by regularly engaging in physical activity. Weight loss may occur more quickly at the start of a diet because the body releases extra water that was retained.    What are the dietary guidelines for losing weight?   In general, follow these guidelines:  Write down everything you eat and drink. This lets you see if you are eating a good variety of foods. Also, it allows you to count your daily calories, if you choose.   Drink plenty of water each day.   Choose unlimited amounts of vegetables and salads, but take care to add only small amounts of dressings and sauces to these foods.   Choose: "   lean meats, poultry, fish, or soy protein   baked or broiled meat, fish, or poultry   salad dressing containing little or no oil.  Include the following foods in your diet every day but in appropriate amounts:   nonfat dairy products   legumes (lentils, peas, and beans)   unrefined carbohydrates (whole wheat bread, whole oats or oatmeal, whole grain cereals without sugar, brown rice, bulgur (cracked wheat), and low-fat popcorn)   raw fruits and canned fruits in their own juices, water, or light syrup.  Limit how much you eat of the following:   Sugar and foods containing a lot of added sugar. Foods with sugar can fit into a weight loss diet if they are viewed as a treat and eaten in small portions less often. The American Heart Association recommends that most women should limit added sugar to no more than 5 teaspoons a day (80 calories). Most men should add no more than 9 teaspoons a day (144 calories). When you read labels, remember that 1 teaspoon of sugar is 4 grams and 16 calories. Look for 100-calorie/serving snack foods to help you with portion and calorie control.   Refined grain products such as white rice and white flour. Refined grain products should make up no more than one half of your daily servings of grain. Eat products made with whole grains instead of white flour whenever you can.  Limit:   Saturated fats such as the fat in meat, poultry skin, butter, cheese, and other whole-milk products, and trans fats found in stick margarine, shortening, and many snack foods such as crackers, chips, and sweet baked goods. Look for products that have less than 2 grams of saturated fat per serving and 0 grams trans fat per serving.   High-fat sweets, snack foods, and fried foods. Enjoy them as an occasional treat. Even when foods contain healthy fats, such as oils, soft margarine and mayonnaise, all fat is high calorie. Try to keep added fats to no more than a few tablespoons a day.   Processed meats because  they are often high in fat, salt, and preservatives. Look for low-fat, low-salt products.   Alcoholic beverages. Men should have no more than 2 servings a day and women should have no more than 1 serving a day. Alcoholic drinks have calories without nutrition. They can also make you hungrier.  To have a balanced diet, be sure to choose a variety of foods from the basic food groups:   dairy   meat and other protein   vegetables   fruit   whole-grain breads, cereal, and pasta   healthy fats (olive, canola, and soybean oils and the fat found in nuts, seeds, soybeans, fish, and avocado)  Sit down and relax while you eat your meals. Avoid distractions such as the phone and TV. Chewing your food thoroughly helps digestion. Eating small, frequent meals instead of 3 full meals is helpful. You should eat every 4 to 5 hours. This keeps your blood sugar at a constant level and helps keep you from feeling hungry. Finish your meals with a piece of fruit instead of a sweet dessert.  What are some of the popular diets?   There are many popular diets. Some are considered to be fad diets and unsafe for the long term. Others are healthy and may be right for you. Remember that no one diet works for everyone. Some broad categories of popular diets are:  High-protein, low-carbohydrate diets result in a quick loss of weight right away. Two well known examples of this type of diet are the Atkins Diet and the [place] Diet. Most of these diets allow unlimited amounts of high-protein foods and limit other food groups. Carbohydrate content varies but usually is very low at first. The amount of fat allowed in the diets can vary. Diets that have low amounts of saturated fat and move quickly to adding other food groups back to the diet are healthier. Research has yet to determine the long-term benefits or risks of high-protein, low-carb diets. Recent studies of people following the Atkins Diet showed that they lowered their triglyceride  levels (unhealthy blood fat) and increased their HDL (good cholesterol) even though their diet was rich in saturated fat. A possible risk is that this diet limits foods that help lower the risk for cancer, heart disease, diabetes, and other health problems, such as whole grains, fruits, and vegetables.   Specific food diets limit your diet to a few specific foods. They are a type of fad diet. These diets do not count calories, are boring, and depend mostly on will power to follow a diet that has very little variety. You may start having vitamin and mineral deficiencies after just a few days on one of these diets. Examples of these diets are the grapefruit diet and the cabbage diet.   Balanced nutrition diet plans are higher carbohydrate, low saturated-fat diets that more closely follow the Dietary Guidelines for Americans and guidelines recommended by the American Heart Association and Cancer Society. Examples of this type of diet are the [facility] Healthy Weight Pyramid, American Heart Association, and Mediterranean diets.   Calorie-conscious commercial programs and weight loss clinics offer group support and motivation for the dieter, a wide variety of foods, and meal plans of 500 to 1500 calories a day. These programs are often costly. Some should not be followed without supervision by your healthcare provider. Some programs, such as Weight Watchers, can give excellent support for changing bad eating habits and sticking to your weight loss diet.  Each year more and more diet books are published, each promising great results. Some diets are very low calorie (less than 500 calories a day) or total fasting. These diets can be dangerous--even life-threatening--and must be supervised by your healthcare provider. It is hard to keep up or know which diets are based on good science. You can read diet reviews by nutrition professionals on the American Dietetic Association Web site:  http://www.eatright.org/Media/content.aspx?qn=823&terms=diet+reviews  What are the physical activity guidelines for losing weight?  Physical activity is as important as diet if you are trying to lose weight and then maintain a healthy weight.  It helps you lose weight because you burn more calories while you exercise.   It lowers your blood pressure, cholesterol level, and blood sugar level.   It makes you feel more energetic.   It improves muscle tone.   It helps you sleep better.  Don't overdo it at first. Moderate walking for 15 to 30 minutes 3 to 6 times a week is a good start. With your healthcare provider's approval, your goal should be 30 to 90 minutes of moderate exercise a day, most days of the week. Moderate aerobic exercise is generally defined as requiring the energy it takes to walk 2 miles in 30 minutes. You may need to exercise 60 minutes a day to prevent weight gain and 90 minutes a day to lose weight. Be sure to check with your healthcare provider before starting your exercise program.       Published by [employer].  This content is reviewed periodically and is subject to change as new health information becomes available. The information is intended to inform and educate and is not a replacement for medical evaluation, advice, diagnosis or treatment by a healthcare professional.  Developed by [employer].    2011 [employer] and/or its affiliates. All rights reserved.       [facility]                        To reach your care team during and after hours:   [phone]  To reach our pharmacy:        [phone]    Clinic Hours                        Our clinic hours are:    Monday   7:30 am to 7:00 pm                  Tuesday through Friday 7:30 am to 5:00 pm                             Saturday   8:00 am to 12:00 pm      Sunday   Closed      Pharmacy Hours                        Our pharmacy hours are:    Monday   8:30 am to 7:00 pm       Tuesday to Friday  8:30 am to 6:00  pm                       Saturday    9:00 am to 1:00 pm              Sunday    Closed              There is also information available at our web site:  [email or web address]    If your provider ordered any lab tests and you do not receive the results within 10 business days, please call the clinic.    If you need a medication refill please contact your pharmacy.  Please allow 2-3 business days for your refill to be completed.    Our clinic offers telephone visits and e visits.  Please ask one of your team members to explain more.      Use [employer] (secure email communication and access to your chart) to send your primary care provider a message or make an appointment. Ask someone on your Team how to sign up for [employer].  Immunizations                      Immunization History   Administered Date(s) Administered     HEPA 06/16/2011     Influenza (H1N1) 01/18/2010     Influenza (IIV3) PF 10/05/2009, 12/03/2010, 10/18/2011, 09/27/2012, 10/18/2013, 10/19/2016     Influenza Vaccine IM 3yrs+ 4 Valent IIV4 09/26/2014     Influenza Vaccine, 3 YRS +, IM (QUADRIVALENT W/PRESERVATIVES) 11/01/2015, 10/01/2017     TD (ADULT, 7+) 02/21/2006     TDAP Vaccine (Adacel) 03/29/2013        Health Maintenance                         Health Maintenance Due   Topic Date Due     HIV SCREEN (SYSTEM ASSIGNED)  08/19/1985

## 2018-05-30 NOTE — LETTER
[facility]  [address] [place] [address], [place] [address]                  [phone]   June 4, 2018    Joycelyn Laird  [address] [facility] [address]      Dear Joycelyn,    Here is a summary of your recent test results:    Labs are all negative.     We advise:     Continue cares as discussed.     For additional lab test information, [email or web address] is an excellent reference.     Let us know if you have any questions or concerns.     Thank you for choosing us for your health care needs!     Your test results are enclosed.      Please contact me if you have any questions.    In addition, here is a list of due or overdue Health Maintenance reminders.    There are no preventive care reminders to display for this patient.    Please call us at [phone] (or use [email or web address]) to address the above recommendations.            Thank you very much for trusting [facility]..     Healthy regards,        Paulo Arriola M.D.        Results for orders placed or performed in visit on 05/30/18   Hepatic panel   Result Value Ref Range    Bilirubin Direct 0.1 0.0 - 0.2 mg/dL    Bilirubin Total 0.4 0.2 - 1.3 mg/dL    Albumin 4.1 3.4 - 5.0 g/dL    Protein Total 7.9 6.8 - 8.8 g/dL    Alkaline Phosphatase 57 40 - 150 U/L    ALT 42 0 - 50 U/L    AST 33 0 - 45 U/L   HIV Antigen Antibody Combo   Result Value Ref Range    HIV Antigen Antibody Combo Nonreactive NR^Nonreactive

## 2018-05-30 NOTE — MR AVS SNAPSHOT
After Visit Summary   5/30/2018    Joycelyn Laird    MRN: [id number]           Patient Information     Date Of Birth          1965        Visit Information        Provider Department      5/30/2018 1:40 PM Paulo Arriola MD [facility] [place]        Today's Diagnoses     Abnormal liver function tests    -  1    Fatty liver        CARDIOVASCULAR SCREENING; LDL GOAL LESS THAN 130        Class 1 obesity without serious comorbidity with body mass index (BMI) of 32.0 to 32.9 in adult, unspecified obesity type        Encounter for screening for HIV          Care Instructions    Inquire with your insurance company about Shingrix (shingles vaccine), series of 2 injections    Follow up for mammogram this summer     CALCIUM    Recommendations:  Teenagers and premenopausal women: 1200 mg/day  Pregnant and Lactating women: 1500 mg/day  Men and Postmenopausal women on estrogen: 1200mg/day  Postmenopausal women not on estrogen: 1500 mg/day    If you are not eating dairy products you also need 400 IU of vitamin D per day which can be obtained in either a multivitamin or in some of the Calcium tablets.    Dietary sources: These also contain vitamin D  Milk                            8 oz            300 mg  Yogurt                          1 cup           400 mg  Hard cheese                     1.5 oz          300 mg  Cottage cheese                  2 cup           300 mg  Orange juice with Calcium       8 oz            300 mg  Low fat dairy sources are recommended    Supplements:  Tums EX                         300 mg  Tums Ultra                      400 mg  Caltrate 600                    600 mg  Oscal                           500 mg  Oscal/D                         500 mg plus vitamin D  Women's Formula Multivitamin    450 mg                Losing Weight      Weight Loss Diets  Why do I need to lose weight if I am overweight?   Being overweight increases your risk for high blood pressure, heart disease,  "stroke, diabetes, and cancer. If you are overweight, losing just 5 to 10% of your weight and keeping it off lowers your risk for most of these diseases. Your healthcare provider can give you a good idea of how your weight increases your risk.  How can I know if I am overweight?  The number you see on the scale doesn't necessarily tell you whether you need to lose weight. That's because 2 people of the same height and weight can have different bone structures. They may carry different amounts of muscle and body fat. For most adults, determining your body mass index (BMI) and waist size are reliable ways to tell whether you are overweight and to estimate your risk for health problems.  The BMI uses your height and weight to estimate how much fat is on your body. A BMI of at least 25 indicates overweight. A BMI of 30 or more indicates you are obese. Generally, the higher your BMI, the higher your health risk.  Your waist size indicates whether you have an apple body shape and tend to carry fat around your midsection. Your health risks increase even further with increasing waist size. A waist measurement greater than 40 inches for men or 35 inches for women indicates a significant increase in health risk, particularly for heart disease and diabetes.  To tell whether your weight is a health risk, you can determine your BMI and health risk with the Body Mass Index chart. Measure your waist at the point below your ribcage but above your navel. Use your BMI and waist size to determine your risk from the \"Calculating Your Risk\" table included with the BMI chart.  There are some limits to the usefulness of the BMI score. It may overestimate body fat in athletes and others who have a muscular build. It may underestimate body fat in older persons and others who have lost muscle mass. Your healthcare provider can give you a good sense of whether you have an increased risk of health problems because of your weight. Your provider " can also help you find a weight-loss program that works for you.  The BMI chart is not for use in pregnancy. If you are pregnant and want a guide to normal pregnancy weight gain, ask your healthcare provider for a chart.  What can I do to lose weight?  If you want to lose weight, you can begin with a safe, healthy, well-balanced weight-loss diet. However, the most effective weight management program is not limited to diet. Rather, it involves changes in your lifestyle, including your eating and physical activity habits, which you will be able to continue for the rest of your life.  A plan for weight reduction should include good nutrition, fewer calories, and physical activity. The best sources for information about a safe, healthy, effective weight reduction program are dietitians and healthcare providers.  A good weight loss plan includes:  a healthy diet   physical activity   understanding the emotions behind your eating patterns.  To start your program for losing weight:  Determine your weight goal.   Learn how many calories you need each day for a healthy weight.   Discuss with a dietitian or healthcare provider how to choose healthy and satisfying foods to get those calories.   Find ways to increase your physical activity. Plan a schedule for getting regular exercise and stick to it.   Learn how you use food for reasons besides nutrition. For example, do you eat when you are bored or stressed? Do you reward yourself with food? Make changes to prevent these behaviors. For example, allow yourself to eat only at certain places, such as the cafeteria or break room at work and the kitchen or dining table at home. Do not eat meals or snacks in the car or in front of the TV.  Changes that will help you lose weight include:  a better understanding of your own health   healthier eating habits   more physical activity   rewarding yourself for losing weight  Healthy diets for losing weight involve:  making smart choices  from every food group: fruits, vegetables, grains, milk products, meat, and fats   finding a balance between how much food you eat and how much exercise you get   getting the most nutrition from your calories.  Losing weight most often means eating fewer calories and avoiding some foods. However, a weight loss diet needs to give enough nutrition and a good variety of satisfying foods as well as fewer calories.  What works best is a gradual change in your habits of eating and physical activity--a change that you can continue the rest of your life. The ideal diet helps you lose weight slowly but steadily, so you can keep a healthy weight after you have reached your goal. The best weight loss plan is one that fits your needs and food preferences.   Ask your healthcare provider for a safe, healthy, and effective weight loss program.  What foods should I choose to lose weight?   A healthy eating plan is one that:  Includes a lot of fruits, vegetables, whole grains, and beans.   Includes fat-free or low-fat milk products.   Includes lean meats, poultry, fish, eggs or egg whites, nuts, seeds, and soy foods.   Is low in saturated fats, trans fats, cholesterol, salt, and added sugars.  Keep track of everything you eat in a food diary. As soon as you eat or drink, write it down. It may be helpful to use a small pocket diary. Seeing what you eat and drink will help you learn more about your eating patterns and food habits.  What foods should I limit or avoid?  Try to avoid the following types of food:  refined carbohydrates (sugar) and foods containing added sugars, such as sucrose, glucose, dextrose, high-fructose corn syrup, corn sweetener, honey, and brown sugar   refined grain products such as white rice and white flour (try to substitute whole grains for refined grains whenever you can).  Also avoid:  saturated fats such as butter, cream cheese, poultry skin, whole-milk dairy products (including cheese), and fat on meats  "  other foods that often contain a lot of fat and trans fats, such as pastries, cakes, cookies, potato chips, and crackers   fried foods   packaged meats because they are often high in fat, salt, and preservatives (look for low-fat, low-salt varieties).  Alcoholic drinks add calories to your diet with very little nutrition. If you choose to drink alcohol, do so in moderation. Moderate drinking means up to 1 drink a day for women and up to 2 drinks for men. A drink equals 12 ounces of regular beer, 5 ounces of wine, or 1 and 1/2 ounces of 80-proof distilled spirits.  What are calories?   A calorie is a way to measure the energy value of food. Your body burns calories for energy. Proteins, carbohydrates, and fats contain calories. To lose weight, eat fewer calories without giving up nutrition. Burn more calories with more physical activity. Your body will burn fat stored in your body to get the energy it needs and you will lose weight.  You can lose 1 pound a week by eating 500 fewer calories a day than you need to keep your present weight. Try to lose 1 to 2 pounds a week. If you lose more than that each week, you begin to lose muscle rather than fat.  Most weight loss diets suggest 1200 to 1500 calories a day for women and 1500 to 1800 calories a day for men. However, calorie needs can vary a lot. They depend on your activity level and current weight. Ask your healthcare provider how many calories you need a day.  Don't lower your calories too much. If you get too few calories a day, your body will slow down your metabolism so that you can survive the lean time. This can happen if you go on a \"starvation diet.\" The body's survival response will then stop you from losing weight.  The rate at which you can lose weight depends on your body's metabolism. This is the rate at which you use energy, or calories, for basic functions such as eating, sleeping, walking, etc. You may increase your body's rate of metabolism by " regularly engaging in physical activity. Weight loss may occur more quickly at the start of a diet because the body releases extra water that was retained.    What are the dietary guidelines for losing weight?   In general, follow these guidelines:  Write down everything you eat and drink. This lets you see if you are eating a good variety of foods. Also, it allows you to count your daily calories, if you choose.   Drink plenty of water each day.   Choose unlimited amounts of vegetables and salads, but take care to add only small amounts of dressings and sauces to these foods.   Choose:   lean meats, poultry, fish, or soy protein   baked or broiled meat, fish, or poultry   salad dressing containing little or no oil.  Include the following foods in your diet every day but in appropriate amounts:   nonfat dairy products   legumes (lentils, peas, and beans)   unrefined carbohydrates (whole wheat bread, whole oats or oatmeal, whole grain cereals without sugar, brown rice, bulgur (cracked wheat), and low-fat popcorn)   raw fruits and canned fruits in their own juices, water, or light syrup.  Limit how much you eat of the following:   Sugar and foods containing a lot of added sugar. Foods with sugar can fit into a weight loss diet if they are viewed as a treat and eaten in small portions less often. The American Heart Association recommends that most women should limit added sugar to no more than 5 teaspoons a day (80 calories). Most men should add no more than 9 teaspoons a day (144 calories). When you read labels, remember that 1 teaspoon of sugar is 4 grams and 16 calories. Look for 100-calorie/serving snack foods to help you with portion and calorie control.   Refined grain products such as white rice and white flour. Refined grain products should make up no more than one half of your daily servings of grain. Eat products made with whole grains instead of white flour whenever you can.  Limit:   Saturated fats such as  the fat in meat, poultry skin, butter, cheese, and other whole-milk products, and trans fats found in stick margarine, shortening, and many snack foods such as crackers, chips, and sweet baked goods. Look for products that have less than 2 grams of saturated fat per serving and 0 grams trans fat per serving.   High-fat sweets, snack foods, and fried foods. Enjoy them as an occasional treat. Even when foods contain healthy fats, such as oils, soft margarine and mayonnaise, all fat is high calorie. Try to keep added fats to no more than a few tablespoons a day.   Processed meats because they are often high in fat, salt, and preservatives. Look for low-fat, low-salt products.   Alcoholic beverages. Men should have no more than 2 servings a day and women should have no more than 1 serving a day. Alcoholic drinks have calories without nutrition. They can also make you hungrier.  To have a balanced diet, be sure to choose a variety of foods from the basic food groups:   dairy   meat and other protein   vegetables   fruit   whole-grain breads, cereal, and pasta   healthy fats (olive, canola, and soybean oils and the fat found in nuts, seeds, soybeans, fish, and avocado)  Sit down and relax while you eat your meals. Avoid distractions such as the phone and TV. Chewing your food thoroughly helps digestion. Eating small, frequent meals instead of 3 full meals is helpful. You should eat every 4 to 5 hours. This keeps your blood sugar at a constant level and helps keep you from feeling hungry. Finish your meals with a piece of fruit instead of a sweet dessert.  What are some of the popular diets?   There are many popular diets. Some are considered to be fad diets and unsafe for the long term. Others are healthy and may be right for you. Remember that no one diet works for everyone. Some broad categories of popular diets are:  High-protein, low-carbohydrate diets result in a quick loss of weight right away. Two well known  examples of this type of diet are the Atkins Diet and the Meridian Diet. Most of these diets allow unlimited amounts of high-protein foods and limit other food groups. Carbohydrate content varies but usually is very low at first. The amount of fat allowed in the diets can vary. Diets that have low amounts of saturated fat and move quickly to adding other food groups back to the diet are healthier. Research has yet to determine the long-term benefits or risks of high-protein, low-carb diets. Recent studies of people following the Atkins Diet showed that they lowered their triglyceride levels (unhealthy blood fat) and increased their HDL (good cholesterol) even though their diet was rich in saturated fat. A possible risk is that this diet limits foods that help lower the risk for cancer, heart disease, diabetes, and other health problems, such as whole grains, fruits, and vegetables.   Specific food diets limit your diet to a few specific foods. They are a type of fad diet. These diets do not count calories, are boring, and depend mostly on will power to follow a diet that has very little variety. You may start having vitamin and mineral deficiencies after just a few days on one of these diets. Examples of these diets are the grapefruit diet and the cabbage diet.   Balanced nutrition diet plans are higher carbohydrate, low saturated-fat diets that more closely follow the Dietary Guidelines for Americans and guidelines recommended by the American Heart Association and Cancer Society. Examples of this type of diet are the [facility] Healthy Weight Pyramid, American Heart Association, and Mediterranean diets.   Calorie-conscious commercial programs and weight loss clinics offer group support and motivation for the dieter, a wide variety of foods, and meal plans of 500 to 1500 calories a day. These programs are often costly. Some should not be followed without supervision by your healthcare provider. Some programs,  such as Weight Watchers, can give excellent support for changing bad eating habits and sticking to your weight loss diet.  Each year more and more diet books are published, each promising great results. Some diets are very low calorie (less than 500 calories a day) or total fasting. These diets can be dangerous--even life-threatening--and must be supervised by your healthcare provider. It is hard to keep up or know which diets are based on good science. You can read diet reviews by nutrition professionals on the American Dietetic Association Web site: http://www.eatright.org/Media/content.aspx?qf=766&terms=diet+reviews  What are the physical activity guidelines for losing weight?  Physical activity is as important as diet if you are trying to lose weight and then maintain a healthy weight.  It helps you lose weight because you burn more calories while you exercise.   It lowers your blood pressure, cholesterol level, and blood sugar level.   It makes you feel more energetic.   It improves muscle tone.   It helps you sleep better.  Don't overdo it at first. Moderate walking for 15 to 30 minutes 3 to 6 times a week is a good start. With your healthcare provider's approval, your goal should be 30 to 90 minutes of moderate exercise a day, most days of the week. Moderate aerobic exercise is generally defined as requiring the energy it takes to walk 2 miles in 30 minutes. You may need to exercise 60 minutes a day to prevent weight gain and 90 minutes a day to lose weight. Be sure to check with your healthcare provider before starting your exercise program.       Published by [employer].  This content is reviewed periodically and is subject to change as new health information becomes available. The information is intended to inform and educate and is not a replacement for medical evaluation, advice, diagnosis or treatment by a healthcare professional.  Developed by [employer].    2011 [employer] and/or its affiliates.  All rights reserved.       [facility]                        To reach your care team during and after hours:   [phone]  To reach our pharmacy:        [phone]    Clinic Hours                        Our clinic hours are:    Monday   7:30 am to 7:00 pm                  Tuesday through Friday 7:30 am to 5:00 pm                             Saturday   8:00 am to 12:00 pm      Sunday   Closed      Pharmacy Hours                        Our pharmacy hours are:    Monday   8:30 am to 7:00 pm       Tuesday to Friday  8:30 am to 6:00 pm                       Saturday    9:00 am to 1:00 pm              Sunday    Closed              There is also information available at our web site:  [email or web address]    If your provider ordered any lab tests and you do not receive the results within 10 business days, please call the clinic.    If you need a medication refill please contact your pharmacy.  Please allow 2-3 business days for your refill to be completed.    Our clinic offers telephone visits and e visits.  Please ask one of your team members to explain more.      Use [employer] (secure email communication and access to your chart) to send your primary care provider a message or make an appointment. Ask someone on your Team how to sign up for [employer].  Immunizations                      Immunization History   Administered Date(s) Administered     HEPA 06/16/2011     Influenza (H1N1) 01/18/2010     Influenza (IIV3) PF 10/05/2009, 12/03/2010, 10/18/2011, 09/27/2012, 10/18/2013, 10/19/2016     Influenza Vaccine IM 3yrs+ 4 Valent IIV4 09/26/2014     Influenza Vaccine, 3 YRS +, IM (QUADRIVALENT W/PRESERVATIVES) 11/01/2015, 10/01/2017     TD (ADULT, 7+) 02/21/2006     TDAP Vaccine (Adacel) 03/29/2013        Health Maintenance                         Health Maintenance Due   Topic Date Due     HIV SCREEN (SYSTEM ASSIGNED)  08/19/1985               Follow-ups after your visit        Follow-up notes from your care  "team     Return in about 1 year (around 5/30/2019), or if symptoms worsen or fail to improve, for Physical Exam.      Who to contact     If you have questions or need follow up information about today's clinic visit or your schedule please contact [facility] directly at [phone].  Normal or non-critical lab and imaging results will be communicated to you by MyChart, letter or phone within 4 business days after the clinic has received the results. If you do not hear from us within 7 days, please contact the clinic through [employer] or phone. If you have a critical or abnormal lab result, we will notify you by phone as soon as possible.  Submit refill requests through [employer] or call your pharmacy and they will forward the refill request to us. Please allow 3 business days for your refill to be completed.          Additional Information About Your Visit        MyChart Information     [employer] gives you secure access to your electronic health record. If you see a primary care provider, you can also send messages to your care team and make appointments. If you have questions, please call your primary care clinic.  If you do not have a primary care provider, please call [phone] and they will assist you.        Care EveryWhere ID     This is your Care EveryWhere ID. This could be used by other organizations to access your [place] medical records  [id number]        Your Vitals Were     Pulse Temperature Height Last Period Pulse Oximetry BMI (Body Mass Index)    68 98.2  F (36.8  C) (Oral) 5' 8\" (1.727 m) 10/29/2011 98% 32.23 kg/m2       Blood Pressure from Last 3 Encounters:   05/30/18 130/82   01/02/18 122/80   12/27/17 118/72    Weight from Last 3 Encounters:   05/30/18 212 lb (96.2 kg)   01/02/18 232 lb 8 oz (105.5 kg)   12/27/17 229 lb (103.9 kg)              We Performed the Following     Hepatic panel     HIV Antigen Antibody Combo        Primary Care Provider Office Phone # Fax #    Gauri PROCTOR " MD Sharath [phone]       [address] [facility] [address]  [facility] [address]        Equal Access to Services     REEMA THRASHER : Hadii aad ku hadmilancaleb Smith, shayrnnawaf patelerasmoha, javed devangmanish gagnon, radha spann jmmichael sandoval ladonaldtessie ludwig. So [facility] [phone].    ATENCIÓN: Si habla español, tiene a alonzo disposición servicios gratuitos de asistencia lingüística. Llame al [phone].    We comply with applicable federal civil rights laws and [place] laws. We do not discriminate on the basis of race, color, national origin, age, disability, sex, sexual orientation, or gender identity.            Thank you!     Thank you for choosing [facility]  for your care. Our goal is always to provide you with excellent care. Hearing back from our patients is one way we can continue to improve our services. Please take a few minutes to complete the written survey that you may receive in the mail after your visit with us. Thank you!             Your Updated Medication List - Protect others around you: Learn how to safely use, store and throw away your medicines at www.disposemymeds.org.          This list is accurate as of 5/30/18  2:22 PM.  Always use your most recent med list.                   Brand Name Dispense Instructions for use Diagnosis    clobetasol 0.05 % cream    TEMOVATE     Apply topically as needed        fish oil-omega-3 fatty acids 1000 MG capsule     90 capsule    Take 1 g by mouth every other day        Flaxseed Oil 1200 MG Caps      Take by mouth every other day        fluocinonide 0.05 % cream    LIDEX    30 g    Apply small amount to affected area bid as needed    Eczema, unspecified type       MULTIVITAMIN TABS   OR

## 2018-05-30 NOTE — PROGRESS NOTES
SUBJECTIVE:   Joycelyn Laird is a 52 year old female who presents to clinic today for the following health issues:    Review labs that were done on an insurance physical --     Past/recent records reviewed and discussed for -- family hx, social hx, surgical hx, medications, immunizations, allergies.    Problem list and histories reviewed & adjusted, as indicated.  Additional history: as documented    Reviewed and updated as needed this visit by clinical staff  Tobacco  Allergies  Meds  Med Hx  Surg Hx  Fam Hx  Soc Hx      Reviewed and updated as needed this visit by Provider  Allergies       BP Readings from Last 3 Encounters:   18 130/82   18 122/80   17 118/72     Wt Readings from Last 4 Encounters:   18 212 lb (96.2 kg)   18 232 lb 8 oz (105.5 kg)   17 229 lb (103.9 kg)   17 230 lb 9.6 oz (104.6 kg)       Health Maintenance    Health Maintenance Due   Topic Date Due     HIV SCREEN (SYSTEM ASSIGNED)  1985       Current Problem List    Patient Active Problem List   Diagnosis     Primary localized osteoarthrosis, ankle and foot     Fatty liver     Eczema     CARDIOVASCULAR SCREENING; LDL GOAL LESS THAN 130     Class 1 obesity without serious comorbidity with body mass index (BMI) of 32.0 to 32.9 in adult, unspecified obesity type       Past Medical History    Past Medical History:   Diagnosis Date     CARDIOVASCULAR SCREENING; LDL GOAL LESS THAN 130      Eczema 2016     Fatty liver      Mild or unspecified pre-eclampsia, unspecified as to episode of care     toxemia pregnancy-twins     Primary localized osteoarthrosis, ankle and foot 10/18/2011       Past Surgical History    Past Surgical History:   Procedure Laterality Date      SECTION       - twins     COLONOSCOPY N/A 2017    normal, due 5 yrs     DENTAL SURGERY      wisdom tooth     SURGICAL HISTORY OF -   2018    endometrial biopsy benign - Ob/Gyn - Dr Moore        Current Medications    Current Outpatient Prescriptions   Medication Sig Dispense Refill     clobetasol (TEMOVATE) 0.05 % cream Apply topically as needed       fish oil-omega-3 fatty acids (OMEGA 3) 1000 MG capsule Take 1 g by mouth every other day  90 capsule 3     Flaxseed, Linseed, (FLAXSEED OIL) 1200 MG CAPS Take by mouth every other day        fluocinonide (LIDEX) 0.05 % cream Apply small amount to affected area bid as needed 30 g 2     MULTIVITAMIN TABS   OR          Allergies    No Known Allergies    Immunizations    Immunization History   Administered Date(s) Administered     HEPA 06/16/2011     Influenza (H1N1) 01/18/2010     Influenza (IIV3) PF 10/05/2009, 12/03/2010, 10/18/2011, 09/27/2012, 10/18/2013, 10/19/2016     Influenza Vaccine IM 3yrs+ 4 Valent IIV4 09/26/2014     Influenza Vaccine, 3 YRS +, IM (QUADRIVALENT W/PRESERVATIVES) 11/01/2015, 10/01/2017     TD (ADULT, 7+) 02/21/2006     TDAP Vaccine (Adacel) 03/29/2013       Family History    Family History   Problem Relation Age of Onset     Lipids Mother      Hypertension Mother      Lipids Father      Hypertension Father      C.A.D. Father 68     Prostate Cancer Father      Prostrate removed     Peripheral Vascular Disease Father      Colon Cancer Brother      Preventative - suspicious cells     Coronary Artery Disease Paternal Grandfather        Social History    Social History     Social History     Marital status:      Spouse name: Phu     Number of children: 2     Years of education: 16     Occupational History      None      Social History Main Topics     Smoking status: Never Smoker     Smokeless tobacco: Never Used     Alcohol use 0.0 - 0.6 oz/week     0 - 1 Standard drinks or equivalent per week     Drug use: No     Sexual activity: Yes     Partners: Male     Birth control/ protection: Post-menopausal     Other Topics Concern     Exercise No     Seat Belt Yes     Self-Exams Yes     Occaisionally     Parent/Sibling W/ Cabg, Mi Or  "Angioplasty Before 65f 55m? No     Social History Narrative       All above reviewed and updated, all stable unless otherwise noted    Recent labs reviewed    ROS:  Constitutional, HEENT, cardiovascular, pulmonary, GI, , musculoskeletal, neuro, skin, endocrine and psych systems are negative, except as in HPI or otherwise noted     This document serves as a record of the services and decisions personally performed and made by Paulo Arriola MD FAA. It was created on their behalf by George Maya, a trained medical scribe. The creation of this document is based the provider's statements to the medical scribe.  George Maya May 30, 2018 1:54 PM      OBJECTIVE:                                                    /82  Pulse 68  Temp 98.2  F (36.8  C) (Oral)  Ht 5' 8\" (1.727 m)  Wt 212 lb (96.2 kg)  LMP 10/29/2011  SpO2 98%  BMI 32.23 kg/m2  Body mass index is 32.23 kg/(m^2).  GENERAL: healthy, alert and no distress, obese   HENT: ear canals and TM's normal upon viewing with otoscope, nose and mouth without ulcers or lesions upon viewing with otoscope  RESP: lungs clear to auscultation - no rales, no rhonchi, no wheezes  CV: regular rates and rhythm, normal S1 S2, no S3 or S4 and no murmur, no click or rub -  ABDOMEN: soft, no tenderness, no  hepatosplenomegaly, no masses, normal bowel sounds  MS: extremities- no gross deformities noted, no edema  SKIN: no suspicious lesions, no rashes to visible skin  NEURO: mentation intact and speech normal  BACK: no CVA tenderness, no paralumbar tenderness  PSYCH: affect normal    DIAGNOSTICS/PROCEDURES:                                                      No results found for this or any previous visit (from the past 24 hour(s)).     ASSESSMENT/PLAN:                                                        ICD-10-CM    1. Abnormal liver function tests R79.89 Hepatic panel   2. Fatty liver K76.0 Hepatic panel   3. CARDIOVASCULAR SCREENING; LDL GOAL LESS THAN 130 Z13.6    4. " Class 1 obesity without serious comorbidity with body mass index (BMI) of 32.0 to 32.9 in adult, unspecified obesity type E66.9     Z68.32    5. Encounter for screening for HIV Z11.4 HIV Antigen Antibody Combo     Discussed treatment/modality options, including risk and benefits, he desires advised calcium 7076-6809 mg/d and Vitamin D 800-1200 IU/d, advised diet, exercise, and weight loss, diet discussed, follow up with another visit, further diagnostic(s), further health care maintenance, further lab(s), OTC meds and observation. All diagnosis above reviewed and noted above, otherwise stable.  See [email or web address] orders for further details.      Return in about 1 year (around 5/30/2019), or if symptoms worsen or fail to improve, for Physical Exam.    Health Maintenance Due   Topic Date Due     HIV SCREEN (SYSTEM ASSIGNED)  08/19/1985     Patient Instructions     Inquire with your insurance company about Shingrix (shingles vaccine), series of 2 injections    Follow up for mammogram this summer     The information in this document, created by the medical scribe for me, accurately reflects the services I personally performed and the decisions made by me. I have reviewed and approved this document for accuracy.   Paulo Arriola MD FAAFP            Paulo Arriola MD [address]  [address] [phone] Fax

## 2018-05-31 LAB
ALBUMIN SERPL-MCNC: 4.1 G/DL (ref 3.4–5)
ALP SERPL-CCNC: 57 U/L (ref 40–150)
ALT SERPL W P-5'-P-CCNC: 42 U/L (ref 0–50)
AST SERPL W P-5'-P-CCNC: 33 U/L (ref 0–45)
BILIRUB DIRECT SERPL-MCNC: 0.1 MG/DL (ref 0–0.2)
BILIRUB SERPL-MCNC: 0.4 MG/DL (ref 0.2–1.3)
HIV 1+2 AB+HIV1 P24 AG SERPL QL IA: NONREACTIVE
PROT SERPL-MCNC: 7.9 G/DL (ref 6.8–8.8)

## 2018-06-04 ENCOUNTER — TELEPHONE (OUTPATIENT)
Dept: FAMILY MEDICINE | Facility: CLINIC | Age: 53
End: 2018-06-04

## 2018-06-04 NOTE — TELEPHONE ENCOUNTER
Pt asking for results -     Reviewed with pt the results notes     Patient stated an understanding and agreed with plan.    Elisabet Parrish RN, BSN  [facility]

## 2018-06-06 ENCOUNTER — ALLIED HEALTH/NURSE VISIT (OUTPATIENT)
Dept: NURSING | Facility: CLINIC | Age: 53
End: 2018-06-06
Payer: COMMERCIAL

## 2018-06-06 DIAGNOSIS — Z23 NEED FOR ZOSTER VACCINE: Primary | ICD-10-CM

## 2018-06-06 PROCEDURE — 90471 IMMUNIZATION ADMIN: CPT

## 2018-06-06 PROCEDURE — 90750 HZV VACC RECOMBINANT IM: CPT

## 2018-06-11 ENCOUNTER — TELEPHONE (OUTPATIENT)
Dept: FAMILY MEDICINE | Facility: CLINIC | Age: 53
End: 2018-06-11

## 2018-06-11 NOTE — TELEPHONE ENCOUNTER
It is very common and normal to have reactions like this at the site of a vaccination. It is not an allergy. It just means the immune system is responding and it would not mean she can not get the second shot. It may still get red and could even be a little more sensitive next time. She can use cool packs on it after the first 24 hours.

## 2018-07-12 ENCOUNTER — OFFICE VISIT (OUTPATIENT)
Dept: FAMILY MEDICINE | Facility: CLINIC | Age: 53
End: 2018-07-12
Payer: COMMERCIAL

## 2018-07-12 VITALS
OXYGEN SATURATION: 97 % | WEIGHT: 206 LBS | SYSTOLIC BLOOD PRESSURE: 128 MMHG | BODY MASS INDEX: 31.22 KG/M2 | TEMPERATURE: 98.1 F | HEIGHT: 68 IN | HEART RATE: 71 BPM | DIASTOLIC BLOOD PRESSURE: 82 MMHG

## 2018-07-12 DIAGNOSIS — J06.9 UPPER RESPIRATORY TRACT INFECTION, UNSPECIFIED TYPE: ICD-10-CM

## 2018-07-12 DIAGNOSIS — R07.0 THROAT PAIN: Primary | ICD-10-CM

## 2018-07-12 LAB
DEPRECATED S PYO AG THROAT QL EIA: NORMAL
SPECIMEN SOURCE: NORMAL

## 2018-07-12 PROCEDURE — 87081 CULTURE SCREEN ONLY: CPT | Performed by: FAMILY MEDICINE

## 2018-07-12 PROCEDURE — 87880 STREP A ASSAY W/OPTIC: CPT | Performed by: FAMILY MEDICINE

## 2018-07-12 PROCEDURE — 99213 OFFICE O/P EST LOW 20 MIN: CPT | Performed by: FAMILY MEDICINE

## 2018-07-12 NOTE — MR AVS SNAPSHOT
After Visit Summary   7/12/2018    Joycelyn Laird    MRN: [id number]           Patient Information     Date Of Birth          1965        Visit Information        Provider Department      7/12/2018 9:00 AM Kevin Nguyen MD [facility]        Today's Diagnoses     Upper respiratory tract infection, unspecified type    -  1    Throat pain           Follow-ups after your visit        Follow-up notes from your care team     Return in about 1 week (around 7/19/2018), or if symptoms worsen or fail to improve.      Your next 10 appointments already scheduled     Dec 03, 2018  9:30 AM CST   Nurse Only with RV MA/LPN   [facility]    [address] [place] [address] [phone]              Who to contact     If you have questions or need follow up information about today's clinic visit or your schedule please contact [facility] directly at [phone].  Normal or non-critical lab and imaging results will be communicated to you by [employer], letter or phone within 4 business days after the clinic has received the results. If you do not hear from us within 7 days, please contact the clinic through [employer] or phone. If you have a critical or abnormal lab result, we will notify you by phone as soon as possible.  Submit refill requests through [employer] or call your pharmacy and they will forward the refill request to us. Please allow 3 business days for your refill to be completed.          Additional Information About Your Visit        MyChart Information     [employer] gives you secure access to your electronic health record. If you see a primary care provider, you can also send messages to your care team and make appointments. If you have questions, please call your primary care clinic.  If you do not have a primary care provider, please call [phone] and they will assist you.        Care EveryWhere ID   "   This is your Care EveryWhere ID. This could be used by other organizations to access your [place] medical records  [id number]        Your Vitals Were     Pulse Temperature Height Last Period Pulse Oximetry BMI (Body Mass Index)    71 98.1  F (36.7  C) (Oral) 5' 8\" (1.727 m) 10/29/2011 97% 31.32 kg/m2       Blood Pressure from Last 3 Encounters:   07/12/18 128/82   05/30/18 130/82   01/02/18 122/80    Weight from Last 3 Encounters:   07/12/18 206 lb (93.4 kg)   05/30/18 212 lb (96.2 kg)   01/02/18 232 lb 8 oz (105.5 kg)              We Performed the Following     Strep, Rapid Screen        Primary Care Provider Office Phone # Fax #    Gauri Early [phone]       [address]  [facility] [address]        Equal Access to Services     GENE THRASHER : Hadii aad ku hadasho Soomaali, waaxda luqadaha, qaybta kaalmada adeegyada, waxay corinein maria ines garcia . So [facility] [phone].    ATENCIÓN: Si habla español, tiene a alonzo disposición servicios gratuitos de asistencia lingüística. Llame al [phone].    We comply with applicable federal civil rights laws and [place] laws. We do not discriminate on the basis of race, color, national origin, age, disability, sex, sexual orientation, or gender identity.            Thank you!     Thank you for choosing [facility]  for your care. Our goal is always to provide you with excellent care. Hearing back from our patients is one way we can continue to improve our services. Please take a few minutes to complete the written survey that you may receive in the mail after your visit with us. Thank you!             Your Updated Medication List - Protect others around you: Learn how to safely use, store and throw away your medicines at www.disposemymeds.org.          This list is accurate as of 7/12/18  9:39 AM.  Always use your most recent med list.                   Brand Name Dispense Instructions for use Diagnosis    " clobetasol 0.05 % cream    TEMOVATE     Apply topically as needed        fish oil-omega-3 fatty acids 1000 MG capsule     90 capsule    Take 1 g by mouth every other day        Flaxseed Oil 1200 MG Caps      Take by mouth every other day        fluocinonide 0.05 % cream    LIDEX    30 g    Apply small amount to affected area bid as needed    Eczema, unspecified type       MULTIVITAMIN TABS   OR

## 2018-07-12 NOTE — PROGRESS NOTES
"  SUBJECTIVE:                                                    Joycelyn Laird is a 52 year old female who presents to clinic today for the following health issues:    Acute Illness   Acute illness concerns: Fever- Sore throat  Onset: 2 days ago    Fever: YES- 2 days ago    Chills/Sweats: YES-2 days ago    Headache (location?): no    Sinus Pressure:no    Conjunctivitis:  no    Ear Pain: no    Rhinorrhea: no    Congestion: no    Sore Throat: YES     Cough: YES - occasional    Wheeze: no    Decreased Appetite: YES- because of sore throat    Nausea: no    Vomiting: no    Diarrhea:  YES    Dysuria/Freq.: no    Fatigue/Achiness: YES    Sick/Strep Exposure: YES- both sons are sick      Therapies Tried and outcome: tylenol to help fever      - Pt has a son at home who presented with illness on Saturday -- fever & sore throat. He then developed a rash on his face, spreading onto his scalp and hands. She developed similar symptoms [without the rash] on Tuesday morning. She is improving today, states she does still experience pain/discomfort with swallowing.          Problem list and histories reviewed & adjusted, as indicated.  Additional history: as documented    ROS:   Constitutional, HEENT, cardiovascular, pulmonary, GI, , musculoskeletal, neuro, skin, endocrine and psych systems are negative, except as otherwise noted.    This document serves as a record of the services and decisions personally performed and made by Kevin Nguyen MD. It was created on his behalf by Portia Darnell, a trained medical scribe. The creation of this document is based the provider's statements to the medical scribe.  Scribmaida Darnell 9:30 AM, July 12, 2018    OBJECTIVE:                     /82  Pulse 71  Temp 98.1  F (36.7  C) (Oral)  Ht 1.727 m (5' 8\")  Wt 93.4 kg (206 lb)  LMP 10/29/2011  SpO2 97%  BMI 31.32 kg/m2  GENERAL: no apparent distress  EYES: Conjunctiva are not injected, no discharge.  EARS: Left TM -no erythema, no " effusion,  not bulged.               Right TM -no erythema, no effusion,  not bulged.  NOSE: no discharge, no sinus tenderness  THROAT: mild erythema, mild swelling of right tonsil; otherwise no exudate, no lesions  NECK: supple, mild bilateral adenopathy.  CARDIAC: regular rate and rhythm, no murmur  RESP: clear, no wheezing, no rales, no rhonchi  ABD: soft, no distension, no tenderness  SKIN: No rashes    Diagnostic test results:  No results found for this or any previous visit (from the past 24 hour(s)).  ASSESSMENT/PLAN:                       Joycelyn was seen today for pharyngitis and fever.    Diagnoses and all orders for this visit:    Upper respiratory tract infection, unspecified type/Throat pain - Rapid Screen negative, Culture pending; Advised to hydrate & use salty foods/drinks to alleviate sore throat; Report if new fever occurs or symptoms worsen  -     Strep, Rapid Screen        Symptomatic cares and fever control(if indicated) discussed.  Risks and benefits of meds discussed.    See patient instruction.        The information in this document, created by the medical scribe for me, accurately reflects the services I personally performed and the decisions made by me. I have reviewed and approved this document for accuracy prior to leaving the patient care area.  9:30 AM, 07/12/18        Tacos Nguyen MD

## 2018-07-13 LAB
BACTERIA SPEC CULT: NORMAL
SPECIMEN SOURCE: NORMAL

## 2018-08-06 ENCOUNTER — HOSPITAL ENCOUNTER (OUTPATIENT)
Dept: MAMMOGRAPHY | Facility: CLINIC | Age: 53
Discharge: HOME | End: 2018-08-06
Attending: INTERNAL MEDICINE | Admitting: INTERNAL MEDICINE
Payer: COMMERCIAL

## 2018-08-06 DIAGNOSIS — Z12.31 VISIT FOR SCREENING MAMMOGRAM: ICD-10-CM

## 2018-08-06 PROCEDURE — 77067 SCR MAMMO BI INCL CAD: CPT

## 2018-10-20 ENCOUNTER — ALLIED HEALTH/NURSE VISIT (OUTPATIENT)
Dept: NURSING | Facility: CLINIC | Age: 53
End: 2018-10-20
Payer: COMMERCIAL

## 2018-10-20 DIAGNOSIS — Z23 NEED FOR PROPHYLACTIC VACCINATION AND INOCULATION AGAINST INFLUENZA: Primary | ICD-10-CM

## 2018-10-20 PROCEDURE — 90471 IMMUNIZATION ADMIN: CPT

## 2018-10-20 PROCEDURE — 90682 RIV4 VACC RECOMBINANT DNA IM: CPT

## 2018-10-20 NOTE — MR AVS SNAPSHOT
After Visit Summary   10/20/2018    Joycelyn Laird    MRN: [id number]           Patient Information     Date Of Birth          1965        Visit Information        Provider Department      10/20/2018 8:30 AM RV FLU CLINIC NURSE [facility]        Today's Diagnoses     Need for prophylactic vaccination and inoculation against influenza    -  1       Follow-ups after your visit        Your next 10 appointments already scheduled     Dec 03, 2018  9:30 AM CST   Nurse Only with RV MA/LPN   [facility] ([facility])    [address] [place] [address] [phone]              Who to contact     If you have questions or need follow up information about today's clinic visit or your schedule please contact [facility] directly at [phone].  Normal or non-critical lab and imaging results will be communicated to you by Xplianthart, letter or phone within 4 business days after the clinic has received the results. If you do not hear from us within 7 days, please contact the clinic through [employer] or phone. If you have a critical or abnormal lab result, we will notify you by phone as soon as possible.  Submit refill requests through [employer] or call your pharmacy and they will forward the refill request to us. Please allow 3 business days for your refill to be completed.          Additional Information About Your Visit        MyChart Information     [employer] gives you secure access to your electronic health record. If you see a primary care provider, you can also send messages to your care team and make appointments. If you have questions, please call your primary care clinic.  If you do not have a primary care provider, please call [phone] and they will assist you.        Care EveryWhere ID     This is your Care EveryWhere ID. This could be used by other organizations to access your [facility]  records  [id number]        Your Vitals Were     Last Period                   10/29/2011            Blood Pressure from Last 3 Encounters:   07/12/18 128/82   05/30/18 130/82   01/02/18 122/80    Weight from Last 3 Encounters:   07/12/18 206 lb (93.4 kg)   05/30/18 212 lb (96.2 kg)   01/02/18 232 lb 8 oz (105.5 kg)              We Performed the Following     FLU VACCINE, (RIV4) RECOMBINANT HA  , IM (FluBlok, egg free) [03524]- >18 YRS (FMG recommended  50-64 YRS)     Vaccine Administration, Initial [55028]        Primary Care Provider Office Phone # Fax #    Gauri Early [phone]       [address] [place] [address]        Equal Access to Services     [facility] : Hadii van nguyen hadasho Sokevin, waaxda luqadaha, qaybta kaalmada patriciayanawaf, radha garcia . So [facility] [phone].    ATENCIÓN: Si habla español, tiene a alonzo disposición servicios gratuitos de asistencia lingüística. [facility] [phone].    We comply with applicable federal civil rights laws and [place] laws. We do not discriminate on the basis of race, color, national origin, age, disability, sex, sexual orientation, or gender identity.            Thank you!     Thank you for choosing [facility]  for your care. Our goal is always to provide you with excellent care. Hearing back from our patients is one way we can continue to improve our services. Please take a few minutes to complete the written survey that you may receive in the mail after your visit with us. Thank you!             Your Updated Medication List - Protect others around you: Learn how to safely use, store and throw away your medicines at www.disposemymeds.org.          This list is accurate as of 10/20/18  9:16 AM.  Always use your most recent med list.                   Brand Name Dispense Instructions for use Diagnosis    clobetasol 0.05 % cream    TEMOVATE     Apply topically as needed        fish  oil-omega-3 fatty acids 1000 MG capsule     90 capsule    Take 1 g by mouth every other day        Flaxseed Oil 1200 MG Caps      Take by mouth every other day        fluocinonide 0.05 % cream    LIDEX    30 g    Apply small amount to affected area bid as needed    Eczema, unspecified type       MULTIVITAMIN TABS   OR

## 2018-12-03 ENCOUNTER — ALLIED HEALTH/NURSE VISIT (OUTPATIENT)
Dept: NURSING | Facility: CLINIC | Age: 53
End: 2018-12-03
Payer: COMMERCIAL

## 2018-12-03 DIAGNOSIS — Z23 ENCOUNTER FOR IMMUNIZATION: Primary | ICD-10-CM

## 2018-12-03 PROCEDURE — 90750 HZV VACC RECOMBINANT IM: CPT

## 2018-12-03 PROCEDURE — 90471 IMMUNIZATION ADMIN: CPT

## 2018-12-04 ENCOUNTER — TELEPHONE (OUTPATIENT)
Dept: FAMILY MEDICINE | Facility: CLINIC | Age: 53
End: 2018-12-04

## 2018-12-04 NOTE — TELEPHONE ENCOUNTER
Pt calling stating she got the shingles shot yesterday and now today she has 99.8 temp   Pt denies: redness swelling or warmth to the area of the injection   RN advised to take tylenol or ibuprofen for low grade temp and if she develops fever of 103 or higher she should call clinic back     Patient stated an understanding and agreed with plan.    Elisabet Parrish RN, BSN  [facility]

## 2019-03-23 ENCOUNTER — OFFICE VISIT (OUTPATIENT)
Dept: FAMILY MEDICINE | Facility: CLINIC | Age: 54
End: 2019-03-23
Payer: COMMERCIAL

## 2019-03-23 VITALS
BODY MASS INDEX: 35.61 KG/M2 | OXYGEN SATURATION: 97 % | WEIGHT: 235 LBS | SYSTOLIC BLOOD PRESSURE: 126 MMHG | HEIGHT: 68 IN | DIASTOLIC BLOOD PRESSURE: 80 MMHG | TEMPERATURE: 98 F | HEART RATE: 80 BPM

## 2019-03-23 DIAGNOSIS — J06.9 ACUTE URI: Primary | ICD-10-CM

## 2019-03-23 DIAGNOSIS — Z51.81 MEDICATION MONITORING ENCOUNTER: ICD-10-CM

## 2019-03-23 DIAGNOSIS — H40.9 GLAUCOMA OF BOTH EYES, UNSPECIFIED GLAUCOMA TYPE: ICD-10-CM

## 2019-03-23 DIAGNOSIS — E66.01 MORBID OBESITY (H): ICD-10-CM

## 2019-03-23 DIAGNOSIS — J01.90 ACUTE NON-RECURRENT SINUSITIS, UNSPECIFIED LOCATION: ICD-10-CM

## 2019-03-23 PROCEDURE — 99213 OFFICE O/P EST LOW 20 MIN: CPT | Performed by: FAMILY MEDICINE

## 2019-03-23 RX ORDER — AZITHROMYCIN 250 MG/1
TABLET, FILM COATED ORAL
Qty: 6 TABLET | Refills: 0 | Status: SHIPPED | OUTPATIENT
Start: 2019-03-23 | End: 2019-07-18

## 2019-03-23 RX ORDER — LATANOPROST 50 UG/ML
1 SOLUTION/ DROPS OPHTHALMIC DAILY
Start: 2019-03-23

## 2019-03-23 ASSESSMENT — MIFFLIN-ST. JEOR: SCORE: 1729.45

## 2019-03-23 NOTE — PROGRESS NOTES
SUBJECTIVE:   Joycelyn Laird is a 53 year old female who presents to clinic today for the following health issues:    Acute Illness     Acute illness concerns: ear pain- cough    Onset: 3 weeks    Fever: YES- low grade    Chills/Sweats: YES- In the beginning    Headache (location?): no    Sinus Pressure:no    Conjunctivitis:  no    Ear Pain: YES- pressure bilaterally    Rhinorrhea: YES - yellow    Congestion: YES - nasal/chest    Sore Throat: no     Cough: YES-productive of clear sputum    Wheeze: no    Decreased Appetite: no    Nausea: no    Vomiting: no    Diarrhea:  no    Dysuria/Freq.: no    Fatigue/Achiness: YES    Sick/Strep Exposure: YES     Therapies Tried and outcome: nothing    Follow with Dr Early for CPX, will see her soon.    Problem list and histories reviewed & adjusted, as indicated.  Additional history: as documented    Reviewed and updated as needed this visit by clinical staff  Tobacco  Allergies  Meds  Med Hx  Surg Hx  Fam Hx  Soc Hx      Reviewed and updated as needed this visit by Provider       BP Readings from Last 3 Encounters:   03/23/19 126/80   07/12/18 128/82   05/30/18 130/82       body mass index is 35.73 kg/m .    Wt Readings from Last 4 Encounters:   03/23/19 106.6 kg (235 lb)   07/12/18 93.4 kg (206 lb)   05/30/18 96.2 kg (212 lb)   01/02/18 105.5 kg (232 lb 8 oz)       Health Maintenance    Health Maintenance Due   Topic Date Due     PREVENTIVE CARE VISIT  11/02/2018     PHQ-2 Q1 YR  11/02/2018     LIPID MONITORING Q1 YEAR  04/12/2019     PAP Q3 YR  07/18/2019       Current Problem List    Patient Active Problem List   Diagnosis     Primary localized osteoarthrosis, ankle and foot     Fatty liver     Eczema     CARDIOVASCULAR SCREENING; LDL GOAL LESS THAN 130     Morbid obesity (H)     Glaucoma       Past Medical History    Past Medical History:   Diagnosis Date     Acute URI 3/23/2019     CARDIOVASCULAR SCREENING; LDL GOAL LESS THAN 130      Eczema 7/18/2016     Fatty liver       Glaucoma     Dr NICOLA Lofton     Mild or unspecified pre-eclampsia, unspecified as to episode of care     toxemia pregnancy-twins     Primary localized osteoarthrosis, ankle and foot 10/18/2011       Past Surgical History    Past Surgical History:   Procedure Laterality Date      SECTION       - twins     COLONOSCOPY N/A 2017    normal, due 5 yrs     DENTAL SURGERY      wisdom tooth     SURGICAL HISTORY OF -   2018    endometrial biopsy benign - Ob/Gyn - Dr Moore       Current Medications    Current Outpatient Medications   Medication Sig Dispense Refill     azithromycin (ZITHROMAX) 250 MG tablet 2 tabs day 1 and the 1 tab daily for 4 more days 6 tablet 0     fish oil-omega-3 fatty acids (OMEGA 3) 1000 MG capsule Take 1 g by mouth every other day  90 capsule 3     Flaxseed, Linseed, (FLAXSEED OIL) 1200 MG CAPS Take by mouth every other day        latanoprost (XALATAN) 0.005 % ophthalmic solution Place 1 drop into both eyes daily       MULTIVITAMIN TABS   OR        clobetasol (TEMOVATE) 0.05 % cream Apply topically as needed       fluocinonide (LIDEX) 0.05 % cream Apply small amount to affected area bid as needed 30 g 2       Allergies    No Known Allergies    Immunizations    Immunization History   Administered Date(s) Administered     HEPA 2011     Influenza (H1N1) 2010     Influenza (IIV3) PF 10/05/2009, 2010, 10/18/2011, 2012, 10/18/2013, 10/19/2016     Influenza Quad, Recombinant, p-free (RIV4) 10/20/2018     Influenza Vaccine IM 3yrs+ 4 Valent IIV4 2014     Influenza Vaccine, 3 YRS +, IM (QUADRIVALENT W/PRESERVATIVES) 2015, 10/01/2017     TD (ADULT, 7+) 2006     TDAP Vaccine (Adacel) 2013     Zoster vaccine recombinant adjuvanted (SHINGRIX) 2018, 2018       Family History    Family History   Problem Relation Age of Onset     Lipids Mother      Hypertension Mother      Lipids Father      Hypertension Father       REYMUNDO Father 68     Prostate Cancer Father         Prostrate removed     Peripheral Vascular Disease Father      Colon Cancer Brother         Preventative - suspicious cells     Coronary Artery Disease Paternal Grandfather        Social History    Social History     Socioeconomic History     Marital status:      Spouse name: Phu     Number of children: 2     Years of education: 16     Highest education level: Not on file   Occupational History     Employer: NONE    Social Needs     Financial resource strain: Not on file     Food insecurity:     Worry: Not on file     Inability: Not on file     Transportation needs:     Medical: Not on file     Non-medical: Not on file   Tobacco Use     Smoking status: Never Smoker     Smokeless tobacco: Never Used   Substance and Sexual Activity     Alcohol use: Yes     Alcohol/week: 0.0 - 0.6 oz     Drug use: No     Sexual activity: Yes     Partners: Male     Birth control/protection: Post-menopausal   Lifestyle     Physical activity:     Days per week: Not on file     Minutes per session: Not on file     Stress: Not on file   Relationships     Social connections:     Talks on phone: Not on file     Gets together: Not on file     Attends Gnosticist service: Not on file     Active member of club or organization: Not on file     Attends meetings of clubs or organizations: Not on file     Relationship status: Not on file     Intimate partner violence:     Fear of current or ex partner: Not on file     Emotionally abused: Not on file     Physically abused: Not on file     Forced sexual activity: Not on file   Other Topics Concern      Service Not Asked     Blood Transfusions Not Asked     Caffeine Concern Not Asked     Occupational Exposure Not Asked     Hobby Hazards Not Asked     Sleep Concern Not Asked     Stress Concern Not Asked     Weight Concern Not Asked     Special Diet Not Asked     Back Care Not Asked     Exercise No     Bike Helmet Not Asked     Seat  "Belt Yes     Self-Exams Yes     Comment: Occaisionally     Parent/sibling w/ CABG, MI or angioplasty before 65F 55M? No   Social History Narrative     Not on file       All above reviewed and updated, all stable unless otherwise noted    Recent labs reviewed    ROS:  CONSTITUTIONAL: NEGATIVE for fever, chills, change in weight  INTEGUMENTARY/SKIN: NEGATIVE for worrisome rashes, moles or lesions  EYES: NEGATIVE for vision changes or irritation  ENT/MOUTH: NEGATIVE for ear, mouth and throat problems  RESP: NEGATIVE for significant cough or SOB  CV: NEGATIVE for chest pain, palpitations or peripheral edema  GI: NEGATIVE for nausea, abdominal pain, heartburn, or change in bowel habits  : NEGATIVE for frequency, dysuria, or hematuria  MUSCULOSKELETAL: NEGATIVE for significant arthralgias or myalgia  NEURO: NEGATIVE for weakness, dizziness or paresthesias  ENDOCRINE: NEGATIVE for temperature intolerance, skin/hair changes  HEME: NEGATIVE for bleeding problems  PSYCHIATRIC: NEGATIVE for changes in mood or affect    OBJECTIVE:                                                    /80   Pulse 80   Temp 98  F (36.7  C) (Oral)   Ht 1.727 m (5' 8\")   Wt 106.6 kg (235 lb)   LMP 10/29/2011   SpO2 97%   BMI 35.73 kg/m    Body mass index is 35.73 kg/m .  GENERAL: healthy, alert and no distress  EYES: Eyes grossly normal to inspection, extraocular movements - intact, and PERRL  HENT: ear canals- normal; TMs- normal; Nose- normal; Mouth- no ulcers, no lesions  NECK: no tenderness, no adenopathy, no asymmetry, no masses, no stiffness; thyroid- normal to palpation  RESP: lungs clear to auscultation - no rales, no rhonchi, no wheezes  CV: regular rates and rhythm, normal S1 S2, no S3 or S4 and no murmur, no click or rub -  ABDOMEN: soft, no tenderness, no  hepatosplenomegaly, no masses, normal bowel sounds  MS: extremities- no gross deformities noted, no edema  SKIN: no suspicious lesions, no rashes  NEURO: strength and " tone- normal, sensory exam- grossly normal, mentation- intact, speech- normal, reflexes- symmetric  BACK: no CVA tenderness, no paralumbar tenderness  PSYCH: Alert and oriented times 3; speech- coherent , normal rate and volume; able to articulate logical thoughts, able to abstract reason, no tangential thoughts, no hallucinations or delusions, affect- normal  LYMPHATICS: no cervical adenopathy    DIAGNOSTICS/PROCEDURES:                                                      none      ASSESSMENT/PLAN:                                                        ICD-10-CM    1. Acute URI J06.9 azithromycin (ZITHROMAX) 250 MG tablet   2. Acute non-recurrent sinusitis, unspecified location J01.90 azithromycin (ZITHROMAX) 250 MG tablet   3. Glaucoma of both eyes, unspecified glaucoma type H40.9 latanoprost (XALATAN) 0.005 % ophthalmic solution   4. Morbid obesity (H) E66.01    5. Medication monitoring encounter Z51.81        Discussed treatment/modality options, including risk and benefits she desires:    Zithromax.  Symptomatic cares.  mucinex DM.   CPX with Dr Early soon    All diagnosis above reviewed and noted above, otherwise stable.  See [facility] orders for further details.     Return in about 3 months (around 6/23/2019), or if symptoms worsen or fail to improve, for Complete Physical.    Health Maintenance Due   Topic Date Due     PREVENTIVE CARE VISIT  11/02/2018     PHQ-2 Q1 YR  11/02/2018     LIPID MONITORING Q1 YEAR  04/12/2019     PAP Q3 YR  07/18/2019       See Patient Instructions           Paulo Arriola MD [address]  [address] [phone] Fax

## 2019-07-18 ENCOUNTER — OFFICE VISIT (OUTPATIENT)
Dept: INTERNAL MEDICINE | Facility: CLINIC | Age: 54
End: 2019-07-18
Payer: COMMERCIAL

## 2019-07-18 ENCOUNTER — OFFICE VISIT (OUTPATIENT)
Dept: PODIATRY | Facility: CLINIC | Age: 54
End: 2019-07-18
Payer: COMMERCIAL

## 2019-07-18 VITALS
SYSTOLIC BLOOD PRESSURE: 128 MMHG | DIASTOLIC BLOOD PRESSURE: 82 MMHG | WEIGHT: 234 LBS | HEIGHT: 68 IN | BODY MASS INDEX: 35.46 KG/M2

## 2019-07-18 VITALS
HEART RATE: 78 BPM | WEIGHT: 234.2 LBS | BODY MASS INDEX: 35.61 KG/M2 | OXYGEN SATURATION: 96 % | SYSTOLIC BLOOD PRESSURE: 128 MMHG | TEMPERATURE: 98.4 F | DIASTOLIC BLOOD PRESSURE: 82 MMHG

## 2019-07-18 DIAGNOSIS — S99.922A INJURY OF TOENAIL OF LEFT FOOT, INITIAL ENCOUNTER: Primary | ICD-10-CM

## 2019-07-18 DIAGNOSIS — L60.1 ONYCHOLYSIS: Primary | ICD-10-CM

## 2019-07-18 DIAGNOSIS — S90.122A CONTUSION OF SECOND TOE OF LEFT FOOT, INITIAL ENCOUNTER: ICD-10-CM

## 2019-07-18 PROCEDURE — 99243 OFF/OP CNSLTJ NEW/EST LOW 30: CPT | Performed by: PODIATRIST

## 2019-07-18 PROCEDURE — 99213 OFFICE O/P EST LOW 20 MIN: CPT | Performed by: INTERNAL MEDICINE

## 2019-07-18 ASSESSMENT — MIFFLIN-ST. JEOR: SCORE: 1724.92

## 2019-07-18 NOTE — Clinical Note
Harley Moyer saw Joycelyn for the loose left 2nd toenail after her injury. The nail was simply peeled off.  She was given post-procedure instructions and will follow up prn.Ila

## 2019-07-18 NOTE — PATIENT INSTRUCTIONS
Thank you for choosing [facility]!    DR. COWART'S CLINIC LOCATIONS:   MONDAY - [place] TUESDAY - [place]   [address] [facility]  [address]   [place] [address] [place] [address]   [phone]       THURSDAY AM - [place] THURSDAY PM - [place]   [address]   [place] [address] [place] [address]   [phone]       FRIDAY AM - [place] SET UP SURGERY: [phone] [address] APPOINTMENTS: [phone]   [place] [address] BILLING QUESTIONS: [phone] FAX NUMBER: [phone]     Follow Up:  As needed          FYI: The following information is included in the after visit summary for all patients:  Body weight can be a sensitive issue to discuss in clinic, but we think the following information is very important. Although we focus on the feet and ankles, we do support the overall health of our patients. Many things can cause foot and ankle problems. Foot structure, activity level, foot mechanics and injuries are common causes of pain. One very important issue that often goes unmentioned, is body weight. Extra weight can cause increased stress on muscles, ligaments, bones and tendons. Sometimes just a few extra pounds is all it takes to put one over her/his threshold. Without reducing that stress, it can be difficult to alleviate pain. As Foot & Ankle specialists, our job is addressing the lower extremity problem and possible causes. Regarding extra body weight, we encourage patients to discuss diet and weight management plans with their primary care doctors. It is this team approach that gives you the best opportunity for pain relief and getting you back on your feet. [facility] has a Comprehensive Weight Management Program. This program includes counseling, education, non-surgical and surgical approaches to weight loss. If you are interested in learning more either talk to  you primary care provider or call [phone].

## 2019-07-18 NOTE — PROGRESS NOTES
"Foot & Ankle Surgery  2019    CC: \"toenail removal/questions about nail fungus\"    I was asked to see Joycelyn Laird regarding the chief complaint by:  Dr. NURA Avalos    HPI:  Pt is a 53 year old female who presents with above complaint.  Injury left 2nd toenail, bleeding and loose. \"occasional sensitivity\", pain 2/10 \"occasional\", worse with \"then ail being moved\".  Has \"stabilized it when sleeping\", which has helped.  She was seen by her PCP Dr Avalos, who referred her to us for treatment.      ROS:   Pos for CC.  The patient denies current nausea, vomiting, chills, fevers, belly pain, calf pain, chest pain or SOB.  Complete remainder of ROS is otherwise neg.    VITALS:    Vitals:    19 0918   BP: 128/82   Weight: 106.1 kg (234 lb)   Height: 1.727 m (5' 8\")       PMH:    Past Medical History:   Diagnosis Date     Acute URI 3/23/2019     CARDIOVASCULAR SCREENING; LDL GOAL LESS THAN 130      Eczema 2016     Fatty liver 2013     Glaucoma     Dr NICOLA Lofton     Mild or unspecified pre-eclampsia, unspecified as to episode of care     toxemia pregnancy-twins     Primary localized osteoarthrosis, ankle and foot 10/18/2011       SXHX:    Past Surgical History:   Procedure Laterality Date      SECTION       - twins     COLONOSCOPY N/A 2017    normal, due 5 yrs     DENTAL SURGERY  1998    wisdom tooth     SURGICAL HISTORY OF -   2018    endometrial biopsy benign - Ob/Gyn - Dr Moore        MEDS:    Current Outpatient Medications   Medication     clobetasol (TEMOVATE) 0.05 % cream     fish oil-omega-3 fatty acids (OMEGA 3) 1000 MG capsule     Flaxseed, Linseed, (FLAXSEED OIL) 1200 MG CAPS     fluocinonide (LIDEX) 0.05 % cream     latanoprost (XALATAN) 0.005 % ophthalmic solution     MULTIVITAMIN TABS   OR     No current facility-administered medications for this visit.        ALL:   No Known Allergies    FMH:    Family History   Problem Relation Age of Onset     Lipids " Mother      Hypertension Mother      Lipids Father      Hypertension Father      C.A.D. Father 68     Prostate Cancer Father         Prostrate removed     Peripheral Vascular Disease Father      Colon Cancer Brother         Preventative - suspicious cells     Coronary Artery Disease Paternal Grandfather        SocHx:    Social History     Socioeconomic History     Marital status:      Spouse name: Phu     Number of children: 2     Years of education: 16     Highest education level: Not on file   Occupational History     Employer: NONE    Social Needs     Financial resource strain: Not on file     Food insecurity:     Worry: Not on file     Inability: Not on file     Transportation needs:     Medical: Not on file     Non-medical: Not on file   Tobacco Use     Smoking status: Never Smoker     Smokeless tobacco: Never Used   Substance and Sexual Activity     Alcohol use: Yes     Alcohol/week: 0.0 - 0.6 oz     Drug use: No     Sexual activity: Yes     Partners: Male     Birth control/protection: Post-menopausal   Lifestyle     Physical activity:     Days per week: Not on file     Minutes per session: Not on file     Stress: Not on file   Relationships     Social connections:     Talks on phone: Not on file     Gets together: Not on file     Attends Congregation service: Not on file     Active member of club or organization: Not on file     Attends meetings of clubs or organizations: Not on file     Relationship status: Not on file     Intimate partner violence:     Fear of current or ex partner: Not on file     Emotionally abused: Not on file     Physically abused: Not on file     Forced sexual activity: Not on file   Other Topics Concern      Service Not Asked     Blood Transfusions Not Asked     Caffeine Concern Not Asked     Occupational Exposure Not Asked     Hobby Hazards Not Asked     Sleep Concern Not Asked     Stress Concern Not Asked     Weight Concern Not Asked     Special Diet Not Asked     Back  Care Not Asked     Exercise No     Bike Helmet Not Asked     Seat Belt Yes     Self-Exams Yes     Comment: Occaisionally     Parent/sibling w/ CABG, MI or angioplasty before 65F 55M? No   Social History Narrative     Not on file           EXAMINATION:  Gen:   No apparent distress  Neuro:   A&Ox3, no deficits  Psych:    Answering questions appropriately for age and situation with normal affect  Head:    NCAT  Eye:    Visual scanning without deficit  Ear:    Response to auditory stimuli wnl  Lung:    Non-labored breathing on RA noted  Abd:    NTND per patient report  Lymph:    Neg for pitting/non-pitting edema BLE  Vasc:    Pulses palpable, CFT minimally delayed  Neuro:    Light touch sensation intact to all sensory nerve distributions without paresthesias  Derm:    L 2nd nail is very loose with minimal soft tissue attachments.  After removal, the nail bed was noted to be healthy without necrosis or SOI.  No clear onychomycosis  MSK:    ROM, strength wnl without limitation, no pain on palpation noted.  Calf:    Neg for redness, swelling or tenderness    Assessment:  53 year old female with traumatic onycholysis left 2nd toenail      Plan:  Discussed etiologies, anatomy and options  1.  Traumatic onycholysis left 2nd toenail  -the nail was simply pulled off.  No anesthesia needed.  No charge  -daily cares - wash/dry, antibiotic ointment and bandaid to the left 2nd nail bed until healed.  We discussed that I expect this to take 1-2 weeks to heal.   -no indication for PO abx course    Follow up:  prn or sooner with acute issues      Patient's medical history was reviewed today    Body mass index is 35.58 kg/m .  Weight management plan: Patient was referred to their PCP to discuss a diet and exercise plan.        Talha Harmon DPM FACFAS FACFAOM  Podiatric Foot & Ankle Surgeon  [facility]  [phone]

## 2019-07-18 NOTE — PROGRESS NOTES
Subjective     Joycelyn Laird is a 53 year old female who presents to clinic today for the following health issues:    HPI     Patient normally seen at [facility] but unable to attain a timely appt.     Patient states that she has had long-standing issues with fungus under her toenail and states that she is not exactly sure what happened but she may have caught the toenail on something while she was wearing her flip-flops and pulled the nail back.  She called in to be seen to have somebody remove the toenail.    Toenail injury       Duration: Yesterday     Description (location/character/radiation): left foot second toe     Intensity:  mild    Accompanying signs and symptoms: Toenail fungus     History (similar episodes/previous evaluation): Patient was gardening yesterday and bumped foot, toenail now loose and lifting     Precipitating or alleviating factors: None    Therapies tried and outcome: None       Patient Active Problem List   Diagnosis     Primary localized osteoarthrosis, ankle and foot     Fatty liver     Eczema     CARDIOVASCULAR SCREENING; LDL GOAL LESS THAN 130     Morbid obesity (H)     Glaucoma     Past Surgical History:   Procedure Laterality Date      SECTION       - twins     COLONOSCOPY N/A 2017    normal, due 5 yrs     DENTAL SURGERY  1998    wisdom tooth     SURGICAL HISTORY OF -   2018    endometrial biopsy benign - Ob/Gyn - Dr Moore       Social History     Tobacco Use     Smoking status: Never Smoker     Smokeless tobacco: Never Used   Substance Use Topics     Alcohol use: Yes     Alcohol/week: 0.0 - 0.6 oz     Family History   Problem Relation Age of Onset     Lipids Mother      Hypertension Mother      Lipids Father      Hypertension Father      C.A.D. Father 68     Prostate Cancer Father         Prostrate removed     Peripheral Vascular Disease Father      Colon Cancer Brother         Preventative - suspicious cells     Coronary Artery Disease  Paternal Grandfather          Current Outpatient Medications   Medication Sig Dispense Refill     clobetasol (TEMOVATE) 0.05 % cream Apply topically as needed       fish oil-omega-3 fatty acids (OMEGA 3) 1000 MG capsule Take 1 g by mouth every other day  90 capsule 3     Flaxseed, Linseed, (FLAXSEED OIL) 1200 MG CAPS Take by mouth every other day        fluocinonide (LIDEX) 0.05 % cream Apply small amount to affected area bid as needed 30 g 2     latanoprost (XALATAN) 0.005 % ophthalmic solution Place 1 drop into both eyes daily       MULTIVITAMIN TABS   OR        No Known Allergies  BP Readings from Last 3 Encounters:   03/23/19 126/80   07/12/18 128/82   05/30/18 130/82    Wt Readings from Last 3 Encounters:   03/23/19 106.6 kg (235 lb)   07/12/18 93.4 kg (206 lb)   05/30/18 96.2 kg (212 lb)         Reviewed and updated as needed this visit by Provider         Review of Systems   ROS COMP: CONSTITUTIONAL: NEGATIVE for fever, chills, change in weight  ENT/MOUTH: NEGATIVE for ear, mouth and throat problems  RESP: NEGATIVE for significant cough or SOB  CV: NEGATIVE for chest pain, palpitations or peripheral edema  GI: NEGATIVE for nausea, abdominal pain, heartburn, or change in bowel habits  : NEGATIVE for frequency, dysuria, or hematuria  NEURO: NEGATIVE for weakness, dizziness or paresthesias  HEME: NEGATIVE for bleeding problems  PSYCHIATRIC: NEGATIVE for changes in mood or affect      Objective    /82   Pulse 78   Temp 98.4  F (36.9  C) (Oral)   Wt 106.2 kg (234 lb 3.2 oz)   [facility] 10/29/2011   SpO2 96%   BMI 35.61 kg/m    Body mass index is 35.61 kg/m .  Physical Exam   GENERAL: healthy, alert and no distress  EYES: Eyes grossly normal to inspection, PERRL and conjunctivae and sclerae normal  HENT: ear canals and TM's normal, nose and mouth without ulcers or lesions  NECK: no adenopathy, no asymmetry, masses, or scars and thyroid normal to palpation  RESP: lungs clear to auscultation - no rales,  "rhonchi or wheezes  CV: regular rate and rhythm, normal S1 S2, no S3 or S4, no click or rub, no peripheral edema and peripheral pulses strong  MS: The left foot second toe nail is loose roughly 75% of the nailbed.  There is no distinct cellulitic change noted.    NEURO: No focal changes  PSYCH: mentation appears normal, affect normal/bright        Assessment & Plan     1. Injury of toenail of left foot, initial encounter  I informed the patient that we are unable to remove that toenail here in the internal medicine clinic and that she would likely have benefited more from seeing or being scheduled in the podiatry clinic.  I had my nursing staff look at the available podiatry schedules and we will try and work the patient in to be seen today to get the toenail removed as it appears that she may require a little bit of local anesthesia to treat this successfully.       BMI:   Estimated body mass index is 35.73 kg/m  as calculated from the following:    Height as of 3/23/19: 1.727 m (5' 8\").    Weight as of 3/23/19: 106.6 kg (235 lb).     See Patient Instructions    Return for follow-up Podiatry.    Steven Avalos MD  [facility]      "

## 2019-08-30 ENCOUNTER — OFFICE VISIT (OUTPATIENT)
Dept: FAMILY MEDICINE | Facility: CLINIC | Age: 54
End: 2019-08-30
Payer: COMMERCIAL

## 2019-08-30 VITALS
BODY MASS INDEX: 34.86 KG/M2 | OXYGEN SATURATION: 98 % | DIASTOLIC BLOOD PRESSURE: 80 MMHG | HEIGHT: 68 IN | HEART RATE: 85 BPM | WEIGHT: 230 LBS | TEMPERATURE: 98.3 F | SYSTOLIC BLOOD PRESSURE: 126 MMHG

## 2019-08-30 DIAGNOSIS — J06.9 UPPER RESPIRATORY TRACT INFECTION, UNSPECIFIED TYPE: Primary | ICD-10-CM

## 2019-08-30 PROCEDURE — 99213 OFFICE O/P EST LOW 20 MIN: CPT | Performed by: FAMILY MEDICINE

## 2019-08-30 ASSESSMENT — MIFFLIN-ST. JEOR: SCORE: 1701.77

## 2019-08-30 NOTE — PROGRESS NOTES
"Subjective     Joycelyn Laird is a 54 year old female who presents to clinic today for the following health issues:    HPI   Acute Illness   Acute illness concerns: Cough  Onset: x 7 days    Fever: has not checked    Chills/Sweats: YES    Headache (location?): no    Sinus Pressure:no    Conjunctivitis:  no    Ear Pain: YES-     Rhinorrhea: YES    Congestion: YES    Sore Throat: no     Cough: YES- barky    Wheeze: no    Decreased Appetite: no    Nausea: no    Vomiting: no    Diarrhea:  1 day    Dysuria/Freq.: no    Fatigue/Achiness: YES    Sick/Strep Exposure: YES     Therapies Tried and outcome: nothing    Reviewed and updated as needed this visit by provider:  Tobacco  Allergies  Meds  Problems  Med Hx  Surg Hx  Fam Hx         Review of Systems   Constitutional, HEENT, cardiovascular, pulmonary, GI, , musculoskeletal, neuro, skin, endocrine and psych systems are negative, except as otherwise noted.      Objective   /80   Pulse 85   Temp 98.3  F (36.8  C) (Oral)   Ht 1.727 m (5' 8\")   Wt 104.3 kg (230 lb)   LMP 10/29/2011   SpO2 98%   BMI 34.97 kg/m   Body mass index is 34.97 kg/m .  Physical Exam   GENERAL: no apparent distress  EYES: Conjunctiva are not injected, no discharge.  EARS: Left TM -no erythema, no effusion,  not bulged.               Right TM -no erythema, no effusion,  not bulged.  NOSE: no discharge, no sinus tenderness  THROAT: no erythema, no exudate, no lesions  NECK: supple, no adenopathy.  CARDIAC: regular rate and rhythm, no murmur  RESP: clear, no wheezing, no rales, no rhonchi  ABD: soft, no distension, no tenderness  SKIN: No rashes        Assessment & Plan     Joycelyn was seen today for cough.    Diagnoses and all orders for this visit:    Upper respiratory tract infection, unspecified type      Consider antibiotic course if cough persists for another 10-14  Days    Symptomatic cares and fever control(if indicated) discussed.  Risks and benefits of meds " "discussed.      BMI:   Estimated body mass index is 35.58 kg/m  as calculated from the following:    Height as of 7/18/19: 1.727 m (5' 8\").    Weight as of 7/18/19: 106.1 kg (234 lb).   Weight management plan: Discussed healthy diet and exercise guidelines        See Patient Instructions    Return in about 1 week (around 9/6/2019), or if symptoms worsen or fail to improve, for recheck.          Tacos Nguyen MD   Pager - [phone]  [facility]  "

## 2019-09-05 ENCOUNTER — TELEPHONE (OUTPATIENT)
Dept: FAMILY MEDICINE | Facility: CLINIC | Age: 54
End: 2019-09-05

## 2019-09-05 DIAGNOSIS — J20.9 ACUTE BRONCHITIS, UNSPECIFIED ORGANISM: Primary | ICD-10-CM

## 2019-09-05 RX ORDER — AZITHROMYCIN 250 MG/1
TABLET, FILM COATED ORAL
Qty: 6 TABLET | Refills: 0 | Status: SHIPPED | OUTPATIENT
Start: 2019-09-05 | End: 2020-01-16

## 2019-09-05 NOTE — TELEPHONE ENCOUNTER
Patient was seen on 08/30/2019 for URI  OV Notes:   Consider antibiotic course if cough persists for another 10-14 Days    Called patient @ # below -   Patient stated that her symptoms have not gotten any better, and the cough has worsened.   Stated that the cough kept her up last night, cough is nonproductive/barky. Occasionally patient will cough up yellowish sputum.   Also having occasional bilateral ear pressure (more on R side), sweats    Therapies Tried: humidifier, increase in fluids - no improvement     DENIES: fevers, chills, HA, CP, SOB, Difficulty Breathing wheezing, sore throat, congestion    Pharmacy:  [facility]      Routing to PCP for further review/recommendations/orders.      Oliva Antoine RN  [place] [facility]

## 2019-09-05 NOTE — TELEPHONE ENCOUNTER
Reason for call:  Patient reporting a symptom    Symptom or request: Cough    Duration (how long have symptoms been present): 2 weeks    Have you been treated for this before? Yes    Additional comments: Coughing all night last night & Dr Nguyen told her to call back if this didn't get better.    Phone Number patient can be reached at:  Home number on file [phone] (home)    Best Time:  any    Can we leave a detailed message on this number:  YES    Call taken on 9/5/2019 at 11:09 AM by Mar Martinez

## 2019-09-06 ASSESSMENT — ENCOUNTER SYMPTOMS
CONSTIPATION: 0
EYE PAIN: 0
COUGH: 1
NERVOUS/ANXIOUS: 0
SORE THROAT: 0
MYALGIAS: 0
WEAKNESS: 0
JOINT SWELLING: 0
ABDOMINAL PAIN: 0
PARESTHESIAS: 0
HEMATOCHEZIA: 0
FEVER: 0
ARTHRALGIAS: 0
SHORTNESS OF BREATH: 0
PALPITATIONS: 0
HEADACHES: 0
CHILLS: 0
HEMATURIA: 0
HEARTBURN: 0
DIARRHEA: 0
BREAST MASS: 0
DIZZINESS: 0
FREQUENCY: 0
NAUSEA: 0
DYSURIA: 0

## 2019-09-06 NOTE — TELEPHONE ENCOUNTER
Called patient @ # below - left detailed VM with MD TERESO message below    Oliva Antoine RN  [place] [facility]

## 2019-09-09 ENCOUNTER — OFFICE VISIT (OUTPATIENT)
Dept: INTERNAL MEDICINE | Facility: CLINIC | Age: 54
End: 2019-09-09
Payer: COMMERCIAL

## 2019-09-09 VITALS
WEIGHT: 231.6 LBS | HEART RATE: 77 BPM | HEIGHT: 68 IN | SYSTOLIC BLOOD PRESSURE: 139 MMHG | OXYGEN SATURATION: 98 % | DIASTOLIC BLOOD PRESSURE: 88 MMHG | RESPIRATION RATE: 18 BRPM | BODY MASS INDEX: 35.1 KG/M2 | TEMPERATURE: 98 F

## 2019-09-09 DIAGNOSIS — Z13.6 CARDIOVASCULAR SCREENING; LDL GOAL LESS THAN 130: ICD-10-CM

## 2019-09-09 DIAGNOSIS — Z00.00 ROUTINE GENERAL MEDICAL EXAMINATION AT A HEALTH CARE FACILITY: Primary | ICD-10-CM

## 2019-09-09 DIAGNOSIS — Z12.4 SCREENING FOR MALIGNANT NEOPLASM OF CERVIX: ICD-10-CM

## 2019-09-09 DIAGNOSIS — R21 RASH: ICD-10-CM

## 2019-09-09 DIAGNOSIS — K76.0 FATTY LIVER: ICD-10-CM

## 2019-09-09 PROCEDURE — 36415 COLL VENOUS BLD VENIPUNCTURE: CPT | Performed by: INTERNAL MEDICINE

## 2019-09-09 PROCEDURE — 80061 LIPID PANEL: CPT | Performed by: INTERNAL MEDICINE

## 2019-09-09 PROCEDURE — 99396 PREV VISIT EST AGE 40-64: CPT | Performed by: INTERNAL MEDICINE

## 2019-09-09 PROCEDURE — G0145 SCR C/V CYTO,THINLAYER,RESCR: HCPCS | Performed by: INTERNAL MEDICINE

## 2019-09-09 PROCEDURE — 80053 COMPREHEN METABOLIC PANEL: CPT | Performed by: INTERNAL MEDICINE

## 2019-09-09 PROCEDURE — 87624 HPV HI-RISK TYP POOLED RSLT: CPT | Performed by: INTERNAL MEDICINE

## 2019-09-09 RX ORDER — CALCIUM CARBONATE 500(1250)
1 TABLET ORAL 2 TIMES DAILY
COMMUNITY
End: 2024-04-10

## 2019-09-09 ASSESSMENT — ENCOUNTER SYMPTOMS
NERVOUS/ANXIOUS: 0
HEARTBURN: 0
JOINT SWELLING: 0
CONSTIPATION: 0
WEAKNESS: 0
SHORTNESS OF BREATH: 0
PARESTHESIAS: 0
MYALGIAS: 0
ABDOMINAL PAIN: 0
HEADACHES: 0
HEMATOCHEZIA: 0
DIZZINESS: 0
PALPITATIONS: 0
FREQUENCY: 0
EYE PAIN: 0
CHILLS: 0
HEMATURIA: 0
COUGH: 1
SORE THROAT: 0
BREAST MASS: 0
ARTHRALGIAS: 0
DIARRHEA: 0
NAUSEA: 0
DYSURIA: 0
FEVER: 0

## 2019-09-09 ASSESSMENT — MIFFLIN-ST. JEOR: SCORE: 1709.03

## 2019-09-09 NOTE — PROGRESS NOTES
SUBJECTIVE:   CC: Joycelyn Laird is an 54 year old woman who presents for preventive health visit.   Patient is here for a physical.  Healthy Habits:     Getting at least 3 servings of Calcium per day:  Yes    Bi-annual eye exam:  Yes    Dental care twice a year:  Yes    Sleep apnea or symptoms of sleep apnea:  None    Diet:  Regular (no restrictions)    Frequency of exercise:  2-3 days/week    Duration of exercise:  15-30 minutes    Taking medications regularly:  Yes    Medication side effects:  None    PHQ-2 Total Score: 0    Additional concerns today:  Yes    Current concerns:   1. Cough: She was seen in urgent care a few weeks ago, continue to cause a cold in 4 days ago and started on Z-Brian.  It is improving.  2.  She feels a little pressure near her TMJs.  This started about a week ago, seems related to the cough.  May be a little better, she did think she felt some lymph nodes at her upper neck.  3.  She has a new rash on her left buttock for about 3 days.  It is a little bit itchy.             Today's PHQ-2 Score:   PHQ-2 ( 1999 [employer]) 9/6/2019   Q1: Little interest or pleasure in doing things 0   Q2: Feeling down, depressed or hopeless 0   PHQ-2 Score 0   Q1: Little interest or pleasure in doing things Not at all   Q2: Feeling down, depressed or hopeless Not at all   PHQ-2 Score 0       Abuse: Current or Past(Physical, Sexual or Emotional)- No  Do you feel safe in your environment? Yes    Social History     Tobacco Use     Smoking status: Never Smoker     Smokeless tobacco: Never Used   Substance Use Topics     Alcohol use: Yes     Alcohol/week: 0.0 - 0.6 oz         Alcohol Use 9/6/2019   Prescreen: >3 drinks/day or >7 drinks/week? No   Prescreen: >3 drinks/day or >7 drinks/week? -       Reviewed orders with patient.  Reviewed health maintenance and updated orders accordingly - Yes      Mammogram Screening: Patient over age 50, mutual decision to screen reflected in health maintenance.    Pertinent  mammograms are reviewed under the imaging tab.  History of abnormal Pap smear: NO - age 30-65 PAP every 5 years with negative HPV co-testing recommended  PAP / HPV 7/18/2016 3/29/2013 8/21/2009   PAP NIL NIL NIL     Reviewed and updated as needed this visit by clinical staff  Allergies         Reviewed and updated as needed this visit by Provider        Patient Active Problem List   Diagnosis     Fatty liver     Eczema     CARDIOVASCULAR SCREENING; LDL GOAL LESS THAN 130     Morbid obesity (H)     Glaucoma     Current Outpatient Medications   Medication Sig Dispense Refill     azithromycin (ZITHROMAX) 250 MG tablet Two tablets first day, then one tablet daily for four days 6 tablet 0     calcium carbonate (OS-RAINER) 500 MG tablet Take 1 tablet by mouth 2 times daily       clobetasol (TEMOVATE) 0.05 % cream Apply topically as needed       fish oil-omega-3 fatty acids (OMEGA 3) 1000 MG capsule Take 1 g by mouth every other day  90 capsule 3     Flaxseed, Linseed, (FLAXSEED OIL) 1200 MG CAPS Take by mouth every other day        fluocinonide (LIDEX) 0.05 % cream Apply small amount to affected area bid as needed 30 g 2     latanoprost (XALATAN) 0.005 % ophthalmic solution Place 1 drop into both eyes daily       MULTIVITAMIN TABS   OR           Review of Systems   Constitutional: Negative for chills and fever.   HENT: Positive for ear pain and hearing loss. Negative for congestion and sore throat.    Eyes: Negative for pain and visual disturbance.   Respiratory: Positive for cough. Negative for shortness of breath.    Cardiovascular: Negative for chest pain, palpitations and peripheral edema.   Gastrointestinal: Negative for abdominal pain, constipation, diarrhea, heartburn, hematochezia and nausea.   Breasts:  Negative for tenderness, breast mass and discharge.   Genitourinary: Negative for dysuria, frequency, genital sores, hematuria, pelvic pain, urgency, vaginal bleeding and vaginal discharge.   Musculoskeletal:  "Negative for arthralgias, joint swelling and myalgias.   Skin: Negative for rash.   Neurological: Negative for dizziness, weakness, headaches and paresthesias.   Psychiatric/Behavioral: Negative for mood changes. The patient is not nervous/anxious.      She does not really have ear pain, it is pressure in front of the ears.  Her hearing is decreased when there is loud background noises, no problems when is quiet.       OBJECTIVE:   LMP 10/29/2011   Physical Exam      Objective:  Patient alert, in no acute distress  /88   Pulse 77   Temp 98  F (36.7  C) (Oral)   Resp 18   Ht 1.727 m (5' 8\")   Wt 105.1 kg (231 lb 9.6 oz)   LMP 10/29/2011   SpO2 98%   BMI 35.21 kg/m      HEENT: extraocular movements are intact, pupils equal and reactive to light and accommodation, TMs clear, oropharynx clear  NECK: Neck supple. No adenopathy. Thyroid symmetric, normal size,, Carotids without bruits.  PULMONARY: clear to auscultation  CARDIAC: regular rate and rhythm and no murmurs, clicks, or gallops  PULSES: 2/2 throughout  BACK: no spinal or CVAT  ABDOMINAL: Soft, nontender.  Normal bowel sounds.  No hepatosplenomegaly or abnormal masses  BREAST: No breast masses or tenderness, No axillary masses or tenderness and No galactorrhea  PELVIC: external genitalia normal, vaginal mucosa normal, cervix normal, specimen sent for pap, bimanual exam with normal uterus and adnexa,   REFLEXES: 2+ throughout  SKIN: There is a small patch of red papules on the left buttock, a few scattered at the upper thigh, there are couple papules on the right leg and one on the buttock.  No vesicles.           ASSESSMENT/PLAN:   1. Routine general medical examination at a health care facility  Up to date  - Comprehensive metabolic panel (BMP + Alb, Alk Phos, ALT, AST, Total. Bili, TP)  - Lipid panel reflex to direct LDL Fasting    2. Rash  Unclear etiology, does not appear to be shingles because it is not blistering, has a few papules on both " "sides, it could potentially be bedbugs but not typical location, possible contact dermatitis, not likely to be a drug reaction.  Trial of hydrocortisone    3. Fatty liver  recheck  - Comprehensive metabolic panel (BMP + Alb, Alk Phos, ALT, AST, Total. Bili, TP)    4. CARDIOVASCULAR SCREENING; LDL GOAL LESS THAN 130    - Lipid panel reflex to direct LDL Fasting    5. Screening for malignant neoplasm of cervix    - Pap imaged thin layer screen with HPV - recommended age 30 - 65 years (select HPV order below)  - HPV High Risk Types DNA Cervical    COUNSELING:  Reviewed preventive health counseling, as reflected in patient instructions    Estimated body mass index is 34.97 kg/m  as calculated from the following:    Height as of 8/30/19: 1.727 m (5' 8\").    Weight as of 8/30/19: 104.3 kg (230 lb).    Weight management plan: Discussed healthy diet and exercise guidelines     reports that she has never smoked. She has never used smokeless tobacco.      Counseling Resources:  ATP IV Guidelines  Pooled Cohorts Equation Calculator  Breast Cancer Risk Calculator  FRAX Risk Assessment  ICSI Preventive Guidelines  Dietary Guidelines for Americans, 2010  USDA's MyPlate  ASA Prophylaxis  Lung CA Screening    Gauri Early MD  [facility]  "

## 2019-09-09 NOTE — NURSING NOTE
"BP (!) 144/93 (BP Location: Right arm, Patient Position: Sitting, Cuff Size: Adult Regular)   Pulse 77   Temp 98  F (36.7  C) (Oral)   Resp 18   Ht 1.727 m (5' 8\")   Wt 105.1 kg (231 lb 9.6 oz)   LMP 10/29/2011   SpO2 98%   BMI 35.21 kg/m    Patient here for a physical and is fasting.  "

## 2019-09-09 NOTE — PATIENT INSTRUCTIONS
PREVENTIVE HEALTH RECOMMENDATIONS:     Vaccines: Get a flu shot each year. Get a tetanus shot every 10 years.     Exercise for at least 150 minutes a week (an average of 30 minutes a day, 5 days of the week). This will help you control your weight and prevent disease.    Limit alcohol to one drink per day.    No smoking.     Wear sunscreen to prevent skin cancer.     See your dentist twice a year for an exam and cleaning.    Try to get Calcium 1200 mg total per day. It is best to not take it all at once. Try to get Vitamin D at least 3953-0013 units (25-50 mcg) per day.    BMI or Body Mass Index is a way of indicating weight and health risk for cardiovascular diseases, high blood pressure, diabetes.   Definitions:    Underweight is less than 18.5 and will be associated with health risk.   Normal BMI is 18.5 to 25   Overweight is 25-29   Obesity is 30 or greater   Morbid Obesity is 40 or greater or 35 or greater with diabetes, prediabetes or abnormal blood sugar, high blood pressure or elevated cholesterol  Obesity and Morbid Obesity are associated with higher health risks. Lowering calories, exercising more may lower your BMI and even small decreases can have positive impact on lowering health risks.   Your Body mass index is 35.21 kg/m ..,

## 2019-09-10 LAB
ALBUMIN SERPL-MCNC: 3.9 G/DL (ref 3.4–5)
ALP SERPL-CCNC: 60 U/L (ref 40–150)
ALT SERPL W P-5'-P-CCNC: 82 U/L (ref 0–50)
ANION GAP SERPL CALCULATED.3IONS-SCNC: 6 MMOL/L (ref 3–14)
AST SERPL W P-5'-P-CCNC: 55 U/L (ref 0–45)
BILIRUB SERPL-MCNC: 0.5 MG/DL (ref 0.2–1.3)
BUN SERPL-MCNC: 13 MG/DL (ref 7–30)
CALCIUM SERPL-MCNC: 8.9 MG/DL (ref 8.5–10.1)
CHLORIDE SERPL-SCNC: 106 MMOL/L (ref 94–109)
CHOLEST SERPL-MCNC: 189 MG/DL
CO2 SERPL-SCNC: 25 MMOL/L (ref 20–32)
CREAT SERPL-MCNC: 0.68 MG/DL (ref 0.52–1.04)
GFR SERPL CREATININE-BSD FRML MDRD: >90 ML/MIN/{1.73_M2}
GLUCOSE SERPL-MCNC: 102 MG/DL (ref 70–99)
HDLC SERPL-MCNC: 31 MG/DL
LDLC SERPL CALC-MCNC: 129 MG/DL
NONHDLC SERPL-MCNC: 158 MG/DL
POTASSIUM SERPL-SCNC: 4.3 MMOL/L (ref 3.4–5.3)
PROT SERPL-MCNC: 7.9 G/DL (ref 6.8–8.8)
SODIUM SERPL-SCNC: 137 MMOL/L (ref 133–144)
TRIGL SERPL-MCNC: 146 MG/DL

## 2019-09-11 LAB
COPATH REPORT: NORMAL
PAP: NORMAL

## 2019-09-12 LAB
FINAL DIAGNOSIS: NORMAL
HPV HR 12 DNA CVX QL NAA+PROBE: NEGATIVE
HPV16 DNA SPEC QL NAA+PROBE: NEGATIVE
HPV18 DNA SPEC QL NAA+PROBE: NEGATIVE
SPECIMEN DESCRIPTION: NORMAL
SPECIMEN SOURCE CVX/VAG CYTO: NORMAL

## 2019-10-19 ENCOUNTER — ALLIED HEALTH/NURSE VISIT (OUTPATIENT)
Dept: NURSING | Facility: CLINIC | Age: 54
End: 2019-10-19
Payer: COMMERCIAL

## 2019-10-19 DIAGNOSIS — Z23 NEED FOR PROPHYLACTIC VACCINATION AND INOCULATION AGAINST INFLUENZA: Primary | ICD-10-CM

## 2019-10-19 PROCEDURE — 90471 IMMUNIZATION ADMIN: CPT

## 2019-10-19 PROCEDURE — 90682 RIV4 VACC RECOMBINANT DNA IM: CPT

## 2019-10-31 ENCOUNTER — HEALTH MAINTENANCE LETTER (OUTPATIENT)
Age: 54
End: 2019-10-31

## 2019-11-08 ENCOUNTER — ALLIED HEALTH/NURSE VISIT (OUTPATIENT)
Dept: NURSING | Facility: CLINIC | Age: 54
End: 2019-11-08
Payer: COMMERCIAL

## 2019-11-08 VITALS — HEART RATE: 67 BPM | RESPIRATION RATE: 18 BRPM | SYSTOLIC BLOOD PRESSURE: 132 MMHG | DIASTOLIC BLOOD PRESSURE: 96 MMHG

## 2019-11-08 DIAGNOSIS — Z01.30 BP CHECK: Primary | ICD-10-CM

## 2019-11-08 NOTE — PROGRESS NOTES
Joycelyn Laird is a 54 year old year old patient who comes in today for a Blood Pressure check because of elevated BP reading at last office visit.    Vital Signs as repeated by /96, HR-67    Patient is not prescribed blood pressure medication at this time.  Patient is not monitoring Blood Pressure at home but will start checking once per day  Current complaints: none    Disposition:  patient instructed to eat low salt diet and stick with whole foods like fresh fruits/veggies and lean meats. Avoid boxed and process foods and eating out as she does eat out a lot. Patient advised to increase exercise and practice stress management.  Deep breathing discussed.      BP Readings from Last 6 Encounters:   11/08/19 (!) 132/96   09/09/19 139/88   08/30/19 126/80   07/18/19 128/82   07/18/19 128/82   03/23/19 126/80         Will route to Dr. Early for review and further recommendations.        Kaylan Lockhart, BS, RN, PHN  [facility]) [phone]

## 2019-11-15 ENCOUNTER — MYC MEDICAL ADVICE (OUTPATIENT)
Dept: INTERNAL MEDICINE | Facility: CLINIC | Age: 54
End: 2019-11-15

## 2019-12-19 ENCOUNTER — ALLIED HEALTH/NURSE VISIT (OUTPATIENT)
Dept: NURSING | Facility: CLINIC | Age: 54
End: 2019-12-19
Payer: COMMERCIAL

## 2019-12-19 VITALS
BODY MASS INDEX: 34.88 KG/M2 | RESPIRATION RATE: 16 BRPM | OXYGEN SATURATION: 98 % | DIASTOLIC BLOOD PRESSURE: 90 MMHG | SYSTOLIC BLOOD PRESSURE: 144 MMHG | HEART RATE: 66 BPM | WEIGHT: 229.4 LBS

## 2019-12-19 DIAGNOSIS — Z01.30 BP CHECK: Primary | ICD-10-CM

## 2019-12-19 PROCEDURE — 99207 ZZC NO CHARGE NURSE ONLY: CPT

## 2019-12-19 NOTE — PROGRESS NOTES
Hypertension Follow-up      Do you check your blood pressure regularly outside of the clinic? Yes     Are you following a low salt diet? No    Are your blood pressures ever more than 140 on the top number (systolic) OR more   than 90 on the bottom number (diastolic), for example 140/90? Yes      How many servings of fruits and vegetables do you eat daily?  2-3    On average, how many sweetened beverages do you drink each day (Examples: soda, juice, sweet tea, etc.  Do NOT count diet or artificially sweetened beverages)?   0    How many days per week do you miss taking your medication? 0    Wt Readings from Last 2 Encounters:   09/09/19 105.1 kg (231 lb 9.6 oz)   08/30/19 104.3 kg (230 lb)     BP Readings from Last 6 Encounters:   11/08/19 (!) 132/96   09/09/19 139/88   08/30/19 126/80   07/18/19 128/82   07/18/19 128/82   03/23/19 126/80        DENIES: CP, SOB, Difficulty Breathing, Dizziness/Lightheadedness, Numbness/Tingling, HA, Vision/Hearing Changes, N/V, Palpitations    Pt brought home monitor to check against readings in clinic. Pt home monitor cuff does not fit correctly. Home monitor reading was 178/98.  Writer advised to look into another cuff size if possible. Writer advised low salt diet, increase fluid (H2O), exercise. Patient stated an understanding and agreed with plan.    Routing to PCP for FYI, will continue to monitor.    Darwin Jaime RN   [facility]

## 2019-12-19 NOTE — TELEPHONE ENCOUNTER
Concern - Reaction to Shingrex vaccination     Onset: 6/7/18    Description:   Red at injection site    Intensity: moderate    Progression of Symptoms:  improving    Accompanying Signs & Symptoms:  denies    Previous history of similar problem:   never    Precipitating factors:   Worsened by: touching     Alleviating factors:  Improved by: nothing      On 6/6/18 had Shingrex vaccination and noticed sensitive to touch at injection site left arm, then noticed on Friday it was bright red 1 inch by 4 inches of redness, no swelling. Yesterday redness had faded and it was brownish; now is brownish purple but is getting smaller. No other symptoms, no swelling no SOB. She just thought she should let her medical staff know. Wants me to let Dr. NUAR Early and Dr. Arriola know. I informed her if they have any instructions she would be called back. She is not concerned and has just been monitoring it at home.   Nicolle Zamora.    [>50% of Time Spent on Counseling for ____] : Greater than 50% of the encounter time was spent on counseling for [unfilled] [Time Spent: ___ minutes] : I have spent [unfilled] minutes of face to face time with the patient [FreeTextEntry1] : DOYLE Roe

## 2020-01-16 ENCOUNTER — OFFICE VISIT (OUTPATIENT)
Dept: FAMILY MEDICINE | Facility: CLINIC | Age: 55
End: 2020-01-16
Payer: COMMERCIAL

## 2020-01-16 VITALS
BODY MASS INDEX: 34.71 KG/M2 | HEART RATE: 84 BPM | DIASTOLIC BLOOD PRESSURE: 88 MMHG | HEIGHT: 68 IN | TEMPERATURE: 97.7 F | WEIGHT: 229 LBS | OXYGEN SATURATION: 98 % | SYSTOLIC BLOOD PRESSURE: 138 MMHG

## 2020-01-16 DIAGNOSIS — J40 BRONCHITIS: Primary | ICD-10-CM

## 2020-01-16 PROCEDURE — 99213 OFFICE O/P EST LOW 20 MIN: CPT | Performed by: NURSE PRACTITIONER

## 2020-01-16 RX ORDER — BENZONATATE 200 MG/1
200 CAPSULE ORAL 3 TIMES DAILY PRN
Qty: 30 CAPSULE | Refills: 0 | Status: SHIPPED | OUTPATIENT
Start: 2020-01-16 | End: 2020-01-21

## 2020-01-16 RX ORDER — AZITHROMYCIN 250 MG/1
TABLET, FILM COATED ORAL
Qty: 6 TABLET | Refills: 0 | Status: SHIPPED | OUTPATIENT
Start: 2020-01-16 | End: 2020-01-21

## 2020-01-16 ASSESSMENT — MIFFLIN-ST. JEOR: SCORE: 1697.24

## 2020-01-16 NOTE — PROGRESS NOTES
"Subjective   Joycelyn Laird is a 54 year old female who presents to clinic today for the following health issues:    HPI   Acute Illness   Acute illness concerns: cough   Onset: x 12 days     Fever: no     Chills/Sweats: YES- has improved     Headache (location?): YES    Sinus Pressure:YES    Conjunctivitis:  no    Ear Pain: YES: right, popped     Rhinorrhea: YES    Congestion: YES    Sore Throat: no      Cough: YES-non-productive    Wheeze: YES    Decreased Appetite: no    Nausea: no    Vomiting: no    Diarrhea:  no    Dysuria/Freq.: no    Fatigue/Achiness: YES    Sick/Strep Exposure: no      Therapies Tried and outcome: Humidifier, rest, fluids.     Patient with cough x12 days.  Cough is nonproductive, has some wheezing and shortness of breath more so than usual with the cough.  Feels tight in the chest.  Initially upon presentation 12 days ago had some fever and chills which subsided after couple of days sinus symptoms also presented at that time and have subsided since then.  Cough remains.    Reviewed and updated as needed this visit by provider:  Tobacco  Allergies  Meds  Problems  Med Hx  Surg Hx  Fam Hx         Review of Systems   Constitutional, HEENT, cardiovascular, pulmonary, GI, , musculoskeletal, neuro, skin, endocrine and psych systems are negative, except as otherwise noted per HPI.      Objective   /88   Pulse 84   Temp 97.7  F (36.5  C) (Tympanic)   Ht 1.727 m (5' 8\")   Wt 103.9 kg (229 lb)   LMP 10/29/2011   SpO2 98%   Breastfeeding No   BMI 34.82 kg/m   Body mass index is 34.82 kg/m .  Physical Exam   GENERAL: healthy, alert, well nourished, well hydrated, no distress  HENT: ear canals- normal; TMs- normal; Nose- normal; Mouth- no ulcers, no lesions  NECK: no tenderness, no adenopathy, no asymmetry, no masses, no stiffness; thyroid- normal to palpation  RESP: lungs clear to auscultation, tight- no rales, no rhonchi, no wheezes, some wheezing with cough.  Cough sounds " barky  CV: regular rates and rhythm, normal S1 S2, no S3 or S4 and no murmur, no click or rub -  MS: extremities- no gross deformities noted, no edema          Assessment & Plan   Joycelyn was seen today for cough.    Diagnoses and all orders for this visit:    Bronchitis  -     azithromycin (ZITHROMAX) 250 MG tablet; Two tablets first day, then one tablet daily for four days  -     benzonatate (TESSALON) 200 MG capsule; Take 1 capsule (200 mg) by mouth 3 times daily as needed for cough      Symptoms consistent with bronchitis given ongoing nature of symptoms treat as above.  Follow-up if not improving as expected       See Patient Instructions    No follow-ups on file.            ALMA DELIA Swain     [address] [address]  [email or web address]   Office: [phone]

## 2020-01-22 ENCOUNTER — ALLIED HEALTH/NURSE VISIT (OUTPATIENT)
Dept: NURSING | Facility: CLINIC | Age: 55
End: 2020-01-22
Payer: COMMERCIAL

## 2020-01-22 VITALS — SYSTOLIC BLOOD PRESSURE: 146 MMHG | HEART RATE: 67 BPM | DIASTOLIC BLOOD PRESSURE: 88 MMHG | OXYGEN SATURATION: 97 %

## 2020-01-22 DIAGNOSIS — R03.0 ELEVATED BP WITHOUT DIAGNOSIS OF HYPERTENSION: Primary | ICD-10-CM

## 2020-01-22 PROCEDURE — 99207 ZZC NO CHARGE NURSE ONLY: CPT

## 2020-01-22 NOTE — PROGRESS NOTES
Hypertension Follow-up      Do you check your blood pressure regularly outside of the clinic? Yes     Are you following a low salt diet? Yes    Are your blood pressures ever more than 140 on the top number (systolic) OR more   than 90 on the bottom number (diastolic), for example 140/90? No      How many servings of fruits and vegetables do you eat daily?  2-3    On average, how many sweetened beverages do you drink each day (Examples: soda, juice, sweet tea, etc.  Do NOT count diet or artificially sweetened beverages)?   1    How many days per week do you exercise enough to make your heart beat faster? 3 or less    How many minutes a day do you exercise enough to make your heart beat faster? 10 - 19    How many days per week do you miss taking your medication? 0     BP Readings from Last 3 Encounters:   01/16/20 138/88   12/19/19 (!) 144/90   11/08/19 (!) 132/96     Denies: CP, SOB, headaches, blurred vision, N/V, numbness or tingling.     Pt stated that she has been sick the last few weeks with sinus stuff and was given a z-britta to take just in case it got worse, pt has been dealing with sinus pressure and swollen lymph nodes and not sleeping well and thinks this may be causing some of her elevated BP this week.     Rn advised to take it per the directions of her LOV on 1/16/2020  And return for BP check when feeling better     Patient stated an understanding and agreed with plan.    Elisabet Parrish RN, BSN  [facility]

## 2020-02-08 ENCOUNTER — HEALTH MAINTENANCE LETTER (OUTPATIENT)
Age: 55
End: 2020-02-08

## 2020-03-03 ENCOUNTER — OFFICE VISIT (OUTPATIENT)
Dept: INTERNAL MEDICINE | Facility: CLINIC | Age: 55
End: 2020-03-03
Payer: COMMERCIAL

## 2020-03-03 VITALS
SYSTOLIC BLOOD PRESSURE: 153 MMHG | HEIGHT: 68 IN | DIASTOLIC BLOOD PRESSURE: 95 MMHG | RESPIRATION RATE: 16 BRPM | TEMPERATURE: 98.2 F | WEIGHT: 229.9 LBS | OXYGEN SATURATION: 97 % | BODY MASS INDEX: 34.84 KG/M2 | HEART RATE: 87 BPM

## 2020-03-03 DIAGNOSIS — I10 BENIGN ESSENTIAL HYPERTENSION: Primary | ICD-10-CM

## 2020-03-03 PROCEDURE — 99213 OFFICE O/P EST LOW 20 MIN: CPT | Performed by: INTERNAL MEDICINE

## 2020-03-03 RX ORDER — LISINOPRIL 10 MG/1
10 TABLET ORAL DAILY
Qty: 30 TABLET | Refills: 1 | Status: SHIPPED | OUTPATIENT
Start: 2020-03-03 | End: 2020-04-23

## 2020-03-03 ASSESSMENT — MIFFLIN-ST. JEOR: SCORE: 1701.32

## 2020-03-03 NOTE — NURSING NOTE
"BP (!) 153/95 (BP Location: Left arm, Patient Position: Sitting, Cuff Size: Adult Regular)   Pulse 87   Temp 98.2  F (36.8  C) (Oral)   Resp 16   Ht 1.727 m (5' 8\")   Wt 104.3 kg (229 lb 14.4 oz)   LMP 10/29/2011   SpO2 97%   BMI 34.96 kg/m      "

## 2020-03-03 NOTE — PROGRESS NOTES
"Subjective     Joycelyn Laird is a 54 year old female who presents to clinic today for the following health issues:    HPI     Concerns of consistently elevated blood pressure:  She has done several readings here since her physical last fall.  She did have her home machine checked which was fairly close and those readings also have been varying.  Home readings are 130/90, 138/88, 146/88, 144/90, 154/92, 148/101, 134/96.  She is going to be doing some traveling out of the country in the future and is a little concerned about making sure her blood pressures controlled for that.  She has been trying very hard to watch the salt in her diet.    She has not been having any symptoms related to the blood pressure.    Patient Active Problem List   Diagnosis     Fatty liver     Eczema     CARDIOVASCULAR SCREENING; LDL GOAL LESS THAN 130     Morbid obesity (H)     Glaucoma     Current Outpatient Medications   Medication Sig Dispense Refill     calcium carbonate (OS-RAINER) 500 MG tablet Take 1 tablet by mouth 2 times daily       clobetasol (TEMOVATE) 0.05 % cream Apply topically as needed       fish oil-omega-3 fatty acids (OMEGA 3) 1000 MG capsule Take 1 g by mouth every other day  90 capsule 3     Flaxseed, Linseed, (FLAXSEED OIL) 1200 MG CAPS Take by mouth every other day        fluocinonide (LIDEX) 0.05 % cream Apply small amount to affected area bid as needed 30 g 2     latanoprost (XALATAN) 0.005 % ophthalmic solution Place 1 drop into both eyes daily       MULTIVITAMIN TABS   OR                 Reviewed and updated as needed this visit by Provider         Review of Systems   Negative      Objective    BP (!) 153/95 (BP Location: Left arm, Patient Position: Sitting, Cuff Size: Adult Regular)   Pulse 87   Temp 98.2  F (36.8  C) (Oral)   Resp 16   Ht 1.727 m (5' 8\")   Wt 104.3 kg (229 lb 14.4 oz)   LMP 10/29/2011   SpO2 97%   BMI 34.96 kg/m    Body mass index is 34.96 kg/m .  Physical Exam       Not examined.     "     Assessment & Plan     1. Benign essential hypertension  Advised that I do think she probably has true hypertension.  Advised about causes, genetics, low-salt diet, exercise and weight.  Since she will be traveling out of the country, we elected to start her on medication, will use lisinopril 10 mg daily.  Advised on how to take the medication, expected benefits, potential side effects including angioedema and cough.  Call if any problems with medication.  Monitor blood pressure at home and call if it remains greater than 140/90 most of the time.  - lisinopril (ZESTRIL) 10 MG tablet; Take 1 tablet (10 mg) by mouth daily  Dispense: 30 tablet; Refill: 1       No follow-ups on file.    Gauri Early MD  [facility]

## 2020-03-06 ENCOUNTER — NURSE TRIAGE (OUTPATIENT)
Dept: INTERNAL MEDICINE | Facility: CLINIC | Age: 55
End: 2020-03-06

## 2020-03-06 NOTE — TELEPHONE ENCOUNTER
Call back to patient, patient states the rash was only on the arms, it is not itchy or painful. She has no swelling of the face or breathing issues. Patient agrees to taking picture of this and if worsens or does not improve she will send us a [email or web address] message while she is out of town. Patient does not believe rash is related to medication but will continue to monitor and let us know if anything changes. Patient will continue medication as prescribed.

## 2020-03-06 NOTE — TELEPHONE ENCOUNTER
Rash was on arms only?  Is the rash itchy or painful?  Usually rash from medications is located on torso.    Please make sure no issues with breathing or facial swelling.    Maybe she could take a picture of rash incase continues to fade and see same day provider today if openings.

## 2020-03-06 NOTE — TELEPHONE ENCOUNTER
Patient calls. She started Lisinopril on Tuesday and on Thursday morning she noticed a rash on both forearms a few inches above her wrist. Describes rash as blotchy, dark red and flat. No sores, drainage or itching. Patient states Dr. Early told this this was a possible reaction to Lisinopril. She did not take Lisinopril yesterday and today rash is not as noticeable. She asks if there is something else she should be taking.     She is leaving tomorrow for a 1 week vacation.    Additional Information    Negative: Sounds like a life-threatening emergency to the triager    Negative: Possible contact with poison ivy or oak    Negative: Insect bite(s) suspected    Negative: Athlete's Foot suspected (i.e., itchy rash between the toes)    Negative: Jock Itch suspected (i.e., itchy rash on inner thighs near genital area)    Negative: Wound infection suspected (i.e., pain, spreading redness, or pus; in a cut, puncture, scrape or sutured wound)    Negative: Rash of external female genital area (vulva)    Negative: Rash of penis or scrotum    Negative: Small spot, skin growth, or mole    Negative: Fever and localized purple or blood-colored spots or dots that are not from injury or friction    Negative: Fever and localized rash is very painful    Negative: Patient sounds very sick or weak to the triager    Negative: Looks like a boil, infected sore, deep ulcer, or other infected rash (spreading redness, pus)    Negative: Painful rash with multiple small blisters grouped together (i.e., dermatomal distribution or 'band' or 'stripe')    Negative: Localized rash is very painful (no fever)    Negative: Localized purple or blood-colored spots or dots that are not from injury or friction (no fever)    Negative: Lyme disease suspected (e.g., bull's-eye rash or tick bite / exposure)    Negative: Patient wants to be seen    Negative: Tender bumps in armpits    Negative: Pimples (localized) and no improvement after using CARE ADVICE     "Negative: SEVERE local itching persists after 2 days of steroid cream    Negative: Applying cream or ointment and it causes severe itch, burning, or pain    Negative: Localized rash present > 7 days    Answer Assessment - Initial Assessment Questions  1. APPEARANCE of RASH: \"Describe the rash.\"       Blotchy, dark red and flat  2. LOCATION: \"Where is the rash located?\"       Bilateral forearms  3. NUMBER: \"How many spots are there?\"       Several spots on each arm  4. SIZE: \"How big are the spots?\" (Inches, centimeters or compare to size of a coin)       About an inch in size  5. ONSET: \"When did the rash start?\"       Thursday morning  6. ITCHING: \"Does the rash itch?\" If so, ask: \"How bad is the itch?\"  (Scale 1-10; or mild, moderate, severe)      no  7. PAIN: \"Does the rash hurt?\" If so, ask: \"How bad is the pain?\"  (Scale 1-10; or mild, moderate, severe)      no  8. OTHER SYMPTOMS: \"Do you have any other symptoms?\" (e.g., fever)      no  9. PREGNANCY: \"Is there any chance you are pregnant?\" \"When was your last menstrual period?\"      n/a    Protocols used: RASH OR REDNESS - [id number]-W-RE      "

## 2020-03-12 PROBLEM — I10 BENIGN ESSENTIAL HYPERTENSION: Status: ACTIVE | Noted: 2020-03-12

## 2020-04-22 ENCOUNTER — MYC MEDICAL ADVICE (OUTPATIENT)
Dept: INTERNAL MEDICINE | Facility: CLINIC | Age: 55
End: 2020-04-22

## 2020-04-22 DIAGNOSIS — I10 BENIGN ESSENTIAL HYPERTENSION: ICD-10-CM

## 2020-04-23 ENCOUNTER — MYC MEDICAL ADVICE (OUTPATIENT)
Dept: INTERNAL MEDICINE | Facility: CLINIC | Age: 55
End: 2020-04-23

## 2020-04-23 RX ORDER — LISINOPRIL 10 MG/1
10 TABLET ORAL DAILY
Qty: 90 TABLET | Refills: 1 | Status: SHIPPED | OUTPATIENT
Start: 2020-04-23 | End: 2020-06-08 | Stop reason: SINTOL

## 2020-04-23 NOTE — TELEPHONE ENCOUNTER
"Please see message below and advise. Patient sending in blood pressure readings.     Per 3/3/20 office visit note:  \"1. Benign essential hypertension  Advised that I do think she probably has true hypertension.  Advised about causes, genetics, low-salt diet, exercise and weight.  Since she will be traveling out of the country, we elected to start her on medication, will use lisinopril 10 mg daily.  Advised on how to take the medication, expected benefits, potential side effects including angioedema and cough.  Call if any problems with medication.  Monitor blood pressure at home and call if it remains greater than 140/90 most of the time.  - lisinopril (ZESTRIL) 10 MG tablet; Take 1 tablet (10 mg) by mouth daily  Dispense: 30 tablet; Refill: 1\"  "

## 2020-06-04 ENCOUNTER — MYC MEDICAL ADVICE (OUTPATIENT)
Dept: INTERNAL MEDICINE | Facility: CLINIC | Age: 55
End: 2020-06-04

## 2020-06-04 DIAGNOSIS — I10 BENIGN ESSENTIAL HYPERTENSION: Primary | ICD-10-CM

## 2020-06-08 RX ORDER — LOSARTAN POTASSIUM 50 MG/1
50 TABLET ORAL DAILY
Qty: 90 TABLET | Refills: 1 | Status: SHIPPED | OUTPATIENT
Start: 2020-06-08 | End: 2020-09-21

## 2020-06-17 ENCOUNTER — MYC MEDICAL ADVICE (OUTPATIENT)
Dept: INTERNAL MEDICINE | Facility: CLINIC | Age: 55
End: 2020-06-17

## 2020-06-17 DIAGNOSIS — I10 BENIGN ESSENTIAL HYPERTENSION: Primary | ICD-10-CM

## 2020-06-18 RX ORDER — IRBESARTAN 75 MG/1
75 TABLET ORAL AT BEDTIME
Qty: 30 TABLET | Refills: 1 | Status: SHIPPED | OUTPATIENT
Start: 2020-06-18 | End: 2020-06-18

## 2020-06-18 NOTE — TELEPHONE ENCOUNTER
Please call the pharmacy and tell them to cancel the prescription I just sent for Irbesartan.  I did not realize I had already sent losartan.

## 2020-08-16 ENCOUNTER — NURSE TRIAGE (OUTPATIENT)
Dept: NURSING | Facility: CLINIC | Age: 55
End: 2020-08-16

## 2020-08-16 NOTE — TELEPHONE ENCOUNTER
Call from pt       CC:  Possible UTI        > Burining   > Hesitancy   >No back pain   > No flank pain    > No vaginal discharge         A/P:  > OK for in-person visit   > Discussed UC / WIC - she has a [facility] she will try - I also indicated other [facility] are available today as well > Other care advice as noted         Horacio Rodriguez RN             COVID 19 Nurse Triage Plan/Patient Instructions    Please be aware that novel coronavirus (COVID-19) may be circulating in the community. If you develop symptoms such as fever, cough, or SOB or if you have concerns about the presence of another infection including coronavirus (COVID-19), please contact your health care provider or visit [email or web address].     Disposition/Instructions    In-Person Visit with provider recommended. Reference Visit Selection Guide.    Thank you for taking steps to prevent the spread of this virus.  o Limit your contact with others.  o Wear a simple mask to cover your cough.  o Wash your hands well and often.    Resources    [facility]: About COVID-19: [email or web address]    CDC: What to Do If You're Sick: www.cdc.gov/coronavirus/2019-ncov/about/steps-when-sick.html    CDC: Ending Home Isolation: www.cdc.gov/coronavirus/2019-ncov/hcp/disposition-in-home-patients.html     CDC: Caring for Someone: www.cdc.gov/coronavirus/2019-ncov/if-you-are-sick/care-for-someone.html     [facility]: Interim Guidance for Hospital Discharge to Home: www.health.[facility].mn.us/diseases/coronavirus/hcp/hospdischarge.pdf    [facility] clinical trials (COVID-19 research studies): clinicalaffairs.[facility]-clinical-trials     Below are the COVID-19 hotlines at the [facility] ([facility]). Interpreters are available.   o For health questions: Call 142-784-0439 or 1-943.458.4611 (7 a.m. to 7 p.m.)  o For questions about schools and childcare: Call 199-051-2958 or 1-129.661.2558 (7 a.m. to 7 p.m.)                                  Additional Information    Negative: Shock suspected (e.g., cold/pale/clammy skin, too weak to stand, low BP, rapid pulse)    Negative: Sounds like a life-threatening emergency to the triager    Negative: Followed a genital area injury    Negative: Taking antibiotic for urinary tract infection (UTI)    Negative: Pregnant    Negative: Postpartum (from 0 to 6 weeks after delivery)    Negative: [1] Unable to urinate (or only a few drops) > 4 hours AND [2] bladder feels very full (e.g., palpable bladder or strong urge to urinate)    Negative: Vomiting    Negative: Patient sounds very sick or weak to the triager    Negative: [1] SEVERE pain with urination  (e.g., excruciating) AND [2] not improved after 2 hours of pain medicine and Sitz bath    Negative: Fever > 100.5 F (38.1 C)    Negative: Side (flank) or lower back pain present    Negative: Bedridden (e.g., nursing home patient, CVA, chronic illness, recovering from surgery)    Negative: Artificial heart valve or artificial joint    Negative: Diabetes mellitus or weak immune system (e.g., HIV positive, cancer chemo, splenectomy, organ transplant, chronic steroids)    Negative: Unusual vaginal discharge (e.g., bad smelling, yellow, green, or foamy-white)    Negative: Patient is worried about sexually transmitted disease (STD)    Negative: Possibility of pregnancy    Negative: Blood in urine (red, pink, or tea-colored)    Age > 50 years    Protocols used: [facility] - [facility]

## 2020-09-19 ASSESSMENT — ENCOUNTER SYMPTOMS
HEARTBURN: 0
FREQUENCY: 0
COUGH: 0
WEAKNESS: 0
ABDOMINAL PAIN: 0
CONSTIPATION: 0
CHILLS: 0
MYALGIAS: 0
BREAST MASS: 0
SORE THROAT: 0
FEVER: 0
HEMATOCHEZIA: 0
DIZZINESS: 0
HEADACHES: 1
DIARRHEA: 0
HEMATURIA: 0
NERVOUS/ANXIOUS: 0
JOINT SWELLING: 0
ARTHRALGIAS: 0
DYSURIA: 0
EYE PAIN: 0
PALPITATIONS: 0
SHORTNESS OF BREATH: 0
NAUSEA: 0
PARESTHESIAS: 0

## 2020-09-21 ENCOUNTER — OFFICE VISIT (OUTPATIENT)
Dept: INTERNAL MEDICINE | Facility: CLINIC | Age: 55
End: 2020-09-21
Payer: COMMERCIAL

## 2020-09-21 VITALS
HEART RATE: 77 BPM | HEIGHT: 68 IN | SYSTOLIC BLOOD PRESSURE: 131 MMHG | RESPIRATION RATE: 16 BRPM | OXYGEN SATURATION: 99 % | BODY MASS INDEX: 29.28 KG/M2 | WEIGHT: 193.2 LBS | TEMPERATURE: 98.6 F | DIASTOLIC BLOOD PRESSURE: 87 MMHG

## 2020-09-21 DIAGNOSIS — I10 BENIGN ESSENTIAL HYPERTENSION: ICD-10-CM

## 2020-09-21 DIAGNOSIS — Z00.00 ENCOUNTER FOR ROUTINE ADULT HEALTH EXAMINATION WITHOUT ABNORMAL FINDINGS: Primary | ICD-10-CM

## 2020-09-21 DIAGNOSIS — K76.0 FATTY LIVER: ICD-10-CM

## 2020-09-21 DIAGNOSIS — Z13.6 CARDIOVASCULAR SCREENING; LDL GOAL LESS THAN 130: ICD-10-CM

## 2020-09-21 DIAGNOSIS — E66.01 MORBID OBESITY (H): ICD-10-CM

## 2020-09-21 DIAGNOSIS — R21 RASH: ICD-10-CM

## 2020-09-21 LAB
ALBUMIN SERPL-MCNC: 4 G/DL (ref 3.4–5)
ALP SERPL-CCNC: 56 U/L (ref 40–150)
ALT SERPL W P-5'-P-CCNC: 40 U/L (ref 0–50)
ANION GAP SERPL CALCULATED.3IONS-SCNC: 5 MMOL/L (ref 3–14)
AST SERPL W P-5'-P-CCNC: 24 U/L (ref 0–45)
BILIRUB SERPL-MCNC: 0.5 MG/DL (ref 0.2–1.3)
BUN SERPL-MCNC: 12 MG/DL (ref 7–30)
CALCIUM SERPL-MCNC: 9.4 MG/DL (ref 8.5–10.1)
CHLORIDE SERPL-SCNC: 108 MMOL/L (ref 94–109)
CHOLEST SERPL-MCNC: 159 MG/DL
CO2 SERPL-SCNC: 27 MMOL/L (ref 20–32)
CREAT SERPL-MCNC: 0.7 MG/DL (ref 0.52–1.04)
CREAT UR-MCNC: 171 MG/DL
GFR SERPL CREATININE-BSD FRML MDRD: >90 ML/MIN/{1.73_M2}
GLUCOSE SERPL-MCNC: 102 MG/DL (ref 70–99)
HDLC SERPL-MCNC: 42 MG/DL
LDLC SERPL CALC-MCNC: 102 MG/DL
MICROALBUMIN UR-MCNC: 28 MG/L
MICROALBUMIN/CREAT UR: 16.26 MG/G CR (ref 0–25)
NONHDLC SERPL-MCNC: 117 MG/DL
POTASSIUM SERPL-SCNC: 3.7 MMOL/L (ref 3.4–5.3)
PROT SERPL-MCNC: 7.8 G/DL (ref 6.8–8.8)
SODIUM SERPL-SCNC: 140 MMOL/L (ref 133–144)
TRIGL SERPL-MCNC: 73 MG/DL

## 2020-09-21 PROCEDURE — 36415 COLL VENOUS BLD VENIPUNCTURE: CPT | Performed by: INTERNAL MEDICINE

## 2020-09-21 PROCEDURE — 80061 LIPID PANEL: CPT | Performed by: INTERNAL MEDICINE

## 2020-09-21 PROCEDURE — 90471 IMMUNIZATION ADMIN: CPT | Performed by: INTERNAL MEDICINE

## 2020-09-21 PROCEDURE — 80053 COMPREHEN METABOLIC PANEL: CPT | Performed by: INTERNAL MEDICINE

## 2020-09-21 PROCEDURE — 99214 OFFICE O/P EST MOD 30 MIN: CPT | Mod: 25 | Performed by: INTERNAL MEDICINE

## 2020-09-21 PROCEDURE — 82043 UR ALBUMIN QUANTITATIVE: CPT | Performed by: INTERNAL MEDICINE

## 2020-09-21 PROCEDURE — 90686 IIV4 VACC NO PRSV 0.5 ML IM: CPT | Performed by: INTERNAL MEDICINE

## 2020-09-21 PROCEDURE — 99396 PREV VISIT EST AGE 40-64: CPT | Mod: 25 | Performed by: INTERNAL MEDICINE

## 2020-09-21 ASSESSMENT — ENCOUNTER SYMPTOMS
ABDOMINAL PAIN: 0
DYSURIA: 0
EYE PAIN: 0
PALPITATIONS: 0
FREQUENCY: 0
COUGH: 0
HEARTBURN: 0
JOINT SWELLING: 0
SORE THROAT: 0
MYALGIAS: 0
ARTHRALGIAS: 0
NAUSEA: 0
DIARRHEA: 0
PARESTHESIAS: 0
BREAST MASS: 0
CONSTIPATION: 0
HEADACHES: 1
DIZZINESS: 0
SHORTNESS OF BREATH: 0
CHILLS: 0
HEMATOCHEZIA: 0
HEMATURIA: 0
FEVER: 0
NERVOUS/ANXIOUS: 0
WEAKNESS: 0

## 2020-09-21 ASSESSMENT — MIFFLIN-ST. JEOR: SCORE: 1529.85

## 2020-09-21 NOTE — PROGRESS NOTES
SUBJECTIVE:   CC: Joycelyn Laird is an 55 year old woman who presents for preventive health visit.       Patient has been advised of split billing requirements and indicates understanding: Yes  Healthy Habits:     Getting at least 3 servings of Calcium per day:  NO    Bi-annual eye exam:  Yes    Dental care twice a year:  Yes    Sleep apnea or symptoms of sleep apnea:  None    Diet:  Low salt, Low fat/cholesterol, Breakfast skipped and Other    Frequency of exercise:  6-7 days/week    Duration of exercise:  Greater than 60 minutes    Taking medications regularly:  Yes    Medication side effects:  None    PHQ-2 Total Score: 0    Additional concerns today:  Yes    Ability to successfully perform activities of daily living: Yes, no assistance needed  Home safety:  none identified   Hearing impairment: Ringing in ear      Other problems:   1. HTN: she stopped lisinopril and cough went away. She did not start medication yet. /81, 130/87, 111/80, 127/72, home machine checked here and runs slightly high.    2. Morbid obesity: lost weight  3. Fatty liver: lost weight, due for recheck      Today's PHQ-2 Score:   PHQ-2 ( 1999 Pfizer) 9/19/2020   Q1: Little interest or pleasure in doing things 0   Q2: Feeling down, depressed or hopeless 0   PHQ-2 Score 0   Q1: Little interest or pleasure in doing things Not at all   Q2: Feeling down, depressed or hopeless Not at all   PHQ-2 Score 0     Abuse: Current or Past (Physical, Sexual or Emotional) - No  Do you feel safe in your environment? Yes      Social History     Tobacco Use     Smoking status: Never Smoker     Smokeless tobacco: Never Used   Substance Use Topics     Alcohol use: Yes     Alcohol/week: 0.0 - 1.0 standard drinks     If you drink alcohol do you typically have >3 drinks per day or >7 drinks per week? No    Alcohol Use 9/19/2020   Prescreen: >3 drinks/day or >7 drinks/week? No   Prescreen: >3 drinks/day or >7 drinks/week? -   No flowsheet data  found.    Reviewed orders with patient.  Reviewed health maintenance and updated orders accordingly - Yes      Mammogram Screening: Patient over age 50, mutual decision to screen reflected in health maintenance.    Pertinent mammograms are reviewed under the imaging tab.  History of abnormal Pap smear: NO - age 30-65 PAP every 5 years with negative HPV co-testing recommended  PAP / HPV Latest Ref Rng & Units 9/9/2019 7/18/2016 3/29/2013   PAP - NIL NIL NIL   HPV 16 DNA NEG:Negative Negative - -   HPV 18 DNA NEG:Negative Negative - -   OTHER HR HPV NEG:Negative Negative - -     Reviewed and updated as needed this visit by clinical staff        Patient Active Problem List   Diagnosis     Fatty liver     Eczema     CARDIOVASCULAR SCREENING; LDL GOAL LESS THAN 130     Morbid obesity (H)     Glaucoma     Benign essential hypertension     Current Outpatient Medications   Medication Sig Dispense Refill     calcium carbonate (OS-RAINER) 500 MG tablet Take 1 tablet by mouth 2 times daily Patient is taking one tablet three times a week       fish oil-omega-3 fatty acids (OMEGA 3) 1000 MG capsule Take 1 g by mouth every other day  90 capsule 3     Flaxseed, Linseed, (FLAXSEED OIL) 1200 MG CAPS Take by mouth every other day        fluocinonide (LIDEX) 0.05 % cream Apply small amount to affected area bid as needed 30 g 2     latanoprost (XALATAN) 0.005 % ophthalmic solution Place 1 drop into both eyes daily       MULTIVITAMIN TABS   OR        clobetasol (TEMOVATE) 0.05 % cream Apply topically as needed       losartan (COZAAR) 50 MG tablet Take 1 tablet (50 mg) by mouth daily 90 tablet 1           Reviewed and updated as needed this visit by Provider            Review of Systems   Constitutional: Negative for chills and fever.   HENT: Positive for ear pain and hearing loss. Negative for congestion and sore throat.    Eyes: Negative for pain and visual disturbance.   Respiratory: Negative for cough and shortness of breath.   "  Cardiovascular: Negative for chest pain, palpitations and peripheral edema.   Gastrointestinal: Negative for abdominal pain, constipation, diarrhea, heartburn, hematochezia and nausea.   Breasts:  Negative for tenderness, breast mass and discharge.   Genitourinary: Negative for dysuria, frequency, genital sores, hematuria, pelvic pain, urgency, vaginal bleeding and vaginal discharge.   Musculoskeletal: Negative for arthralgias, joint swelling and myalgias.   Skin: Positive for rash.   Neurological: Positive for headaches. Negative for dizziness, weakness and paresthesias.   Psychiatric/Behavioral: Negative for mood changes. The patient is not nervous/anxious.      Red patches on arms, right cheek, son with ringworm, has not tried treatment  Scalp is itchy and has some pressure back of head at times, wondering if nodes     OBJECTIVE:   LMP 10/29/2011   Physical Exam  Patient alert, in no acute distress  /87 (BP Location: Left arm, Patient Position: Sitting, Cuff Size: Adult Regular)   Pulse 77   Temp 98.6  F (37  C) (Oral)   Resp 16   Ht 1.727 m (5' 8\")   Wt 87.6 kg (193 lb 3.2 oz)   LMP 10/29/2011   SpO2 99%   BMI 29.38 kg/m      HEENT: extraocular movements are intact, pupils equal and reactive to light and accommodation, TMs clear  NECK: Neck supple. No adenopathy. Thyroid symmetric, normal size,, Carotids without bruits.  PULMONARY: clear to auscultation  CARDIAC: regular rate and rhythm and no murmurs, clicks, or gallops  PULSES: 2/2 throughout  BACK: no spinal or CVAT  ABDOMINAL: Soft, nontender.  Normal bowel sounds.  No hepatosplenomegaly or abnormal masses  BREAST: No breast masses or tenderness, No axillary masses or tenderness and No galactorrhea  REFLEXES: 2+ throughout  SKIN: few red patches on arms, right cheek             ASSESSMENT/PLAN:   1. Encounter for routine adult health examination without abnormal findings  Up to date  - *MA Screening Digital Bilateral; Future    2. Morbid " "obesity (H)  Has lost enough weight no longer obese, resolve problem    3. Benign essential hypertension  Well controlled, can stay off med for now, call if readings >140/90 consistently  - Comprehensive metabolic panel (BMP + Alb, Alk Phos, ALT, AST, Total. Bili, TP)  - Albumin Random Urine Quantitative with Creat Ratio    4. CARDIOVASCULAR SCREENING; LDL GOAL LESS THAN 130  recheck  - Lipid panel reflex to direct LDL Fasting    5. Fatty liver  Recheck to see if improved with weight loss  - Comprehensive metabolic panel (BMP + Alb, Alk Phos, ALT, AST, Total. Bili, TP)    6. Rash: may be fungus, trial lotrimin, then HC if not resolved  Scalp probably eczema, continue nizoral. Reassured no nodes, pressure may be muscular.     Patient has been advised of split billing requirements and indicates understanding: Yes  COUNSELING:  Reviewed preventive health counseling, as reflected in patient instructions    Estimated body mass index is 34.96 kg/m  as calculated from the following:    Height as of 3/3/20: 1.727 m (5' 8\").    Weight as of 3/3/20: 104.3 kg (229 lb 14.4 oz).    Weight management plan: Discussed healthy diet and exercise guidelines    She reports that she has never smoked. She has never used smokeless tobacco.      Counseling Resources:  ATP IV Guidelines  Pooled Cohorts Equation Calculator  Breast Cancer Risk Calculator  BRCA-Related Cancer Risk Assessment: FHS-7 Tool  FRAX Risk Assessment  ICSI Preventive Guidelines  Dietary Guidelines for Americans, 2010  USDA's MyPlate  ASA Prophylaxis  Lung CA Screening    Gauri Early MD  [facility]  "

## 2020-09-21 NOTE — NURSING NOTE
"BP (!) 177/97 (BP Location: Left arm, Patient Position: Sitting, Cuff Size: Adult Regular)   Pulse 77   Temp 98.6  F (37  C) (Oral)   Resp 16   Ht 1.727 m (5' 8\")   Wt 87.6 kg (193 lb 3.2 oz)   LMP 10/29/2011   SpO2 99%   BMI 29.38 kg/m      "

## 2020-09-21 NOTE — PATIENT INSTRUCTIONS
PREVENTIVE HEALTH RECOMMENDATIONS:     Vaccines: Get a flu shot each year. Get a tetanus shot every 10 years.     Exercise for at least 150 minutes a week (an average of 30 minutes a day, 5 days of the week). This will help you control your weight and prevent disease.    Limit alcohol to one drink per day.    No smoking.     Wear sunscreen to prevent skin cancer.     See your dentist twice a year for an exam and cleaning.    Try to get Calcium 1200 mg total per day. It is best to not take it all at once. Try to get Vitamin D at least 1000 units (25 mcg) per day.    BMI or Body Mass Index is a way of indicating weight and health risk for cardiovascular diseases, high blood pressure, diabetes.   Definitions:    Underweight is less than 18.5 and will be associated with health risk.   Normal BMI is 18.5 to 25   Overweight is 25-29   Obesity is 30 or greater   Morbid Obesity is 40 or greater or 35 or greater with diabetes, prediabetes or abnormal blood sugar, high blood pressure or elevated cholesterol  Obesity and Morbid Obesity are associated with higher health risks. Lowering calories, exercising more may lower your BMI and even small decreases can have positive impact on lowering health risks.   Your Body mass index is 29.38 kg/m ..,

## 2020-09-22 ENCOUNTER — TELEPHONE (OUTPATIENT)
Dept: INTERNAL MEDICINE | Facility: CLINIC | Age: 55
End: 2020-09-22

## 2020-09-22 ENCOUNTER — HOSPITAL ENCOUNTER (EMERGENCY)
Facility: CLINIC | Age: 55
Discharge: HOME | End: 2020-09-22
Attending: PHYSICIAN ASSISTANT | Admitting: PHYSICIAN ASSISTANT
Payer: COMMERCIAL

## 2020-09-22 VITALS
TEMPERATURE: 97.6 F | DIASTOLIC BLOOD PRESSURE: 102 MMHG | OXYGEN SATURATION: 100 % | RESPIRATION RATE: 16 BRPM | BODY MASS INDEX: 29.19 KG/M2 | WEIGHT: 192 LBS | SYSTOLIC BLOOD PRESSURE: 196 MMHG | HEART RATE: 63 BPM

## 2020-09-22 DIAGNOSIS — S81.012A KNEE LACERATION, LEFT, INITIAL ENCOUNTER: ICD-10-CM
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
## 2020-09-22 DIAGNOSIS — R03.0 ELEVATED BLOOD PRESSURE READING: ICD-10-CM

## 2020-09-22 PROCEDURE — 12002 RPR S/N/AX/GEN/TRNK2.6-7.5CM: CPT

## 2020-09-22 PROCEDURE — 99283 EMERGENCY DEPT VISIT LOW MDM: CPT

## 2020-09-22 RX ORDER — LIDOCAINE HYDROCHLORIDE AND EPINEPHRINE 10; 10 MG/ML; UG/ML
INJECTION, SOLUTION INFILTRATION; PERINEURAL
Status: DISCONTINUED
Start: 2020-09-22 | End: 2020-09-22 | Stop reason: HOSPADM

## 2020-09-22 ASSESSMENT — ENCOUNTER SYMPTOMS
WOUND: 1
HEADACHES: 0

## 2020-09-22 NOTE — TELEPHONE ENCOUNTER
Patient calling stating 1 -1/2-2 inch gash under a gash under left knee, bleeding is under control but appears to be somewhat deep.    Advised patient to go to nearest  to be evaluated as there are no openings at [facility] clinic until 145. Patient stated understanding and was agreeable with plan.    CANDE GoncalvesN, RN  [employer] [facility]

## 2020-09-22 NOTE — DISCHARGE INSTRUCTIONS
*Follow up with primary care provider in 10 days for suture removal.   *Return for fevers, spreading redness, focal numbness/tingling or weakness, or any other concerning symptoms.        Discharge Instructions  Laceration (Cut)    You were seen today for a laceration (cut).  Your provider examined your laceration for any problems such a buried foreign body (like glass, a splinter, or gravel), or injury to blood vessels, tendons, and nerves.  Your provider may have also rinsed and/or scrubbed your laceration to help prevent an infection. It may not be possible to find all problems with your laceration on the first visit; occasionally foreign bodies or a tendon injury can go undetected.    Your laceration may have been closed in one of several ways:  No closure: many wounds will heal just fine without closure.  Stitches: regular stitches that require removal.  Staples: skin staples are often used in the scalp/head.  Wound adhesive (glue): skin glue can be used for certain lacerations and doesn t require removal.  Wound strips (aka Butterfly bandages or steri-strips): these are bandages that help to close a wound.  Absorbable stitches:  dissolving  stitches that go away on their own and usually don t require removal.    A small percentage of wounds will develop an infection regardless of how well the wound is cared for. Antibiotics are generally not indicated to prevent an infection so are only given for a small number of high-risk wounds. Some lacerations are too high risk to close, and are left open to heal because closure can increase the likelihood that an infection will develop.    Remember that all lacerations, no matter how expertly repaired, will cause scarring. We consider many factors, techniques, and materials, in our efforts to provide the best possible cosmetic outcome.    Generally, every Emergency Department visit should have a follow-up clinic visit with either a primary or a specialty clinic/provider.  Please follow-up as instructed by your emergency provider today.     Return to the Emergency Department right away if:  You have more redness, swelling, pain, drainage (pus), a bad smell, or red streaking from your laceration as these symptoms could indicate an infection.  You have a fever of 100.4 F or more.  You have bleeding that you cannot stop at home. If your cut starts to bleed, hold pressure on the bleeding area with a clean cloth or put pressure over the bandage.  If the bleeding does not stop after using constant pressure for 30 minutes, you should return to the Emergency Department for further treatment.  An area past the laceration is cool, pale, or blue compared with the other side, or has a slower return of color when squeezed.  Your dressing seems too tight or starts to get uncomfortable or painful. For children, signs of a problem might be irritability or restlessness.  You have loss of normal function or use of an area, such as being unable to straighten or bend a finger normally.  You have a numb area past the laceration.    Return to the Emergency Department or see your regular provider if:  The laceration starts to come open.   You have something coming out of the cut or a feeling that there is something in the laceration.  Your wound will not heal, or keeps breaking open. There can always be glass, wood, dirt or other things in any wound.  They will not always show up, even on x-rays.  If a wound does not heal, this may be why, and it is important to follow-up with your regular provider.    Home Care:  Take your dressing off in 12-24 hours, or as instructed by your provider, to check your laceration. Remove the dressing sooner if it seems too tight or painful, or if it is getting numb, tingly, or pale past the dressing.  Gently wash your laceration 1-2 times daily with clean water and mild soap. It is okay to shower or run clean water over the laceration, but do not let the laceration soak in  water (no swimming).  If your laceration was closed with wound adhesive or strips: pat it dry and leave it open to the air. For all other repairs: after you wash your laceration, or at least 2 times a day, apply antibiotic ointment (such as Neosporin  or Bacitracin ) to the laceration, then cover it with a Band-Aid  or gauze.  Keep the laceration clean. Wear gloves or other protective clothing if you are around dirt.    Follow-up for removal:  If your wound was closed with staples or regular stitches, they need to be removed according to the instructions and timeline specified by your provider today.  If your wound was closed with absorbable ( dissolving ) sutures, they should fall out, dissolve, or not be visible in about one week. If they are still visible, then they should be removed according to the instructions and timeline specified by your provider today.    Scars:  To help minimize scarring:  Wear sunscreen over the healed laceration when out in the sun.  Massage the area regularly once healed.  You may apply Vitamin E to the healed wound.  Wait. Scars improve in appearance over months and years.    If you were given a prescription for medicine here today, be sure to read all of the information (including the package insert) that comes with your prescription.  This will include important information about the medicine, its side effects, and any warnings that you need to know about.  The pharmacist who fills the prescription can provide more information and answer questions you may have about the medicine.  If you have questions or concerns that the pharmacist cannot address, please call or return to the Emergency Department.       Remember that you can always come back to the Emergency Department if you are not able to see your regular provider in the amount of time listed above, if you get any new symptoms, or if there is anything that worries you.

## 2020-09-22 NOTE — TELEPHONE ENCOUNTER
Patient was seen today in the ED to get stitches. Her BP was elevated and she is concerned and wants to know if she should start on medication. Call her back to advise at [phone] (home)

## 2020-09-22 NOTE — ED PROVIDER NOTES
History     Chief Complaint:  Laceration      The history is provided by the patient.      Joycelyn Laird is a 55 year old female who presents for evaluation of laceration to her left knee. The patient reports that she was walking down a hill earlier today when she slipped on some acorns and landed on her right knee on the blacktop. She did not hit her head or experience any loss of consciousness, and has been able to walk on her knee okay afterwards. She denies any other concerns. The patient last received her tetanus on 3/29/2013.    Allergies:  No Known Drug Allergies    Medications:    The patient is not currently taking any prescribed medications.    Past Medical History:    Benign essential hypertension  Eczema    Past Surgical History:     section  Colonoscopy  [place] tooth extraction  Endometrial biopsy    Family History:    Father - hypertension, hyperlipidemia, CAD, prostate cancer, PVD  Mother - hypertension, hyperlipidemia  Brother - colon cancer    Social History:  The patient was not accompanied to the ED.  Smoking Status: Never smoker  Smokeless Tobacco: Never used  Alcohol Use: Yes  Drug Use: No  Marital Status:      Review of Systems   Skin: Positive for wound (L knee).   Neurological: Negative for syncope and headaches.   All other systems reviewed and are negative.    Physical Exam     Patient Vitals for the past 24 hrs:   BP Temp Temp src Pulse Resp SpO2 Weight   20 1434 (!) 196/102 -- -- 63 -- -- --   20 1246 (!) 192/106 97.6  F (36.4  C) Temporal 71 16 100 % 87.1 kg (192 lb)       Physical Exam  Constitutional: Alert, attentive, GCS 15   CV: 2+ DP and PT pulses,  brisk distal cap refill  Pulm: No respiratory distress  MSK: full ROM of left knee without pain. No bony tenderness   Neurological: Alert, attentive  5/5 strength with DF and PF motor functions; sensation intact to BLE.   Skin: Skin is warm and dry. 2x2cm v shaped laceration just distal to the anterior left  knee. No FB. Does not involve the knee joint capsule.  Psych: Normal mood and affect     Emergency Department Course     Procedures       Laceration Repair        LACERATION:  A simple clean 2x2 cm laceration.      LOCATION:  Distal to the left knee      FUNCTION:  Distally sensation, circulation, motor and tendon function are intact.      ANESTHESIA:  Local using Lidocaine 1% with epinephrine total of 2 mLs      PREPARATION:  Irrigation and Scrubbing with Normal Saline and Shur Clens      DEBRIDEMENT:  no debridement      CLOSURE:  Wound was closed with One Layer.  Skin closed with six x 4.0 Ethylon using interrupted sutures.    Emergency Department Course:  Past medical records, nursing notes, and vitals reviewed.    1322 I performed an exam of the patient as documented above.     I performed a laceration repair on the patient's left knee, as noted above.    1431 I rechecked the patient and discussed the results of her workup thus far.     Findings and plan explained to the Patient. Patient discharged home with instructions regarding supportive care, medications, and reasons to return. The importance of close follow-up was reviewed.    I personally answered all related questions prior to discharge.     Impression & Plan     Medical Decision Making:  Joycelyn Laird is a 55 year old female presents for evaluation of a laceration to the left knee. Patient had mechanical fall while slipping on acorns during a walk outside. The wound was carefully evaluated and explored. There was no foreign body identified. CMS is intact. There is no evidence of muscular, tendon, bone, or nerve damage with this laceration. The laceration was closed as noted in the procedure note. The patient tolerated the procedure well. Possible complications (infection, scarring) were reviewed with the patient. Appropriate wound dressing was placed and daily cares were discussed. Tetanus up-to-date. They will be discharged home with primary care  follow up in 7 days for suture removal. They will return immediately for fevers, purulent drainage, spreading redness, increased pain or any other concerning symptoms. Patient agrees with the plan and all questions/concerns addressed prior to discharge home.     Of note, the patient's blood pressure is noted to be elevated here. She was seen by her primary care provider yesterday for same and they plan to continue to monitor as an outpatient and discuss starting blood pressure medication on recheck.  Patient understands reasons to return including chest pain, severe headache, vision changes, or any other new/concerning symptoms.    Diagnosis:    ICD-10-CM    1. Fall, initial encounter  W19.XXXA    2. Knee laceration, left, initial encounter  S81.012A    3. Elevated blood pressure reading  R03.0        Disposition:  Discharged to home.    Discharge Medications:  None.      Scribe Disclosure:  I, Deysi Hsu, am serving as a scribe at 1:22 PM on 9/22/2020 to document services personally performed by Laurie Espinoza PA-C based on my observations and the provider's statements to me.     Deysi Hsu  9/22/2020   [facility] EMERGENCY DEPARTMENT       Laurie Espinoza PA-C  09/22/20 [id number]       Laurie Espinoza PA-C  09/22/20 [id number]

## 2020-09-22 NOTE — ED AVS SNAPSHOT
[facility] Emergency Department  [address]  [facility] [address]  Phone:  [phone]  Fax:  [phone]                                    Joycelyn Laird   MRN: [id number]    Department:  [facility] Emergency Department   Date of Visit:  9/22/2020           After Visit Summary Signature Page    I have received my discharge instructions, and my questions have been answered. I have discussed any challenges I see with this plan with the nurse or doctor.    ..........................................................................................................................................  Patient/Patient Representative Signature      ..........................................................................................................................................  Patient Representative Print Name and Relationship to Patient    ..................................................               ................................................  Date                                   Time    ..........................................................................................................................................  Reviewed by Signature/Title    ...................................................              ..............................................  Date                                               Time          22EPIC Rev 08/18

## 2020-09-22 NOTE — TELEPHONE ENCOUNTER
Called patient and spoke with her regarding her blood pressure.  Likely blood pressure was elevated due to her laceration and going to the ER.  Patient stated she has been checking her blood pressure and it has been good.  Advised patient to continue to check her blood pressure as directed by primary care provider and to call with any abnormalities.  Advised to call if having any difficulty talking, walking, weakness on one side, headache.  Patient verbalized understanding.

## 2020-09-23 ENCOUNTER — TELEPHONE (OUTPATIENT)
Dept: FAMILY MEDICINE | Facility: CLINIC | Age: 55
End: 2020-09-23

## 2020-09-23 NOTE — TELEPHONE ENCOUNTER
General Call:     Who is calling:  Joycelyn    Reason for Call:  Joycelyn was at the ER and had 6 stiches placed below the knee, she has some questions about restrictions, if there are any.     What are your questions or concerns:      Date of last appointment with provider:     Okay to leave a detailed message:Yes at Home number on file [phone] (home)

## 2020-09-23 NOTE — TELEPHONE ENCOUNTER
Patient was seen at [facility] ED on 9/22/20 for fall, knee laceration  6 sutures were placed  ED Notes:   They will be discharged home with primary care follow up in 7 days for suture removal.    Called patient @ # below -     Patient stated the laceration is right below her knee - is it OK to move?   Writer advised on proper wound care, movement, s/sx of infection, and all other questions.   RN only appt already scheduled for 10/2 for suture removal.     Patient had no further questions.       Oliva Antoine RN  [facility]

## 2020-09-28 ENCOUNTER — TELEPHONE (OUTPATIENT)
Dept: NURSING | Facility: CLINIC | Age: 55
End: 2020-09-28

## 2020-09-28 NOTE — TELEPHONE ENCOUNTER
Called patient @ # below -     Patient was seen at [facility] on 9/22 for fall - had laceration on L knee, 6 sutures placed    Stated there is some slight yellow drainage coming from wound - started today. Only after shower.   DENIES: fevers, pain, warmth, redness, red streaks    Advised to continue to monitor sx. Advised patient that if new or worsening symptoms appear (reviewed new & worsening symptoms) to call the clinic or be seen in the the ER  Patient stated an understanding and agreed with plan.      Oliva Antoine RN  [facility]

## 2020-09-28 NOTE — TELEPHONE ENCOUNTER
General Call:     Who is calling:  patient    Reason for Call:  Joycelyn had sutures put in a [place] and has an appt Friday for removal. Patient is concerned b/c the sutures are oozing, wondering if there is a possible infection, and would like to speak with a nurse.    What are your questions or concerns:  n/a    Date of last appointment with provider: n/a    Okay to leave a detailed message:Yes at Cell number on file:    Telephone Information:   Mobile [phone]

## 2020-10-01 ENCOUNTER — HOSPITAL ENCOUNTER (OUTPATIENT)
Dept: MAMMOGRAPHY | Facility: CLINIC | Age: 55
Discharge: HOME | End: 2020-10-01
Attending: INTERNAL MEDICINE | Admitting: INTERNAL MEDICINE
Payer: COMMERCIAL

## 2020-10-01 DIAGNOSIS — Z00.00 ENCOUNTER FOR ROUTINE ADULT HEALTH EXAMINATION WITHOUT ABNORMAL FINDINGS: ICD-10-CM

## 2020-10-01 PROCEDURE — 77067 SCR MAMMO BI INCL CAD: CPT

## 2020-10-02 ENCOUNTER — ALLIED HEALTH/NURSE VISIT (OUTPATIENT)
Dept: NURSING | Facility: CLINIC | Age: 55
End: 2020-10-02
Payer: COMMERCIAL

## 2020-10-02 DIAGNOSIS — Z48.02 ENCOUNTER FOR REMOVAL OF SUTURES: Primary | ICD-10-CM

## 2020-10-02 NOTE — PROGRESS NOTES
Suture removal:     Date sutures applied: 9/22/20         Where (setting) in which they applied:ER visit    Description:  Type: sutures x 6  Location: L Knee    History:    Cause of laceration: fall    Accompanying Signs & Symptoms: (staff: if yes-describe)  Redness: YES- minimal inflammation  Warmth: no  Drainage: no  Still bleeding: no  Fevers: no    Last tetanus shot: last tetanus booster within 10 years    6 sutures located on left knee, laceration was clean and approximated without signs of infection. All 6 sutures were easily removed without incident. Pt advised of wound cares and need to return Patient stated an understanding and agreed with plan.      Darwin URRUTIA RN   [facility]

## 2020-10-12 ENCOUNTER — MYC MEDICAL ADVICE (OUTPATIENT)
Dept: INTERNAL MEDICINE | Facility: CLINIC | Age: 55
End: 2020-10-12

## 2021-03-16 ENCOUNTER — MYC MEDICAL ADVICE (OUTPATIENT)
Dept: INTERNAL MEDICINE | Facility: CLINIC | Age: 56
End: 2021-03-16

## 2021-03-19 ENCOUNTER — IMMUNIZATION (OUTPATIENT)
Dept: NURSING | Facility: CLINIC | Age: 56
End: 2021-03-19
Payer: COMMERCIAL

## 2021-03-19 PROCEDURE — 0001A PR COVID VAC PFIZER DIL RECON 30 MCG/0.3 ML IM: CPT

## 2021-03-19 PROCEDURE — 91300 PR COVID VAC PFIZER DIL RECON 30 MCG/0.3 ML IM: CPT

## 2021-04-09 ENCOUNTER — IMMUNIZATION (OUTPATIENT)
Dept: NURSING | Facility: CLINIC | Age: 56
End: 2021-04-09
Attending: NURSE PRACTITIONER
Payer: COMMERCIAL

## 2021-04-09 PROCEDURE — 0002A PR COVID VAC PFIZER DIL RECON 30 MCG/0.3 ML IM: CPT

## 2021-04-09 PROCEDURE — 91300 PR COVID VAC PFIZER DIL RECON 30 MCG/0.3 ML IM: CPT

## 2021-05-20 ENCOUNTER — OFFICE VISIT (OUTPATIENT)
Dept: FAMILY MEDICINE | Facility: CLINIC | Age: 56
End: 2021-05-20
Payer: COMMERCIAL

## 2021-05-20 VITALS
WEIGHT: 202 LBS | SYSTOLIC BLOOD PRESSURE: 118 MMHG | HEART RATE: 71 BPM | BODY MASS INDEX: 30.62 KG/M2 | TEMPERATURE: 97.1 F | RESPIRATION RATE: 20 BRPM | DIASTOLIC BLOOD PRESSURE: 80 MMHG | HEIGHT: 68 IN | OXYGEN SATURATION: 98 %

## 2021-05-20 DIAGNOSIS — L30.9 DERMATITIS: ICD-10-CM

## 2021-05-20 DIAGNOSIS — B35.1 FUNGAL NAIL INFECTION: ICD-10-CM

## 2021-05-20 DIAGNOSIS — Z51.81 MEDICATION MONITORING ENCOUNTER: ICD-10-CM

## 2021-05-20 DIAGNOSIS — L30.9 ECZEMA, UNSPECIFIED TYPE: Primary | ICD-10-CM

## 2021-05-20 DIAGNOSIS — B07.8 COMMON WART: ICD-10-CM

## 2021-05-20 PROCEDURE — 17110 DESTRUCTION B9 LES UP TO 14: CPT | Performed by: FAMILY MEDICINE

## 2021-05-20 PROCEDURE — 99213 OFFICE O/P EST LOW 20 MIN: CPT | Mod: 25 | Performed by: FAMILY MEDICINE

## 2021-05-20 RX ORDER — TRIAMCINOLONE ACETONIDE 1 MG/G
CREAM TOPICAL 2 TIMES DAILY
Qty: 80 G | Refills: 1 | Status: SHIPPED | OUTPATIENT
Start: 2021-05-20 | End: 2021-09-27

## 2021-05-20 ASSESSMENT — MIFFLIN-ST. JEOR: SCORE: 1569.77

## 2021-05-20 NOTE — PROGRESS NOTES
"  Assessment & Plan     ICD-10-CM    1. Eczema, unspecified type  L30.9 triamcinolone (KENALOG) 0.1 % external cream   2. Fungal nail infection  B35.1    3. Dermatitis  L30.9    4. Common wart  B07.8 DESTRUCT BENIGN LESION, UP TO 14   5. Medication monitoring encounter  Z51.81        Discussed treatment/modality options, including risk and benefits, she desires:    1) no ceruman - hydrocortisone 1% prn itch to ear canals    2) eczema - triamcinolone - good moisturizing cream    3) wart - cryo    4) fungal nail - lamisil    All diagnosis above reviewed and noted above, otherwise stable.      See [facility] orders for further details.        BMI:   Estimated body mass index is 30.71 kg/m  as calculated from the following:    Height as of this encounter: 1.727 m (5' 8\").    Weight as of this encounter: 91.6 kg (202 lb).   Weight management plan: diet and exercise       Return in about 2 weeks (around 6/3/2021) for Follow Up Acute, Follow Up Chronic, and as needed.    No LOS data to display    Doing chart review, history and exam, documentation and further activities as noted.           Paulo Arriola MD, FAAFP     [facility]  [address] [place] [address] [id number] [place], [place] [email or web address]   Office: [phone]  Fax: [phone]  Pager: [phone]       Christopher Hirsch is a 55 year old who presents for the following health issues    Bilateral ears plugged/itchy - right ear is worst    Wart on right middle finger - dorsal DIP 2-3 mm - superficial    Toe fungus - left second toe - long term - thickened    Eczema on left>right hand - using hand  frequently - dry rough red    Review of Systems   CONSTITUTIONAL: NEGATIVE for fever, chills, change in weight  INTEGUMENTARY/SKIN: NEGATIVE for worrisome rashes, moles or lesions  EYES: NEGATIVE for vision changes or irritation  ENT/MOUTH: NEGATIVE for ear, mouth and throat problems  RESP: " "NEGATIVE for significant cough or SOB  CV: NEGATIVE for chest pain, palpitations or peripheral edema  GI: NEGATIVE for nausea, abdominal pain, heartburn, or change in bowel habits  : NEGATIVE for frequency, dysuria, or hematuria  MUSCULOSKELETAL: NEGATIVE for significant arthralgias or myalgia  NEURO: NEGATIVE for weakness, dizziness or paresthesias  ENDOCRINE: NEGATIVE for temperature intolerance, skin/hair changes  HEME: NEGATIVE for bleeding problems  PSYCHIATRIC: NEGATIVE for changes in mood or affect      Objective    /80   Pulse 71   Temp 97.1  F (36.2  C) (Tympanic)   Resp 20   Ht 1.727 m (5' 8\")   Wt 91.6 kg (202 lb)   LMP 10/29/2011   SpO2 98%   BMI 30.71 kg/m    Body mass index is 30.71 kg/m .  Physical Exam   GENERAL: healthy, alert and no distress  EYES: Eyes grossly normal to inspection, PERRL and conjunctivae and sclerae normal  HENT: ear canals and TM's normal, nose and mouth without ulcers or lesions  NECK: no adenopathy, no asymmetry, masses, or scars and thyroid normal to palpation  RESP: lungs clear to auscultation - no rales, rhonchi or wheezes  CV: regular rate and rhythm, normal S1 S2, no S3 or S4, no murmur, click or rub, no peripheral edema and peripheral pulses strong  ABDOMEN: soft, nontender, no hepatosplenomegaly, no masses and bowel sounds normal  MS: no gross musculoskeletal defects noted, no edema  SKIN: see above  NEURO: Normal strength and tone, mentation intact and speech normal  PSYCH: mentation appears normal, affect normal/bright    Cryo freeze/thaw < 1 minute        "

## 2021-06-02 ENCOUNTER — OFFICE VISIT (OUTPATIENT)
Dept: FAMILY MEDICINE | Facility: CLINIC | Age: 56
End: 2021-06-02
Payer: COMMERCIAL

## 2021-06-02 VITALS
RESPIRATION RATE: 14 BRPM | WEIGHT: 202 LBS | DIASTOLIC BLOOD PRESSURE: 78 MMHG | HEART RATE: 80 BPM | TEMPERATURE: 97.3 F | OXYGEN SATURATION: 97 % | SYSTOLIC BLOOD PRESSURE: 128 MMHG | HEIGHT: 68 IN | BODY MASS INDEX: 30.62 KG/M2

## 2021-06-02 DIAGNOSIS — L30.9 ECZEMA, UNSPECIFIED TYPE: ICD-10-CM

## 2021-06-02 DIAGNOSIS — I10 BENIGN ESSENTIAL HYPERTENSION: ICD-10-CM

## 2021-06-02 DIAGNOSIS — L30.9 DERMATITIS: ICD-10-CM

## 2021-06-02 DIAGNOSIS — B07.8 COMMON WART: Primary | ICD-10-CM

## 2021-06-02 DIAGNOSIS — Z51.81 MEDICATION MONITORING ENCOUNTER: ICD-10-CM

## 2021-06-02 PROCEDURE — 17110 DESTRUCTION B9 LES UP TO 14: CPT | Performed by: FAMILY MEDICINE

## 2021-06-02 ASSESSMENT — MIFFLIN-ST. JEOR: SCORE: 1569.77

## 2021-06-02 NOTE — PROGRESS NOTES
Assessment & Plan       ICD-10-CM    1. Common wart  B07.8 DESTRUCT BENIGN LESION, UP TO 14   2. Eczema, unspecified type  L30.9    3. Dermatitis  L30.9    4. Benign essential hypertension  I10    5. Medication monitoring encounter  Z51.81        Discussed treatment/modality options, including risk and benefits, she desires:    1) warts x 2 retreated with light cryo, OTC options reviewed    2) CPX in 9/2021 with Dr Early    All diagnosis above reviewed and noted above, otherwise stable.      See [facility] orders for further details.      No follow-ups on file.    No LOS data to display    Doing chart review, history and exam, documentation and further activities as noted.           Paulo Arriola MD, FAAFP     [facility]  [address] [place] [id number]  [email or web address]   Office: [phone]  Fax: [phone]  Pager: [phone]     Christopher Hirsch is a 55 year old who presents for the following health issues : follow up.    6/2/2021    Rt dorsal middle finger - resolving skin lesions, probable very small warts, recent cryo with good improvement    5/20/2021    Bilateral ears plugged/itchy - right ear is worst     Wart on right middle finger - dorsal DIP 2-3 mm - superficial     Toe fungus - left second toe - long term - thickened     Eczema on left>right hand - using hand  frequently - dry rough red    Review of Systems   CONSTITUTIONAL: NEGATIVE for fever, chills, change in weight  INTEGUMENTARY/SKIN: NEGATIVE for worrisome rashes, moles or lesions  EYES: NEGATIVE for vision changes or irritation  ENT/MOUTH: NEGATIVE for ear, mouth and throat problems  RESP: NEGATIVE for significant cough or SOB  CV: NEGATIVE for chest pain, palpitations or peripheral edema  GI: NEGATIVE for nausea, abdominal pain, heartburn, or change in bowel habits  : NEGATIVE for frequency, dysuria, or hematuria  MUSCULOSKELETAL:  "NEGATIVE for significant arthralgias or myalgia  NEURO: NEGATIVE for weakness, dizziness or paresthesias  ENDOCRINE: NEGATIVE for temperature intolerance, skin/hair changes  HEME: NEGATIVE for bleeding problems  PSYCHIATRIC: NEGATIVE for changes in mood or affect      Objective    /78   Pulse 80   Temp 97.3  F (36.3  C) (Oral)   Resp 14   Ht 1.727 m (5' 8\")   Wt 91.6 kg (202 lb)   LMP 10/29/2011 (Exact Date)   SpO2 97%   Breastfeeding No   BMI 30.71 kg/m    Body mass index is 30.71 kg/m .  Physical Exam   GENERAL: healthy, alert and no distress  EYES: Eyes grossly normal to inspection, PERRL and conjunctivae and sclerae normal  RESP: lungs clear to auscultation - no rales, rhonchi or wheezes  CV: regular rate and rhythm, normal S1 S2, no S3 or S4, no murmur, click or rub, no peripheral edema and peripheral pulses strong  MS: no gross musculoskeletal defects noted, no edema  SKIN: as above  PSYCH: mentation appears normal, affect normal/bright    Lesions x 2, as above, treated with cryo, light freeze/thaw< 1 minute          "

## 2021-09-05 ENCOUNTER — HEALTH MAINTENANCE LETTER (OUTPATIENT)
Age: 56
End: 2021-09-05

## 2021-09-24 ASSESSMENT — ENCOUNTER SYMPTOMS
SORE THROAT: 0
FEVER: 0
NERVOUS/ANXIOUS: 0
CHILLS: 0
FREQUENCY: 0
ARTHRALGIAS: 0
DIARRHEA: 0
COUGH: 0
HEADACHES: 0
PALPITATIONS: 0
ABDOMINAL PAIN: 0
CONSTIPATION: 0
BREAST MASS: 0
NAUSEA: 0
DIZZINESS: 0
JOINT SWELLING: 0
MYALGIAS: 0
DYSURIA: 0
PARESTHESIAS: 0
SHORTNESS OF BREATH: 0
HEMATOCHEZIA: 0
HEMATURIA: 0
EYE PAIN: 0
WEAKNESS: 0
HEARTBURN: 0

## 2021-09-27 ENCOUNTER — OFFICE VISIT (OUTPATIENT)
Dept: INTERNAL MEDICINE | Facility: CLINIC | Age: 56
End: 2021-09-27
Payer: COMMERCIAL

## 2021-09-27 VITALS
RESPIRATION RATE: 20 BRPM | SYSTOLIC BLOOD PRESSURE: 116 MMHG | DIASTOLIC BLOOD PRESSURE: 88 MMHG | HEIGHT: 68 IN | OXYGEN SATURATION: 98 % | BODY MASS INDEX: 30.71 KG/M2 | HEART RATE: 80 BPM | TEMPERATURE: 97.9 F | WEIGHT: 202.6 LBS

## 2021-09-27 DIAGNOSIS — K76.0 FATTY LIVER: ICD-10-CM

## 2021-09-27 DIAGNOSIS — I10 BENIGN ESSENTIAL HYPERTENSION: ICD-10-CM

## 2021-09-27 DIAGNOSIS — Z00.00 ENCOUNTER FOR ROUTINE ADULT HEALTH EXAMINATION WITHOUT ABNORMAL FINDINGS: Primary | ICD-10-CM

## 2021-09-27 DIAGNOSIS — Z13.6 CARDIOVASCULAR SCREENING; LDL GOAL LESS THAN 130: ICD-10-CM

## 2021-09-27 DIAGNOSIS — L30.9 ECZEMA, UNSPECIFIED TYPE: ICD-10-CM

## 2021-09-27 LAB
CHOLEST SERPL-MCNC: 184 MG/DL
CREAT UR-MCNC: 111 MG/DL
FASTING STATUS PATIENT QL REPORTED: YES
HDLC SERPL-MCNC: 38 MG/DL
LDLC SERPL CALC-MCNC: 129 MG/DL
MICROALBUMIN UR-MCNC: 22 MG/L
MICROALBUMIN/CREAT UR: 19.82 MG/G CR (ref 0–25)
NONHDLC SERPL-MCNC: 146 MG/DL
TRIGL SERPL-MCNC: 85 MG/DL

## 2021-09-27 PROCEDURE — 99396 PREV VISIT EST AGE 40-64: CPT | Mod: 25 | Performed by: INTERNAL MEDICINE

## 2021-09-27 PROCEDURE — 90471 IMMUNIZATION ADMIN: CPT | Performed by: INTERNAL MEDICINE

## 2021-09-27 PROCEDURE — 80061 LIPID PANEL: CPT | Performed by: INTERNAL MEDICINE

## 2021-09-27 PROCEDURE — 36415 COLL VENOUS BLD VENIPUNCTURE: CPT | Performed by: INTERNAL MEDICINE

## 2021-09-27 PROCEDURE — 90682 RIV4 VACC RECOMBINANT DNA IM: CPT | Performed by: INTERNAL MEDICINE

## 2021-09-27 PROCEDURE — 82043 UR ALBUMIN QUANTITATIVE: CPT | Performed by: INTERNAL MEDICINE

## 2021-09-27 PROCEDURE — 99213 OFFICE O/P EST LOW 20 MIN: CPT | Mod: 25 | Performed by: INTERNAL MEDICINE

## 2021-09-27 PROCEDURE — 80053 COMPREHEN METABOLIC PANEL: CPT | Performed by: INTERNAL MEDICINE

## 2021-09-27 ASSESSMENT — ENCOUNTER SYMPTOMS
NAUSEA: 0
JOINT SWELLING: 0
HEADACHES: 0
NERVOUS/ANXIOUS: 0
CHILLS: 0
PARESTHESIAS: 0
SORE THROAT: 0
BREAST MASS: 0
SHORTNESS OF BREATH: 0
EYE PAIN: 0
HEMATURIA: 0
MYALGIAS: 0
DIZZINESS: 0
PALPITATIONS: 0
COUGH: 0
HEARTBURN: 0
WEAKNESS: 0
FEVER: 0
DYSURIA: 0
CONSTIPATION: 0
ARTHRALGIAS: 0
FREQUENCY: 0
ABDOMINAL PAIN: 0
HEMATOCHEZIA: 0
DIARRHEA: 0

## 2021-09-27 ASSESSMENT — MIFFLIN-ST. JEOR: SCORE: 1567.49

## 2021-09-27 NOTE — PROGRESS NOTES
SUBJECTIVE:   CC: Joycelyn Laird is an 56 year old woman who presents for preventive health visit.       Patient has been advised of split billing requirements and indicates understanding: Yes  Healthy Habits:     Getting at least 3 servings of Calcium per day:  Yes    Bi-annual eye exam:  Yes    Dental care twice a year:  Yes    Sleep apnea or symptoms of sleep apnea:  None    Diet:  Regular (no restrictions)    Frequency of exercise:  4-5 days/week    Duration of exercise:  30-45 minutes    Taking medications regularly:  No    Medication side effects:  None    PHQ-2 Total Score: 0    Additional concerns today:  Yes    Ability to successfully perform activities of daily living: Yes, no assistance needed  Home safety:  none identified   Hearing impairment: None     Problems:  1. Hypertension: She remains off of medication, blood pressure readings recently 123/92, 116/88, 109/83, 122/84, 121/86, 105/81.  She had lost a little bit of weight with some decreased but weight has gone back up a little.  2.  Eczema: Has had more trouble with her hands past year due to using more hand  and washing more frequently.  She uses a little hydrocortisone in her ear, uses Nizoral shampoo for itchy scalp.  Feels like she is managing okay    Other concerns:  1.  Has some questions regarding CBD oil to help with anxiety or sleep  2.  Has questions regarding Covid, boosters.        Today's PHQ-2 Score:   PHQ-2 ( 1999 Pfizer) 9/24/2021   Q1: Little interest or pleasure in doing things 0   Q2: Feeling down, depressed or hopeless 0   PHQ-2 Score 0   Q1: Little interest or pleasure in doing things Not at all   Q2: Feeling down, depressed or hopeless Not at all   PHQ-2 Score 0       Abuse: Current or Past (Physical, Sexual or Emotional) - No  Do you feel safe in your environment? Yes        Social History     Tobacco Use     Smoking status: Never Smoker     Smokeless tobacco: Never Used   Substance Use Topics     Alcohol use: Yes      Alcohol/week: 0.0 - 1.0 standard drinks     If you drink alcohol do you typically have >3 drinks per day or >7 drinks per week? No    Alcohol Use 9/24/2021   Prescreen: >3 drinks/day or >7 drinks/week? No   Prescreen: >3 drinks/day or >7 drinks/week? -   No flowsheet data found.    Reviewed orders with patient.  Reviewed health maintenance and updated orders accordingly - Yes      Breast Cancer Screening:  Any new diagnosis of family breast, ovarian, or bowel cancer? No    FHS-7: No flowsheet data found.  click delete button to remove this line now  Mammogram Screening: Recommended mammography every 1-2 years with patient discussion and risk factor consideration  Pertinent mammograms are reviewed under the imaging tab.    History of abnormal Pap smear: NO - age 30-65 PAP every 5 years with negative HPV co-testing recommended  PAP / HPV Latest Ref Rng & Units 9/9/2019 7/18/2016 3/29/2013   PAP (Historical) - NIL NIL NIL   HPV16 NEG:Negative Negative - -   HPV18 NEG:Negative Negative - -   HRHPV NEG:Negative Negative - -     Reviewed and updated as needed this visit by clinical staff                 Patient Active Problem List   Diagnosis     Fatty liver     Eczema     CARDIOVASCULAR SCREENING; LDL GOAL LESS THAN 130     Glaucoma     Benign essential hypertension     Current Outpatient Medications   Medication Sig Dispense Refill     calcium carbonate (OS-RAINER) 500 MG tablet Take 1 tablet by mouth 2 times daily Patient is taking one tablet three times a week       fish oil-omega-3 fatty acids (OMEGA 3) 1000 MG capsule Take 1 g by mouth every other day  90 capsule 3     Flaxseed, Linseed, (FLAXSEED OIL) 1200 MG CAPS Take by mouth every other day        fluocinonide (LIDEX) 0.05 % cream Apply small amount to affected area bid as needed 30 g 2     latanoprost (XALATAN) 0.005 % ophthalmic solution Place 1 drop into both eyes daily       MULTIVITAMIN TABS   OR         Reviewed and updated as needed this visit by  "Provider                    Review of Systems   Constitutional: Negative for chills and fever.   HENT: Negative for congestion, ear pain, hearing loss and sore throat.    Eyes: Negative for pain and visual disturbance.   Respiratory: Negative for cough and shortness of breath.    Cardiovascular: Negative for chest pain, palpitations and peripheral edema.   Gastrointestinal: Negative for abdominal pain, constipation, diarrhea, heartburn, hematochezia and nausea.   Breasts:  Negative for tenderness, breast mass and discharge.   Genitourinary: Negative for dysuria, frequency, genital sores, hematuria, pelvic pain, urgency, vaginal bleeding and vaginal discharge.   Musculoskeletal: Negative for arthralgias, joint swelling and myalgias.   Skin: Negative for rash.   Neurological: Negative for dizziness, weakness, headaches and paresthesias.   Psychiatric/Behavioral: Negative for mood changes. The patient is not nervous/anxious.           OBJECTIVE:   /88 (BP Location: Left arm, Patient Position: Sitting, Cuff Size: Adult Large)   Pulse 80   Temp 97.9  F (36.6  C) (Oral)   Resp 20   Ht 1.727 m (5' 8\")   Wt 91.9 kg (202 lb 9.6 oz)   LMP 10/29/2011 (Exact Date)   SpO2 98%   BMI 30.81 kg/m    Physical Exam      HEENT: extraocular movements are intact, pupils equal and reactive to light and accommodation, TMs clear  NECK: Neck supple. No adenopathy. Thyroid symmetric, normal size,, Carotids without bruits.  PULMONARY: clear to auscultation  CARDIAC: regular rate and rhythm and no murmurs, clicks, or gallops  PULSES: 2/2 throughout  BACK: no spinal or CVAT  ABDOMINAL: Soft, nontender.  Normal bowel sounds.  No hepatosplenomegaly or abnormal masses  BREAST: No breast masses or tenderness, No axillary masses or tenderness and No galactorrhea  REFLEXES: 1+ throughout  SKIN: Mild eczematous changes on the hands           ASSESSMENT/PLAN:   (Z00.00) Encounter for routine adult health examination without abnormal " "findings  (primary encounter diagnosis)  Comment: up to date  Plan: Answered questions regarding CBD, Covid    (I10) Benign essential hypertension  Comment: Most blood pressures at home are okay, rarely above 90 diastolic.  Continue to monitor, low-salt diet, check lab  Plan: Albumin Random Urine Quantitative with Creat         Ratio, Comprehensive metabolic panel (BMP +         Alb, Alk Phos, ALT, AST, Total. Bili, TP)            (K76.0) Fatty liver  Comment: Recheck labPlan: Comprehensive metabolic panel (BMP + Alb, Alk         Phos, ALT, AST, Total. Bili, TP)            (Z13.6) CARDIOVASCULAR SCREENING; LDL GOAL LESS THAN 130  Comment:   Plan: Lipid panel reflex to direct LDL Fasting            (L30.9) Eczema, unspecified type  Comment: Continue using hydrocortisone, Lidex for the hands when in bed  Plan:     Patient has been advised of split billing requirements and indicates understanding: Yes  COUNSELING:  Reviewed preventive health counseling, as reflected in patient instructions    Estimated body mass index is 30.71 kg/m  as calculated from the following:    Height as of 6/2/21: 1.727 m (5' 8\").    Weight as of 6/2/21: 91.6 kg (202 lb).    Weight management plan: Discussed healthy diet and exercise guidelines    She reports that she has never smoked. She has never used smokeless tobacco.      Counseling Resources:  ATP IV Guidelines  Pooled Cohorts Equation Calculator  Breast Cancer Risk Calculator  BRCA-Related Cancer Risk Assessment: FHS-7 Tool  FRAX Risk Assessment  ICSI Preventive Guidelines  Dietary Guidelines for Americans, 2010  USDA's MyPlate  ASA Prophylaxis  Lung CA Screening    Gauri Early MD  [facility]  "

## 2021-09-27 NOTE — NURSING NOTE
"BP (!) 174/94 (BP Location: Left arm, Patient Position: Sitting, Cuff Size: Adult Large)   Pulse 80   Temp 97.9  F (36.6  C) (Oral)   Resp 20   Ht 1.727 m (5' 8\")   Wt 91.9 kg (202 lb 9.6 oz)   LMP 10/29/2011 (Exact Date)   SpO2 98%   BMI 30.81 kg/m      "

## 2021-10-06 LAB
ALBUMIN SERPL-MCNC: 4 G/DL (ref 3.4–5)
ALP SERPL-CCNC: 52 U/L (ref 40–150)
ALT SERPL W P-5'-P-CCNC: 26 U/L (ref 0–50)
ANION GAP SERPL CALCULATED.3IONS-SCNC: 3 MMOL/L (ref 3–14)
AST SERPL W P-5'-P-CCNC: 23 U/L (ref 0–45)
BILIRUB SERPL-MCNC: 0.7 MG/DL (ref 0.2–1.3)
BUN SERPL-MCNC: 11 MG/DL (ref 7–30)
CALCIUM SERPL-MCNC: 9.3 MG/DL (ref 8.5–10.1)
CHLORIDE BLD-SCNC: 107 MMOL/L (ref 94–109)
CO2 SERPL-SCNC: 27 MMOL/L (ref 20–32)
CREAT SERPL-MCNC: 0.67 MG/DL (ref 0.52–1.04)
GFR SERPL CREATININE-BSD FRML MDRD: >90 ML/MIN/1.73M2
GLUCOSE BLD-MCNC: 107 MG/DL (ref 70–99)
POTASSIUM BLD-SCNC: 4.2 MMOL/L (ref 3.4–5.3)
PROT SERPL-MCNC: 7.9 G/DL (ref 6.8–8.8)
SODIUM SERPL-SCNC: 137 MMOL/L (ref 133–144)

## 2021-11-05 ENCOUNTER — TELEPHONE (OUTPATIENT)
Dept: INTERNAL MEDICINE | Facility: CLINIC | Age: 56
End: 2021-11-05

## 2021-11-05 NOTE — TELEPHONE ENCOUNTER
The readings that had documented at the end of September were all good.  I would like to have her send some more readings.

## 2021-11-05 NOTE — TELEPHONE ENCOUNTER
Pt calls regarding elevated BP.     204/115. Was the highest last night.     Has anxiety and upset stomach.     Recheck of BP last night 162/90     /93 this AM at 10 am.     /108 in early October.     She can send some readings if Dr Early prefers.     No other symptoms.     Lisinopril caused a cough before. Never started Losartan. BP was doing fine then.     Please advise.     BP Readings from Last 3 Encounters:   09/27/21 116/88   06/02/21 128/78   05/20/21 118/80

## 2021-11-09 ENCOUNTER — MYC MEDICAL ADVICE (OUTPATIENT)
Dept: INTERNAL MEDICINE | Facility: CLINIC | Age: 56
End: 2021-11-09

## 2021-11-09 ENCOUNTER — E-VISIT (OUTPATIENT)
Dept: INTERNAL MEDICINE | Facility: CLINIC | Age: 56
End: 2021-11-09
Payer: COMMERCIAL

## 2021-11-09 DIAGNOSIS — R30.0 DYSURIA: ICD-10-CM

## 2021-11-09 DIAGNOSIS — N30.00 ACUTE CYSTITIS WITHOUT HEMATURIA: Primary | ICD-10-CM

## 2021-11-09 PROCEDURE — 99421 OL DIG E/M SVC 5-10 MIN: CPT | Performed by: INTERNAL MEDICINE

## 2021-11-09 RX ORDER — NITROFURANTOIN 25; 75 MG/1; MG/1
100 CAPSULE ORAL 2 TIMES DAILY
Qty: 14 CAPSULE | Refills: 0 | Status: SHIPPED | OUTPATIENT
Start: 2021-11-09 | End: 2022-01-02

## 2021-11-09 NOTE — PATIENT INSTRUCTIONS
Dear Joycelyn Laird    After reviewing your responses, I've been able to diagnose you with a urinary tract infection, which is a common infection of the bladder with bacteria.  This is not a sexually transmitted infection, though urinating immediately after intercourse can help prevent infections.  Drinking lots of fluids is also helpful to clear your current infection and prevent the next one.      I have sent a prescription for antibiotics to your pharmacy to treat this infection.    It is important that you take all of your prescribed medication even if your symptoms are improving after a few doses.  Taking all of your medicine helps prevent the symptoms from returning.     If your symptoms worsen, you develop pain in your back or stomach, develop fevers, or are not improving in 5 days, please contact your primary care provider for an appointment or visit any of our convenient Walk-in or Urgent Care Centers to be seen, which can be found on our website here.    Thanks again for choosing us as your health care partner,    Gauri Early MD

## 2021-12-06 ENCOUNTER — IMMUNIZATION (OUTPATIENT)
Dept: NURSING | Facility: CLINIC | Age: 56
End: 2021-12-06
Payer: COMMERCIAL

## 2021-12-06 DIAGNOSIS — Z23 HIGH PRIORITY FOR 2019-NCOV VACCINE: Primary | ICD-10-CM

## 2021-12-06 PROCEDURE — 91300 COVID-19,PF,PFIZER (12+ YRS): CPT

## 2021-12-06 PROCEDURE — 99207 PR NO CHARGE LOS: CPT

## 2021-12-06 PROCEDURE — 0004A COVID-19,PF,PFIZER (12+ YRS): CPT

## 2021-12-13 ENCOUNTER — OFFICE VISIT (OUTPATIENT)
Dept: FAMILY MEDICINE | Facility: CLINIC | Age: 56
End: 2021-12-13
Payer: COMMERCIAL

## 2021-12-13 VITALS
SYSTOLIC BLOOD PRESSURE: 180 MMHG | OXYGEN SATURATION: 99 % | BODY MASS INDEX: 29.4 KG/M2 | TEMPERATURE: 97.9 F | HEART RATE: 71 BPM | RESPIRATION RATE: 12 BRPM | DIASTOLIC BLOOD PRESSURE: 92 MMHG | WEIGHT: 194 LBS | HEIGHT: 68 IN

## 2021-12-13 DIAGNOSIS — E78.5 HYPERLIPIDEMIA LDL GOAL <130: ICD-10-CM

## 2021-12-13 DIAGNOSIS — Z51.81 MEDICATION MONITORING ENCOUNTER: ICD-10-CM

## 2021-12-13 DIAGNOSIS — Z12.11 SCREEN FOR COLON CANCER: ICD-10-CM

## 2021-12-13 DIAGNOSIS — I10 BENIGN ESSENTIAL HYPERTENSION: Primary | ICD-10-CM

## 2021-12-13 DIAGNOSIS — Z12.31 ENCOUNTER FOR SCREENING MAMMOGRAM FOR MALIGNANT NEOPLASM OF BREAST: ICD-10-CM

## 2021-12-13 PROCEDURE — 99214 OFFICE O/P EST MOD 30 MIN: CPT | Performed by: FAMILY MEDICINE

## 2021-12-13 RX ORDER — IRBESARTAN AND HYDROCHLOROTHIAZIDE 150; 12.5 MG/1; MG/1
1 TABLET, FILM COATED ORAL DAILY
Qty: 90 TABLET | Refills: 3 | Status: SHIPPED | OUTPATIENT
Start: 2021-12-13 | End: 2022-07-22

## 2021-12-13 ASSESSMENT — MIFFLIN-ST. JEOR: SCORE: 1528.48

## 2021-12-13 ASSESSMENT — PAIN SCALES - GENERAL: PAINLEVEL: NO PAIN (0)

## 2021-12-13 NOTE — PROGRESS NOTES
Assessment & Plan       ICD-10-CM    1. Benign essential hypertension  I10 REVIEW OF HEALTH MAINTENANCE PROTOCOL ORDERS     Comprehensive metabolic panel     UA reflex to Microscopic and Culture     Albumin Random Urine Quantitative with Creat Ratio     TSH with free T4 reflex     irbesartan-hydrochlorothiazide (AVALIDE) 150-12.5 MG tablet   2. Hyperlipidemia LDL goal <130  E78.5 Comprehensive metabolic panel     Lipid panel reflex to direct LDL Fasting     CK total   3. Encounter for screening mammogram for malignant neoplasm of breast  Z12.31 REVIEW OF HEALTH MAINTENANCE PROTOCOL ORDERS     MA SCREENING DIGITAL BILAT - Future  (s+30)   4. Screen for colon cancer  Z12.11 Fecal colorectal cancer screen FIT   5. Medication monitoring encounter  Z51.81 REVIEW OF HEALTH MAINTENANCE PROTOCOL ORDERS     Comprehensive metabolic panel     Lipid panel reflex to direct LDL Fasting     CBC with platelets     CK total     UA reflex to Microscopic and Culture     Albumin Random Urine Quantitative with Creat Ratio     TSH with free T4 reflex     Fecal colorectal cancer screen FIT       Discussed treatment/modality options, including risk and benefits, she desires:    1) colonoscopy due 12/2022    2) mammogram ordered    3) fasting labs soon    4) irbesartan/hctz - 150/12.5 one daily    5) skin 1/10/2022 - Derm    All diagnosis above reviewed and noted above, otherwise stable.      See [employer] orders for further details.      Return in about 3 weeks (around 1/3/2022) for Medication Recheck Visit, Follow Up Chronic.    No LOS data to display    Doing chart review, history and exam, documentation and further activities as noted.           Paulo Arriola MD, FAAFP     [facility]  [address] [place] [id number]  [email or web address]   Office: [phone]  Fax: [phone]  Pager: [phone]       Christopher Hirsch is a 56 year old who  presents for the following health issues     History of Present Illness       Hypertension: She presents for follow up of hypertension.  She does check blood pressure  regularly outside of the clinic. Outside blood pressures have been over 140/90. She follows a low salt diet.     She eats 0-1 servings of fruits and vegetables daily.She consumes 0 sweetened beverage(s) daily.She exercises with enough effort to increase her heart rate 20 to 29 minutes per day.  She exercises with enough effort to increase her heart rate 4 days per week.   She is taking medications regularly.     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     Review of Systems   CONSTITUTIONAL: NEGATIVE for fever, chills, change in weight  INTEGUMENTARY/SKIN: NEGATIVE for worrisome rashes, moles or lesions  EYES: NEGATIVE for vision changes or irritation  ENT/MOUTH: NEGATIVE for ear, mouth and throat problems  RESP: NEGATIVE for significant cough or SOB  CV: NEGATIVE for chest pain, palpitations or peripheral edema  GI: NEGATIVE for nausea, abdominal pain, heartburn, or change in bowel habits  : NEGATIVE for frequency, dysuria, or  "hematuria  MUSCULOSKELETAL: NEGATIVE for significant arthralgias or myalgia  NEURO: NEGATIVE for weakness, dizziness or paresthesias  ENDOCRINE: NEGATIVE for temperature intolerance, skin/hair changes  HEME: NEGATIVE for bleeding problems  PSYCHIATRIC: NEGATIVE for changes in mood or affect      Objective    BP (!) 180/92   Pulse 71   Temp 97.9  F (36.6  C) (Tympanic)   Resp 12   Ht 1.727 m (5' 8\")   Wt 88 kg (194 lb)   LMP 10/29/2011 (Exact Date)   SpO2 99%   BMI 29.50 kg/m    Body mass index is 29.5 kg/m .  Physical Exam   GENERAL: healthy, alert and no distress  EYES: Eyes grossly normal to inspection, PERRL and conjunctivae and sclerae normal  HENT: ear canals and TM's normal, nose and mouth without ulcers or lesions  NECK: no adenopathy, no asymmetry, masses, or scars and thyroid normal to palpation  RESP: lungs clear to auscultation - no rales, rhonchi or wheezes  CV: regular rate and rhythm, normal S1 S2, no S3 or S4, no murmur, click or rub, no peripheral edema and peripheral pulses strong  ABDOMEN: soft, nontender, no hepatosplenomegaly, no masses and bowel sounds normal  MS: no gross musculoskeletal defects noted, no edema  SKIN: no suspicious lesions or rashes  NEURO: Normal strength and tone, mentation intact and speech normal  PSYCH: mentation appears normal, affect normal/bright    Labs pending          "

## 2021-12-22 ENCOUNTER — TELEPHONE (OUTPATIENT)
Dept: FAMILY MEDICINE | Facility: CLINIC | Age: 56
End: 2021-12-22
Payer: COMMERCIAL

## 2021-12-22 NOTE — TELEPHONE ENCOUNTER
Patient blood pressure was 102/82 this morning and she is wondering if she needs to go down to half a pill each day instead of a whole pill.  Please call patient on her mobile number, she is traveling so okay to leave a detailed message if she does not answer       Linda Marie     i

## 2021-12-22 NOTE — TELEPHONE ENCOUNTER
Ok to try 1/2 tab every am and watch BP    BP Readings from Last 3 Encounters:   12/13/21 (!) 180/92   09/27/21 116/88   06/02/21 128/78     Creatinine   Date Value Ref Range Status   09/27/2021 0.67 0.52 - 1.04 mg/dL Final   09/21/2020 0.70 0.52 - 1.04 mg/dL Final     GFR Estimate   Date Value Ref Range Status   09/27/2021 >90 >60 mL/min/1.73m2 Final     Comment:     As of July 11, 2021, eGFR is calculated by the CKD-EPI creatinine equation, without race adjustment. eGFR can be influenced by muscle mass, exercise, and diet. The reported eGFR is an estimation only and is only applicable if the renal function is stable.   09/21/2020 >90 >60 mL/min/[1.73_m2] Final     Comment:     Non  GFR Calc  Starting 12/18/2018, serum creatinine based estimated GFR (eGFR) will be   calculated using the Chronic Kidney Disease Epidemiology Collaboration   (CKD-EPI) equation.

## 2021-12-26 ENCOUNTER — HEALTH MAINTENANCE LETTER (OUTPATIENT)
Age: 56
End: 2021-12-26

## 2021-12-27 ENCOUNTER — NURSE TRIAGE (OUTPATIENT)
Dept: NURSING | Facility: CLINIC | Age: 56
End: 2021-12-27
Payer: COMMERCIAL

## 2021-12-27 NOTE — TELEPHONE ENCOUNTER
Patient returning a call from clinic regarding blood pressure. Patient recently started a new blood pressure medication - Avalide 150-12.5mg and was concerned about a low reading of 102/82 back on 12/22/21.    Patient has been keeping a journal of her Blood Pressure readings and they are as follows:    123/90  120/80  134/94  129/93  113/85    Patient messaged her primary physician who responded with the following message:        Patient does have a F/U visit with Dr. Arriola on 1/3/2021. RN/Writer advised patient continue to monitor her BP daily and call back with any concerns or symptoms.      Patient verbalized understanding and agreed with plan of care.    COVID 19 Nurse Triage Plan/Patient Instructions    Please be aware that novel coronavirus (COVID-19) may be circulating in the community. If you develop symptoms such as fever, cough, or SOB or if you have concerns about the presence of another infection including coronavirus (COVID-19), please contact your health care provider or visit [email or web address].     Disposition/Instructions    Home care recommended. Follow home care protocol based instructions.    Thank you for taking steps to prevent the spread of this virus.  o Limit your contact with others.  o Wear a simple mask to cover your cough.  o Wash your hands well and often.    Resources    [facility]: About COVID-19: [email or web address]    CDC: What to Do If You're Sick: www.cdc.gov/coronavirus/2019-ncov/about/steps-when-sick.html    CDC: Ending Home Isolation: www.cdc.gov/coronavirus/2019-ncov/hcp/disposition-in-home-patients.html     CDC: Caring for Someone: www.cdc.gov/coronavirus/2019-ncov/if-you-are-sick/care-for-someone.html     [facility]: Interim Guidance for Hospital Discharge to Home: www.health.[facility].[place]/diseases/coronavirus/hcp/hospdischarge.pdf    [facility] clinical trials (COVID-19 research studies): [email or web address]/umn-clinical-trials      Below are the COVID-19 hotlines at the [place] Department of Health ([facility]). Interpreters are available.   o For health questions: Call 238-375-9212 or 1-491.891.7358 (7 a.m. to 7 p.m.)  o For questions about schools and childcare: Call 642-431-7262 or 1-418.123.9757 (7 a.m. to 7 p.m.)                 Cande Nassar RN  12/27/21 2:24 PM  [facility] Nurse Advisor      Additional Information    [1] Follow-up call to recent contact AND [2] information only call, no triage required    Protocols used: INFORMATION ONLY CALL-A-

## 2021-12-27 NOTE — TELEPHONE ENCOUNTER
Attempt # 1    Called #   Telephone Information:   Mobile [phone]         Left a non detailed VM     Elisabet Parrish RN, BSN  [place] [facility]

## 2021-12-28 NOTE — TELEPHONE ENCOUNTER
Called #   Telephone Information:   Mobile [phone]     advised pt on the information below     Patient stated an understanding and agreed with plan.      Elisabet Parrish RN, BSN  [facility]

## 2021-12-31 ENCOUNTER — LAB (OUTPATIENT)
Dept: LAB | Facility: CLINIC | Age: 56
End: 2021-12-31
Payer: COMMERCIAL

## 2021-12-31 DIAGNOSIS — Z51.81 MEDICATION MONITORING ENCOUNTER: ICD-10-CM

## 2021-12-31 DIAGNOSIS — E78.5 HYPERLIPIDEMIA LDL GOAL <130: ICD-10-CM

## 2021-12-31 DIAGNOSIS — Z12.11 SCREEN FOR COLON CANCER: ICD-10-CM

## 2021-12-31 DIAGNOSIS — I10 BENIGN ESSENTIAL HYPERTENSION: ICD-10-CM

## 2021-12-31 LAB
ALBUMIN SERPL-MCNC: 3.8 G/DL (ref 3.4–5)
ALBUMIN UR-MCNC: NEGATIVE MG/DL
ALP SERPL-CCNC: 52 U/L (ref 40–150)
ALT SERPL W P-5'-P-CCNC: 25 U/L (ref 0–50)
ANION GAP SERPL CALCULATED.3IONS-SCNC: 5 MMOL/L (ref 3–14)
APPEARANCE UR: CLEAR
AST SERPL W P-5'-P-CCNC: 21 U/L (ref 0–45)
BILIRUB SERPL-MCNC: 0.7 MG/DL (ref 0.2–1.3)
BILIRUB UR QL STRIP: NEGATIVE
BUN SERPL-MCNC: 12 MG/DL (ref 7–30)
CALCIUM SERPL-MCNC: 8.8 MG/DL (ref 8.5–10.1)
CHLORIDE BLD-SCNC: 108 MMOL/L (ref 94–109)
CHOLEST SERPL-MCNC: 180 MG/DL
CK SERPL-CCNC: 165 U/L (ref 30–225)
CO2 SERPL-SCNC: 27 MMOL/L (ref 20–32)
COLOR UR AUTO: YELLOW
CREAT SERPL-MCNC: 0.66 MG/DL (ref 0.52–1.04)
CREAT UR-MCNC: 129 MG/DL
ERYTHROCYTE [DISTWIDTH] IN BLOOD BY AUTOMATED COUNT: 11.4 % (ref 10–15)
FASTING STATUS PATIENT QL REPORTED: YES
GFR SERPL CREATININE-BSD FRML MDRD: >90 ML/MIN/1.73M2
GLUCOSE BLD-MCNC: 102 MG/DL (ref 70–99)
GLUCOSE UR STRIP-MCNC: NEGATIVE MG/DL
HCT VFR BLD AUTO: 39.8 % (ref 35–47)
HDLC SERPL-MCNC: 53 MG/DL
HGB BLD-MCNC: 13.6 G/DL (ref 11.7–15.7)
HGB UR QL STRIP: NEGATIVE
KETONES UR STRIP-MCNC: NEGATIVE MG/DL
LDLC SERPL CALC-MCNC: 111 MG/DL
LEUKOCYTE ESTERASE UR QL STRIP: NEGATIVE
MCH RBC QN AUTO: 29.4 PG (ref 26.5–33)
MCHC RBC AUTO-ENTMCNC: 34.2 G/DL (ref 31.5–36.5)
MCV RBC AUTO: 86 FL (ref 78–100)
MICROALBUMIN UR-MCNC: 13 MG/L
MICROALBUMIN/CREAT UR: 10.08 MG/G CR (ref 0–25)
NITRATE UR QL: NEGATIVE
NONHDLC SERPL-MCNC: 127 MG/DL
PH UR STRIP: 6 [PH] (ref 5–7)
PLATELET # BLD AUTO: 221 10E3/UL (ref 150–450)
POTASSIUM BLD-SCNC: 4.2 MMOL/L (ref 3.4–5.3)
PROT SERPL-MCNC: 7.4 G/DL (ref 6.8–8.8)
RBC # BLD AUTO: 4.62 10E6/UL (ref 3.8–5.2)
SODIUM SERPL-SCNC: 140 MMOL/L (ref 133–144)
SP GR UR STRIP: >=1.03 (ref 1–1.03)
TRIGL SERPL-MCNC: 82 MG/DL
TSH SERPL DL<=0.005 MIU/L-ACNC: 2.57 MU/L (ref 0.4–4)
UROBILINOGEN UR STRIP-ACNC: 0.2 E.U./DL
WBC # BLD AUTO: 4.1 10E3/UL (ref 4–11)

## 2021-12-31 PROCEDURE — 82550 ASSAY OF CK (CPK): CPT

## 2021-12-31 PROCEDURE — 36415 COLL VENOUS BLD VENIPUNCTURE: CPT

## 2021-12-31 PROCEDURE — 81003 URINALYSIS AUTO W/O SCOPE: CPT

## 2021-12-31 PROCEDURE — 85027 COMPLETE CBC AUTOMATED: CPT

## 2021-12-31 PROCEDURE — 80053 COMPREHEN METABOLIC PANEL: CPT

## 2021-12-31 PROCEDURE — 82043 UR ALBUMIN QUANTITATIVE: CPT

## 2021-12-31 PROCEDURE — 84443 ASSAY THYROID STIM HORMONE: CPT

## 2021-12-31 PROCEDURE — 80061 LIPID PANEL: CPT

## 2022-01-03 ENCOUNTER — OFFICE VISIT (OUTPATIENT)
Dept: FAMILY MEDICINE | Facility: CLINIC | Age: 57
End: 2022-01-03
Payer: COMMERCIAL

## 2022-01-03 VITALS
SYSTOLIC BLOOD PRESSURE: 140 MMHG | WEIGHT: 194 LBS | TEMPERATURE: 98 F | OXYGEN SATURATION: 98 % | DIASTOLIC BLOOD PRESSURE: 100 MMHG | BODY MASS INDEX: 29.4 KG/M2 | HEART RATE: 78 BPM | HEIGHT: 68 IN

## 2022-01-03 DIAGNOSIS — L30.9 ECZEMA, UNSPECIFIED TYPE: ICD-10-CM

## 2022-01-03 DIAGNOSIS — R73.09 ABNORMAL GLUCOSE: ICD-10-CM

## 2022-01-03 DIAGNOSIS — Z51.81 MEDICATION MONITORING ENCOUNTER: ICD-10-CM

## 2022-01-03 DIAGNOSIS — Z13.6 CARDIOVASCULAR SCREENING; LDL GOAL LESS THAN 130: ICD-10-CM

## 2022-01-03 DIAGNOSIS — L84 CALLUS OF FOOT: ICD-10-CM

## 2022-01-03 DIAGNOSIS — I10 BENIGN ESSENTIAL HYPERTENSION: Primary | ICD-10-CM

## 2022-01-03 PROCEDURE — 99214 OFFICE O/P EST MOD 30 MIN: CPT | Performed by: FAMILY MEDICINE

## 2022-01-03 ASSESSMENT — MIFFLIN-ST. JEOR: SCORE: 1528.48

## 2022-01-03 NOTE — PROGRESS NOTES
"  Assessment & Plan       ICD-10-CM    1. Benign essential hypertension  I10    2. CARDIOVASCULAR SCREENING; LDL GOAL LESS THAN 130  Z13.6    3. Abnormal glucose  R73.09    4. Callus of foot  L84    5. Eczema, unspecified type  L30.9    6. Medication monitoring encounter  Z51.81        Discussed treatment/modality options, including risk and benefits, she desires:    1) continue 1/2 tab irbesartan/hctz    2) low salt, regular exercise, weight loss, watch BP    3) Prediabetes ?    4) 6 week follow up    5) utter balm, foot file    All diagnosis above reviewed and noted above, otherwise stable.      See [facility] orders for further details.      No follow-ups on file.    No LOS data to display    Doing chart review, history and exam, documentation and further activities as noted.           Paulo Arriola MD, FAAFP     [facility]  [address] [place] [id number]  [email or web address]   Office: [phone]  Fax: [phone]  Pager: [phone]       Christopher Hirsch is a 56 year old who presents for the following health issues     1/3/2022    Recent medications start for HTN - irbesartan/hctz 150/12.5 daily - BP\"s as low as 102/82 - went down 1/2 tab - 12/28 - since then average 125/85 - multiple 128/78 - no med side effects - diurectic effect with increase urination in AM - home cuff is comparable    BP Readings from Last 3 Encounters:   01/03/22 (!) 140/100   12/13/21 (!) 180/92   09/27/21 116/88     Creatinine   Date Value Ref Range Status   12/31/2021 0.66 0.52 - 1.04 mg/dL Final   09/21/2020 0.70 0.52 - 1.04 mg/dL Final     GFR Estimate   Date Value Ref Range Status   12/31/2021 >90 >60 mL/min/1.73m2 Final     Comment:     Effective December 21, 2021 eGFRcr in adults is calculated using the 2021 CKD-EPI creatinine equation which includes age and gender (Tram martinez al., [place], DOI: 10.1056/AXAGdg9715674)   09/21/2020 >90 >60 " mL/min/[1.73_m2] Final     Comment:     Non  GFR Calc  Starting 12/18/2018, serum creatinine based estimated GFR (eGFR) will be   calculated using the Chronic Kidney Disease Epidemiology Collaboration   (CKD-EPI) equation.       Lipids    Recent Labs   Lab Test 12/31/21  0842 09/27/21  0825 07/18/16  0834 07/16/15  0823 07/08/14  0815   CHOL 180 184   < > 165 173   HDL 53 38*   < > 31* 32*   * 129*   < > 111 120   TRIG 82 85   < > 116 107   CHOLHDLRATIO  --   --   --  5.3* 5.4*    < > = values in this interval not displayed.     Glucose    Glucose   Date Value Ref Range Status   12/31/2021 102 (H) 70 - 99 mg/dL Final   09/27/2021 107 (H) 70 - 99 mg/dL Final   09/21/2020 102 (H) 70 - 99 mg/dL Final     Comment:     Fasting specimen   09/09/2019 102 (H) 70 - 99 mg/dL Final     Comment:     Fasting specimen   04/12/2018 90 60 - 109 mg/dL Final   12/27/2017 111 (H) 70 - 99 mg/dL Final     Comment:     Fasting specimen   11/02/2017 102 (H) 70 - 99 mg/dL Final     Comment:     Fasting specimen     Bilateral L>R callous fissures on feet -     History of Present Illness       Hypertension: She presents for follow up of hypertension.  She does check blood pressure  regularly outside of the clinic. Outside blood pressures have been over 140/90. She follows a low salt diet.     She eats 0-1 servings of fruits and vegetables daily.She consumes 0 sweetened beverage(s) daily.She exercises with enough effort to increase her heart rate 20 to 29 minutes per day.  She exercises with enough effort to increase her heart rate 4 days per week.   She is taking medications regularly.     Review of Systems   CONSTITUTIONAL: NEGATIVE for fever, chills, change in weight  INTEGUMENTARY/SKIN: NEGATIVE for worrisome rashes, moles or lesions  EYES: NEGATIVE for vision changes or irritation  ENT/MOUTH: NEGATIVE for ear, mouth and throat problems  RESP: NEGATIVE for significant cough or SOB  CV: NEGATIVE for chest pain,  "palpitations or peripheral edema  GI: NEGATIVE for nausea, abdominal pain, heartburn, or change in bowel habits  : NEGATIVE for frequency, dysuria, or hematuria  MUSCULOSKELETAL: NEGATIVE for significant arthralgias or myalgia  NEURO: NEGATIVE for weakness, dizziness or paresthesias  ENDOCRINE: NEGATIVE for temperature intolerance, skin/hair changes  HEME: NEGATIVE for bleeding problems  PSYCHIATRIC: NEGATIVE for changes in mood or affect      Objective    BP (!) 140/100   Pulse 78   Temp 98  F (36.7  C)   Ht 1.727 m (5' 8\")   Wt 88 kg (194 lb)   LMP 10/29/2011 (Exact Date)   SpO2 98%   BMI 29.50 kg/m    Body mass index is 29.5 kg/m .  Physical Exam   GENERAL: healthy, alert and no distress  EYES: Eyes grossly normal to inspection, PERRL and conjunctivae and sclerae normal  HENT: ear canals and TM's normal, nose and mouth without ulcers or lesions  NECK: no adenopathy, no asymmetry, masses, or scars and thyroid normal to palpation  RESP: lungs clear to auscultation - no rales, rhonchi or wheezes  CV: regular rate and rhythm, normal S1 S2, no S3 or S4, no murmur, click or rub, no peripheral edema and peripheral pulses strong  ABDOMEN: soft, nontender, no hepatosplenomegaly, no masses and bowel sounds normal  MS: no gross musculoskeletal defects noted, no edema  SKIN: no suspicious lesions or rashes  NEURO: Normal strength and tone, mentation intact and speech normal  PSYCH: mentation appears normal, affect normal/bright    Labs reviewed          "

## 2022-01-06 DIAGNOSIS — R73.09 ABNORMAL GLUCOSE: ICD-10-CM

## 2022-01-06 DIAGNOSIS — E78.5 HYPERLIPIDEMIA LDL GOAL <130: Primary | ICD-10-CM

## 2022-01-06 PROCEDURE — 82274 ASSAY TEST FOR BLOOD FECAL: CPT

## 2022-01-10 ENCOUNTER — OFFICE VISIT (OUTPATIENT)
Dept: DERMATOLOGY | Facility: CLINIC | Age: 57
End: 2022-01-10
Payer: COMMERCIAL

## 2022-01-10 VITALS — DIASTOLIC BLOOD PRESSURE: 94 MMHG | SYSTOLIC BLOOD PRESSURE: 146 MMHG

## 2022-01-10 DIAGNOSIS — D18.01 CHERRY ANGIOMA: ICD-10-CM

## 2022-01-10 DIAGNOSIS — L30.9 CHRONIC DERMATITIS OF HANDS: ICD-10-CM

## 2022-01-10 DIAGNOSIS — L82.1 SEBORRHEIC KERATOSES: ICD-10-CM

## 2022-01-10 DIAGNOSIS — D22.9 MULTIPLE BENIGN NEVI: ICD-10-CM

## 2022-01-10 DIAGNOSIS — L85.3 XEROSIS OF SKIN: ICD-10-CM

## 2022-01-10 DIAGNOSIS — L81.4 LENTIGINES: ICD-10-CM

## 2022-01-10 DIAGNOSIS — Z80.8 FAMILY HISTORY OF SKIN CANCER: ICD-10-CM

## 2022-01-10 DIAGNOSIS — L84 CALLUS: Primary | ICD-10-CM

## 2022-01-10 PROCEDURE — 99213 OFFICE O/P EST LOW 20 MIN: CPT | Performed by: PHYSICIAN ASSISTANT

## 2022-01-10 NOTE — PATIENT INSTRUCTIONS
Proper skin care from [facility]:    -Eliminate harsh soaps as they strip the natural oils from the skin, often resulting in dry itchy skin ( i.e. Dial, Zest, Natty Spring)  -Use mild soaps such as Cetaphil or Dove Sensitive Skin in the shower. You do not need to use soap on arms, legs, and trunk every time you shower unless visibly soiled.   -Avoid hot or cold showers.  -After showering, lightly dry off and apply moisturizing within 2-3 minutes. This will help trap moisture in the skin.   -Aggressive use of a moisturizer at least 1-2 times a day to the entire body (including -Vanicream, Cetaphil, Aquaphor or Cerave) and moisturize hands after every washing.  -We recommend using moisturizers that come in a tub that needs to be scooped out, not a pump. This has more of an oil base. It will hold moisture in your skin much better than a water base moisturizer. The above recommended are non-pore clogging.      Wear a sunscreen with at least SPF 30 on your face, ears, neck and V of the chest daily. Wear sunscreen on other areas of the body if those areas are exposed to the sun throughout the day. Sunscreens can contain physical and/or chemical blockers. Physical blockers are less likely to clog pores, these include zinc oxide and titanium dioxide. Reapply every two hour and after swimming.     Sunscreen examples: https://www.ewg.org/sunscreen/    UV radiation  UVA radiation remains constant throughout the day and throughout the year. It is a longer wavelength than UVB and therefore penetrates deeper into the skin leading to immediate and delayed tanning, photoaging, and skin cancer. 70-80% of UVA and UVB radiation occurs between the hours of 10am-2pm.  UVB radiation  UVB radiation causes the most harmful effects and is more significant during the summer months. However, snow and ice can reflect UVB radiation leading to skin damage during the winter months as well. UVB radiation is responsible for tanning,  burning, inflammation, delayed erythema (pinkness), pigmentation (brown spots), and skin cancer.     I recommend self monthly full body exams and yearly full body exams with a dermatology provider. If you develop a new or changing lesion please follow up for examination. Most skin cancers are pink and scaly or pink and pearly. However, we do see blue/brown/black skin cancers.  Consider the ABCDEs of melanoma when giving yourself your monthly full body exam ( don't forget the groin, buttocks, feet, toes, etc). A-asymmetry, B-borders, C-color, D-diameter, E-elevation or evolving. If you see any of these changes please follow up in clinic. If you cannot see your back I recommend purchasing a hand held mirror to use with a larger wall mirror.        Callus on feet  Apply urea cream ( amazon sells 42%) 2x a day  Over the counter 20%    Dry hands  Begin use of wet-dry wraps. This can help drive the moisturizer deep into the skin resulting in greater relief. Apply a thick layer of  Vaseline or Aquaphor then cover with moist/wet sock, glove, or pajamas (depending on area treating) followed by a dry sock, glove, or pajamas over the top. Do this nightly.      Follow up yearly full body exam  Find out what type of skin cancer your brother had

## 2022-01-10 NOTE — LETTER
1/10/2022         RE: Joycelyn Laird  [address] [facility] [id number]        Dear Colleague,    Thank you for referring your patient, Joycelyn Laird, to the [facility]. Please see a copy of my visit note below.    HPI:  Joycelyn Laird is a 56 year old female patient here today for FBE. Had a spot on back .  Patient states this has been present for a short while.  Patient reports the following symptoms: fell off, did not grow back .  Patient reports the following previous treatments: none.  Patient reports the following modifying factors: none.  Associated symptoms: none. Has a history of chronic hand dermatitis. State she is washing her hands a lot due to the COVID pandemic.  Patient has no other skin complaints today.  Remainder of the HPI, Meds, PMH, Allergies, FH, and SH was reviewed in chart.    Pertinent Hx:   Brother had a skin cancer  Past Medical History:   Diagnosis Date     Acute URI 3/23/2019     CARDIOVASCULAR SCREENING; LDL GOAL LESS THAN 130      Eczema 2016     Fatty liver 2013     Glaucoma     Dr NICOLA Lofton     Mild or unspecified pre-eclampsia, unspecified as to episode of care     toxemia pregnancy-twins     Primary localized osteoarthrosis, ankle and foot 10/18/2011       Past Surgical History:   Procedure Laterality Date      SECTION       - twins     COLONOSCOPY N/A 2017    normal, due 5 yrs     DENTAL SURGERY  1998    wisdom tooth     SURGICAL HISTORY OF -   2018    endometrial biopsy benign - Ob/Gyn - Dr Moore        Family History   Problem Relation Age of Onset     Lipids Mother      Hypertension Mother      Lipids Father      Hypertension Father      C.A.D. Father 68     Prostate Cancer Father         Prostrate removed     Peripheral Vascular Disease Father      Colon Cancer Brother         Preventative - suspicious cells     Coronary Artery Disease Paternal Grandfather        Social History     Socioeconomic  History     Marital status:      Spouse name: Phu     Number of children: 2     Years of education: 16     Highest education level: Not on file   Occupational History     Employer: NONE    Tobacco Use     Smoking status: Never Smoker     Smokeless tobacco: Never Used   Substance and Sexual Activity     Alcohol use: Yes     Alcohol/week: 0.0 - 1.0 standard drinks     Drug use: No     Sexual activity: Yes     Partners: Male     Birth control/protection: Post-menopausal   Other Topics Concern      Service Not Asked     Blood Transfusions Not Asked     Caffeine Concern Not Asked     Occupational Exposure Not Asked     Hobby Hazards Not Asked     Sleep Concern Not Asked     Stress Concern Not Asked     Weight Concern Not Asked     Special Diet Not Asked     Back Care Not Asked     Exercise No     Bike Helmet Not Asked     Seat Belt Yes     Self-Exams Yes     Comment: Occaisionally     Parent/sibling w/ CABG, MI or angioplasty before 65F 55M? No   Social History Narrative     Not on file     Social Determinants of Health     Financial Resource Strain: Not on file   Food Insecurity: Not on file   Transportation Needs: Not on file   Physical Activity: Not on file   Stress: Not on file   Social Connections: Not on file   Intimate Partner Violence: Not on file   Housing Stability: Not on file       Outpatient Encounter Medications as of 1/10/2022   Medication Sig Dispense Refill     calcium carbonate (OS-RAINER) 500 MG tablet Take 1 tablet by mouth 2 times daily Patient is taking one tablet three times a week       fish oil-omega-3 fatty acids (OMEGA 3) 1000 MG capsule Take 1 g by mouth every other day  90 capsule 3     Flaxseed, Linseed, (FLAXSEED OIL) 1200 MG CAPS Take by mouth every other day        fluocinonide (LIDEX) 0.05 % cream Apply small amount to affected area bid as needed 30 g 2     irbesartan-hydrochlorothiazide (AVALIDE) 150-12.5 MG tablet Take 1 tablet by mouth daily (Patient taking differently:  Take 1 tablet by mouth daily Take 1/2 tab daily) 90 tablet 3     latanoprost (XALATAN) 0.005 % ophthalmic solution Place 1 drop into both eyes daily       MULTIVITAMIN TABS   OR        No facility-administered encounter medications on file as of 1/10/2022.       Review Of Systems:  Skin: hand dermatitis, spot  Eyes: negative  Ears/Nose/Throat: negative  Respiratory: No shortness of breath, dyspnea on exertion, cough, or hemoptysis  Cardiovascular: negative  Gastrointestinal: negative  Genitourinary: negative  Musculoskeletal: negative  Neurologic: negative  Psychiatric: negative  Hematologic/Lymphatic/Immunologic: negative  Endocrine: negative      Objective:     LMP 10/29/2011 (Exact Date)   Eyes: Conjunctivae/lids: Normal   ENT: Lips:  Normal  MSK: Normal  Cardiovascular: Peripheral edema none  Pulm: Breathing Normal  Neuro/Psych: Orientation: A/O x 3. Normal; Mood/Affect: Normal, NAD, WDWN  Pt accompanied by: self  Following areas examined: Scalp, face, eyelids, lips, neck, chest, abdomen, back, R&L upper and lower extremities, buttock, hips.  Pt defers exam of groin and genitals.   Ho skin type:i   Findings:  Red smooth well-defined macules on trunk and extremities.  Brown, stuck-on scaly appearing papules on trunk and extremities.  Well circumscribed macules with symmetric color distribution on trunk and extremities.  Tan WD smooth macules on face, neck, trunk, and extremities.  Generalized flaking of skin of hand, few pink scaly patches on left palmar hand, fissures of palms and digits  Yellow thickening of heels and lateral aspects of feet, balls of feet    Assessment and Plan:     1) Cherry angiomas, Seborrheic keratoses, Benign nevi, Lentigines   Spot fell off of pts back-nml appearing back  I discussed the specifics of tumor, prognosis, and genetics of benign lesions.  I explained that treatment of these lesions would be purely cosmetic and not medically neccessary.  I discussed with patient  different removal options including excision, cryotherapy, cautery and /or laser.  Lesion may recur and/or may not completely resolve. May need additional treatment.     2)Xerosis of hands, chronic hand dermatitis  Begin use of wet-dry wraps. This can help drive the moisturizer deep into the skin resulting in greater relief. Apply a thick layer of  Vaseline or Aquaphor then cover with moist/wet sock, glove, or pajamas (depending on area treating) followed by a dry sock, glove, or pajamas over the top. Do this nightly.  When flaredApply a thin layer of lidex to affected area 2x a day for 2 weeks. Tapering with improvement. Do not use on face or body folds.  Discussed side effects of topical steroids including but not limited to atrophy (skin thinning), striae (stretch marks) telangiectasias, steroid acne, and others. Do not apply to normal skin. Do not apply to discolored skin that does not have rash present. Educated patient on post inflammatory hyperpigmentation.   3) Callus  Begin urea cream 1-2x a day  Paring down regularly can provide symptomatic relief  Consider keratolytics daily to 2x weekly depending on response: Options include salicylic acid, urea or ammonium lactate    4) Family history of skin cancer  Reviewed patient chart from 1/3/21.  Signs and Symptoms of non-melanoma skin cancer and ABCDEs of melanoma reviewed with patient. Patient encouraged to perform monthly self skin exams and educated on how to perform them. UV precautions reviewed with patient. Patient was asked about new or changing moles/lesions on body.   Wear a sunscreen with at least SPF 30 on your face, ears, neck and V of the chest daily. Wear sunscreen on other areas of the body if those areas are exposed to the sun throughout the day. Sunscreens can contain physical and/or chemical blockers. Physical blockers are less likely to clog pores, these include zinc oxide and titanium dioxide. Reapply every two hour and after  swimming.Sunscreen examples: https://www.ewg.org/sunscreen/    Proper skin care from [facility]:    -Eliminate harsh soaps as they strip the natural oils from the skin, often resulting in dry itchy skin ( i.e. Dial, Zest, Azeri Spring)  -Use mild soaps such as Cetaphil or Dove Sensitive Skin in the shower. You do not need to use soap on arms, legs, and trunk every time you shower unless visibly soiled.   -Avoid hot or cold showers.  -After showering, lightly dry off and apply moisturizing within 2-3 minutes. This will help trap moisture in the skin.   -Aggressive use of a moisturizer at least 1-2 times a day to the entire body (including -Vanicream, Cetaphil, Aquaphor or Cerave) and moisturize hands after every washing.  -We recommend using moisturizers that come in a tub that needs to be scooped out, not a pump. This has more of an oil base. It will hold moisture in your skin much better than a water base moisturizer. The above recommended are non-pore clogging.         It was a pleasure speaking with Joycelyn Laird today.       Follow up in yearly FBE        Again, thank you for allowing me to participate in the care of your patient.        Sincerely,        Oliva Modi PA-C

## 2022-01-10 NOTE — PROGRESS NOTES
HPI:  Joycelyn Laird is a 56 year old female patient here today for FBE. Had a spot on back .  Patient states this has been present for a short while.  Patient reports the following symptoms: fell off, did not grow back .  Patient reports the following previous treatments: none.  Patient reports the following modifying factors: none.  Associated symptoms: none. Has a history of chronic hand dermatitis. State she is washing her hands a lot due to the COVID pandemic.  Patient has no other skin complaints today.  Remainder of the HPI, Meds, PMH, Allergies, FH, and SH was reviewed in chart.    Pertinent Hx:   Brother had a skin cancer  Past Medical History:   Diagnosis Date     Acute URI 3/23/2019     CARDIOVASCULAR SCREENING; LDL GOAL LESS THAN 130      Eczema 2016     Fatty liver      Glaucoma     Dr NICOLA Lofton     Mild or unspecified pre-eclampsia, unspecified as to episode of care     toxemia pregnancy-twins     Primary localized osteoarthrosis, ankle and foot 10/18/2011       Past Surgical History:   Procedure Laterality Date      SECTION       - twins     COLONOSCOPY N/A 2017    normal, due 5 yrs     DENTAL SURGERY  1998    wisdom tooth     SURGICAL HISTORY OF -   2018    endometrial biopsy benign - Ob/Gyn - Dr Moore        Family History   Problem Relation Age of Onset     Lipids Mother      Hypertension Mother      Lipids Father      Hypertension Father      C.A.D. Father 68     Prostate Cancer Father         Prostrate removed     Peripheral Vascular Disease Father      Colon Cancer Brother         Preventative - suspicious cells     Coronary Artery Disease Paternal Grandfather        Social History     Socioeconomic History     Marital status:      Spouse name: Phu     Number of children: 2     Years of education: 16     Highest education level: Not on file   Occupational History     Employer: NONE    Tobacco Use     Smoking status: Never Smoker     Smokeless  tobacco: Never Used   Substance and Sexual Activity     Alcohol use: Yes     Alcohol/week: 0.0 - 1.0 standard drinks     Drug use: No     Sexual activity: Yes     Partners: Male     Birth control/protection: Post-menopausal   Other Topics Concern      Service Not Asked     Blood Transfusions Not Asked     Caffeine Concern Not Asked     Occupational Exposure Not Asked     Hobby Hazards Not Asked     Sleep Concern Not Asked     Stress Concern Not Asked     Weight Concern Not Asked     Special Diet Not Asked     Back Care Not Asked     Exercise No     Bike Helmet Not Asked     Seat Belt Yes     Self-Exams Yes     Comment: Occaisionally     Parent/sibling w/ CABG, MI or angioplasty before 65F 55M? No   Social History Narrative     Not on file     Social Determinants of Health     Financial Resource Strain: Not on file   Food Insecurity: Not on file   Transportation Needs: Not on file   Physical Activity: Not on file   Stress: Not on file   Social Connections: Not on file   Intimate Partner Violence: Not on file   Housing Stability: Not on file       Outpatient Encounter Medications as of 1/10/2022   Medication Sig Dispense Refill     calcium carbonate (OS-RAINER) 500 MG tablet Take 1 tablet by mouth 2 times daily Patient is taking one tablet three times a week       fish oil-omega-3 fatty acids (OMEGA 3) 1000 MG capsule Take 1 g by mouth every other day  90 capsule 3     Flaxseed, Linseed, (FLAXSEED OIL) 1200 MG CAPS Take by mouth every other day        fluocinonide (LIDEX) 0.05 % cream Apply small amount to affected area bid as needed 30 g 2     irbesartan-hydrochlorothiazide (AVALIDE) 150-12.5 MG tablet Take 1 tablet by mouth daily (Patient taking differently: Take 1 tablet by mouth daily Take 1/2 tab daily) 90 tablet 3     latanoprost (XALATAN) 0.005 % ophthalmic solution Place 1 drop into both eyes daily       MULTIVITAMIN TABS   OR        No facility-administered encounter medications on file as of 1/10/2022.        Review Of Systems:  Skin: hand dermatitis, spot  Eyes: negative  Ears/Nose/Throat: negative  Respiratory: No shortness of breath, dyspnea on exertion, cough, or hemoptysis  Cardiovascular: negative  Gastrointestinal: negative  Genitourinary: negative  Musculoskeletal: negative  Neurologic: negative  Psychiatric: negative  Hematologic/Lymphatic/Immunologic: negative  Endocrine: negative      Objective:     LMP 10/29/2011 (Exact Date)   Eyes: Conjunctivae/lids: Normal   ENT: Lips:  Normal  MSK: Normal  Cardiovascular: Peripheral edema none  Pulm: Breathing Normal  Neuro/Psych: Orientation: A/O x 3. Normal; Mood/Affect: Normal, NAD, WDWN  Pt accompanied by: self  Following areas examined: Scalp, face, eyelids, lips, neck, chest, abdomen, back, R&L upper and lower extremities, buttock, hips.  Pt defers exam of groin and genitals.   Ho skin type:i   Findings:  Red smooth well-defined macules on trunk and extremities.  Brown, stuck-on scaly appearing papules on trunk and extremities.  Well circumscribed macules with symmetric color distribution on trunk and extremities.  Tan WD smooth macules on face, neck, trunk, and extremities.  Generalized flaking of skin of hand, few pink scaly patches on left palmar hand, fissures of palms and digits  Yellow thickening of heels and lateral aspects of feet, balls of feet    Assessment and Plan:     1) Cherry angiomas, Seborrheic keratoses, Benign nevi, Lentigines   Spot fell off of pts back-nml appearing back  I discussed the specifics of tumor, prognosis, and genetics of benign lesions.  I explained that treatment of these lesions would be purely cosmetic and not medically neccessary.  I discussed with patient different removal options including excision, cryotherapy, cautery and /or laser.  Lesion may recur and/or may not completely resolve. May need additional treatment.     2)Xerosis of hands, chronic hand dermatitis  Begin use of wet-dry wraps. This can help drive  the moisturizer deep into the skin resulting in greater relief. Apply a thick layer of  Vaseline or Aquaphor then cover with moist/wet sock, glove, or pajamas (depending on area treating) followed by a dry sock, glove, or pajamas over the top. Do this nightly.  When flaredApply a thin layer of lidex to affected area 2x a day for 2 weeks. Tapering with improvement. Do not use on face or body folds.  Discussed side effects of topical steroids including but not limited to atrophy (skin thinning), striae (stretch marks) telangiectasias, steroid acne, and others. Do not apply to normal skin. Do not apply to discolored skin that does not have rash present. Educated patient on post inflammatory hyperpigmentation.   3) Callus  Begin urea cream 1-2x a day  Paring down regularly can provide symptomatic relief  Consider keratolytics daily to 2x weekly depending on response: Options include salicylic acid, urea or ammonium lactate    4) Family history of skin cancer  Reviewed patient chart from 1/3/21.  Signs and Symptoms of non-melanoma skin cancer and ABCDEs of melanoma reviewed with patient. Patient encouraged to perform monthly self skin exams and educated on how to perform them. UV precautions reviewed with patient. Patient was asked about new or changing moles/lesions on body.   Wear a sunscreen with at least SPF 30 on your face, ears, neck and V of the chest daily. Wear sunscreen on other areas of the body if those areas are exposed to the sun throughout the day. Sunscreens can contain physical and/or chemical blockers. Physical blockers are less likely to clog pores, these include zinc oxide and titanium dioxide. Reapply every two hour and after swimming.Sunscreen examples: https://www.ewg.org/sunscreen/    Proper skin care from [facility]:    -Eliminate harsh soaps as they strip the natural oils from the skin, often resulting in dry itchy skin ( i.e. Dial, Zest, Indonesian Spring)  -Use mild soaps such as Cetaphil  or Dove Sensitive Skin in the shower. You do not need to use soap on arms, legs, and trunk every time you shower unless visibly soiled.   -Avoid hot or cold showers.  -After showering, lightly dry off and apply moisturizing within 2-3 minutes. This will help trap moisture in the skin.   -Aggressive use of a moisturizer at least 1-2 times a day to the entire body (including -Vanicream, Cetaphil, Aquaphor or Cerave) and moisturize hands after every washing.  -We recommend using moisturizers that come in a tub that needs to be scooped out, not a pump. This has more of an oil base. It will hold moisture in your skin much better than a water base moisturizer. The above recommended are non-pore clogging.         It was a pleasure speaking with Joycelyn Laird today.       Follow up in yearly FBE

## 2022-01-11 ENCOUNTER — HOSPITAL ENCOUNTER (OUTPATIENT)
Dept: MAMMOGRAPHY | Facility: CLINIC | Age: 57
Discharge: HOME | End: 2022-01-11
Attending: FAMILY MEDICINE | Admitting: FAMILY MEDICINE
Payer: COMMERCIAL

## 2022-01-11 DIAGNOSIS — Z12.31 ENCOUNTER FOR SCREENING MAMMOGRAM FOR MALIGNANT NEOPLASM OF BREAST: ICD-10-CM

## 2022-01-11 PROCEDURE — 77067 SCR MAMMO BI INCL CAD: CPT

## 2022-03-03 ENCOUNTER — MYC MEDICAL ADVICE (OUTPATIENT)
Dept: FAMILY MEDICINE | Facility: CLINIC | Age: 57
End: 2022-03-03
Payer: COMMERCIAL

## 2022-03-04 NOTE — TELEPHONE ENCOUNTER
If possible, try 1/4 tab    If not irbesartan/hctz 75/12.5 mg 1/2 tab every am (#90, r x 3), watch BP and close follow up as scheduled

## 2022-03-17 ENCOUNTER — LAB (OUTPATIENT)
Dept: LAB | Facility: CLINIC | Age: 57
End: 2022-03-17
Payer: COMMERCIAL

## 2022-03-17 DIAGNOSIS — R73.09 ABNORMAL GLUCOSE: ICD-10-CM

## 2022-03-17 DIAGNOSIS — E78.5 HYPERLIPIDEMIA LDL GOAL <130: ICD-10-CM

## 2022-03-17 LAB
CHOLEST SERPL-MCNC: 204 MG/DL
FASTING STATUS PATIENT QL REPORTED: YES
HBA1C MFR BLD: 5.2 % (ref 0–5.6)
HDLC SERPL-MCNC: 41 MG/DL
LDLC SERPL CALC-MCNC: 149 MG/DL
NONHDLC SERPL-MCNC: 163 MG/DL
TRIGL SERPL-MCNC: 70 MG/DL

## 2022-03-17 PROCEDURE — 80061 LIPID PANEL: CPT

## 2022-03-17 PROCEDURE — 36415 COLL VENOUS BLD VENIPUNCTURE: CPT

## 2022-03-17 PROCEDURE — 83036 HEMOGLOBIN GLYCOSYLATED A1C: CPT

## 2022-03-21 ENCOUNTER — OFFICE VISIT (OUTPATIENT)
Dept: FAMILY MEDICINE | Facility: CLINIC | Age: 57
End: 2022-03-21
Payer: COMMERCIAL

## 2022-03-21 VITALS
HEIGHT: 68 IN | TEMPERATURE: 97.6 F | OXYGEN SATURATION: 98 % | BODY MASS INDEX: 30.16 KG/M2 | RESPIRATION RATE: 16 BRPM | WEIGHT: 199 LBS | HEART RATE: 80 BPM | DIASTOLIC BLOOD PRESSURE: 82 MMHG | SYSTOLIC BLOOD PRESSURE: 138 MMHG

## 2022-03-21 DIAGNOSIS — R73.09 ABNORMAL GLUCOSE: ICD-10-CM

## 2022-03-21 DIAGNOSIS — I10 BENIGN ESSENTIAL HYPERTENSION: Primary | ICD-10-CM

## 2022-03-21 DIAGNOSIS — Z51.81 MEDICATION MONITORING ENCOUNTER: ICD-10-CM

## 2022-03-21 DIAGNOSIS — E78.5 HYPERLIPIDEMIA LDL GOAL <130: ICD-10-CM

## 2022-03-21 PROCEDURE — 99214 OFFICE O/P EST MOD 30 MIN: CPT | Performed by: FAMILY MEDICINE

## 2022-03-21 NOTE — PROGRESS NOTES
"  Assessment & Plan       ICD-10-CM    1. Benign essential hypertension  I10 24 Hour Blood Pressure Monitor - Adult     Comprehensive metabolic panel (BMP + Alb, Alk Phos, ALT, AST, Total. Bili, TP)   2. Hyperlipidemia LDL goal <130  E78.5 24 Hour Blood Pressure Monitor - Adult     Comprehensive metabolic panel (BMP + Alb, Alk Phos, ALT, AST, Total. Bili, TP)     Lipid panel reflex to direct LDL Fasting     CK total   3. Abnormal glucose  R73.09 24 Hour Blood Pressure Monitor - Adult     Comprehensive metabolic panel (BMP + Alb, Alk Phos, ALT, AST, Total. Bili, TP)     Hemoglobin A1c   4. Medication monitoring encounter  Z51.81 Comprehensive metabolic panel (BMP + Alb, Alk Phos, ALT, AST, Total. Bili, TP)     Lipid panel reflex to direct LDL Fasting     CK total     Hemoglobin A1c       Discussed treatment/modality options, including risk and benefits, she desires:    1) 24 hr blood pressure monitor, continue 1/4 tab irbesartan/hctz 150/12.5    2) fasting labs in 3-4 months    3) declines rosuvastatin for now    4) low salt, low cholesterol diet, regular exercise, weight loss    5) follow BP    All diagnosis above reviewed and noted above, otherwise stable.      See [facility] orders for further details.      BMI:   Estimated body mass index is 30.26 kg/m  as calculated from the following:    Height as of this encounter: 1.727 m (5' 8\").    Weight as of this encounter: 90.3 kg (199 lb).   Weight management plan: diet and exercise    Return in about 3 months (around 6/21/2022) for Medication Recheck Visit, Follow Up Chronic.    No LOS data to display    Doing chart review, history and exam, documentation and further activities as noted.           Paulo Arriola MD, FAAFP     [facility]  [address] [place] [id number]  [email or web address]   Office: [phone]  Fax: [phone]  Pager: [phone]       Christopher   Joycelyn is a " 56 year old who presents for the following health issues     History of Present Illness       Hypertension: She presents for follow up of hypertension.  She does check blood pressure  regularly outside of the clinic. Outpatient blood pressures have not been over 140/90. She follows a low salt diet.     She eats 2-3 servings of fruits and vegetables daily.She consumes 0 sweetened beverage(s) daily.She exercises with enough effort to increase her heart rate 10 to 19 minutes per day.  She exercises with enough effort to increase her heart rate 4 days per week.   She is taking medications regularly.     Her cuff - 134/96    BP Readings from Last 3 Encounters:   03/21/22 138/82   01/10/22 (!) 146/94   01/03/22 (!) 140/100     Creatinine   Date Value Ref Range Status   12/31/2021 0.66 0.52 - 1.04 mg/dL Final   09/21/2020 0.70 0.52 - 1.04 mg/dL Final     GFR Estimate   Date Value Ref Range Status   12/31/2021 >90 >60 mL/min/1.73m2 Final     Comment:     Effective December 21, 2021 eGFRcr in adults is calculated using the 2021 CKD-EPI creatinine equation which includes age and gender (Tram et al., NEJM, DOI: 10.1056/VSPNvd9428189)   09/21/2020 >90 >60 mL/min/[1.73_m2] Final     Comment:     Non  GFR Calc  Starting 12/18/2018, serum creatinine based estimated GFR (eGFR) will be   calculated using the Chronic Kidney Disease Epidemiology Collaboration   (CKD-EPI) equation.       BP quite variable at home  - 96/77 to 127/108 - at nights can feel lightheaded and dizziness  - taking 1/4 pill of irbesartan/hctz 150/12.5 - no HA's, no CP, no SOB, some NUNEZ - notes concerns with stress    Glucose's borderline    Glucose   Date Value Ref Range Status   12/31/2021 102 (H) 70 - 99 mg/dL Final   09/27/2021 107 (H) 70 - 99 mg/dL Final   09/21/2020 102 (H) 70 - 99 mg/dL Final     Comment:     Fasting specimen   09/09/2019 102 (H) 70 - 99 mg/dL Final     Comment:     Fasting specimen   04/12/2018 90 60 - 109 mg/dL Final  "  12/27/2017 111 (H) 70 - 99 mg/dL Final     Comment:     Fasting specimen   11/02/2017 102 (H) 70 - 99 mg/dL Final     Comment:     Fasting specimen     Lab Results   Component Value Date    A1C 5.2 03/17/2022    A1C 5.6 04/12/2018    A1C 5.5 07/18/2016    A1C 5.4 09/29/2014     Review of Systems   CONSTITUTIONAL: NEGATIVE for fever, chills, change in weight  INTEGUMENTARY/SKIN: NEGATIVE for worrisome rashes, moles or lesions  EYES: NEGATIVE for vision changes or irritation  ENT/MOUTH: NEGATIVE for ear, mouth and throat problems  RESP: NEGATIVE for significant cough or SOB  CV: NEGATIVE for chest pain, palpitations or peripheral edema  GI: NEGATIVE for nausea, abdominal pain, heartburn, or change in bowel habits  : NEGATIVE for frequency, dysuria, or hematuria  MUSCULOSKELETAL: NEGATIVE for significant arthralgias or myalgia  NEURO: NEGATIVE for weakness, dizziness or paresthesias  ENDOCRINE: NEGATIVE for temperature intolerance, skin/hair changes  HEME: NEGATIVE for bleeding problems  PSYCHIATRIC: NEGATIVE for changes in mood or affect      Objective    /82   Pulse 80   Temp 97.6  F (36.4  C) (Tympanic)   Resp 16   Ht 1.727 m (5' 8\")   Wt 90.3 kg (199 lb)   LMP 10/29/2011 (Exact Date)   SpO2 98%   BMI 30.26 kg/m    Body mass index is 30.26 kg/m .  Physical Exam   GENERAL: healthy, alert and no distress  EYES: Eyes grossly normal to inspection, PERRL and conjunctivae and sclerae normal  HENT: ear canals and TM's normal, nose and mouth without ulcers or lesions  NECK: no adenopathy, no asymmetry, masses, or scars and thyroid normal to palpation  RESP: lungs clear to auscultation - no rales, rhonchi or wheezes  BREAST: normal without masses, tenderness or nipple discharge and no palpable axillary masses or adenopathy  CV: regular rate and rhythm, normal S1 S2, no S3 or S4, no murmur, click or rub, no peripheral edema and peripheral pulses strong  ABDOMEN: soft, nontender, no hepatosplenomegaly, no " masses and bowel sounds normal  MS: no gross musculoskeletal defects noted, no edema  SKIN: no suspicious lesions or rashes  NEURO: Normal strength and tone, mentation intact and speech normal  PSYCH: mentation appears normal, affect normal/bright    Labs reviewed, pending

## 2022-06-03 ENCOUNTER — MYC MEDICAL ADVICE (OUTPATIENT)
Dept: FAMILY MEDICINE | Facility: CLINIC | Age: 57
End: 2022-06-03
Payer: COMMERCIAL

## 2022-06-15 ENCOUNTER — IMMUNIZATION (OUTPATIENT)
Dept: NURSING | Facility: CLINIC | Age: 57
End: 2022-06-15
Payer: COMMERCIAL

## 2022-06-15 PROCEDURE — 91305 COVID-19,PF,PFIZER (12+ YRS): CPT

## 2022-06-15 PROCEDURE — 0054A COVID-19,PF,PFIZER (12+ YRS): CPT

## 2022-07-13 ENCOUNTER — VIRTUAL VISIT (OUTPATIENT)
Dept: FAMILY MEDICINE | Facility: CLINIC | Age: 57
End: 2022-07-13
Payer: COMMERCIAL

## 2022-07-13 ENCOUNTER — LAB (OUTPATIENT)
Dept: LAB | Facility: CLINIC | Age: 57
End: 2022-07-13

## 2022-07-13 DIAGNOSIS — R35.0 URINARY FREQUENCY: ICD-10-CM

## 2022-07-13 DIAGNOSIS — R35.0 URINARY FREQUENCY: Primary | ICD-10-CM

## 2022-07-13 LAB
ALBUMIN UR-MCNC: NEGATIVE MG/DL
APPEARANCE UR: CLEAR
BACTERIA #/AREA URNS HPF: ABNORMAL /HPF
BILIRUB UR QL STRIP: NEGATIVE
CLUE CELLS: ABNORMAL
COLOR UR AUTO: YELLOW
GLUCOSE UR STRIP-MCNC: NEGATIVE MG/DL
HGB UR QL STRIP: ABNORMAL
KETONES UR STRIP-MCNC: NEGATIVE MG/DL
LEUKOCYTE ESTERASE UR QL STRIP: NEGATIVE
NITRATE UR QL: NEGATIVE
PH UR STRIP: 6 [PH] (ref 5–7)
RBC #/AREA URNS AUTO: ABNORMAL /HPF
SP GR UR STRIP: 1.01 (ref 1–1.03)
TRICHOMONAS, WET PREP: ABNORMAL
UROBILINOGEN UR STRIP-ACNC: 0.2 E.U./DL
WBC #/AREA URNS AUTO: ABNORMAL /HPF
WBC'S/HIGH POWER FIELD, WET PREP: ABNORMAL
YEAST, WET PREP: ABNORMAL

## 2022-07-13 PROCEDURE — 87088 URINE BACTERIA CULTURE: CPT

## 2022-07-13 PROCEDURE — 99213 OFFICE O/P EST LOW 20 MIN: CPT | Mod: TEL | Performed by: FAMILY MEDICINE

## 2022-07-13 PROCEDURE — 81001 URINALYSIS AUTO W/SCOPE: CPT

## 2022-07-13 PROCEDURE — 87086 URINE CULTURE/COLONY COUNT: CPT

## 2022-07-13 PROCEDURE — 87210 SMEAR WET MOUNT SALINE/INK: CPT | Performed by: FAMILY MEDICINE

## 2022-07-13 RX ORDER — FLUCONAZOLE 150 MG/1
TABLET ORAL
Qty: 2 TABLET | Refills: 0 | Status: SHIPPED | OUTPATIENT
Start: 2022-07-13 | End: 2022-07-22

## 2022-07-13 NOTE — PROGRESS NOTES
Joycelyn is a 56 year old who is being evaluated via a billable telephone visit.      What phone number would you like to be contacted at? [phone]  How would you like to obtain your AVS? MyChart    Assessment & Plan     Urinary frequency with vaginal itching  We discussed differential diagnosis primarily including UTI versus vaginitis.  She has no risk factors for sexually transmitted fractions.  No suggestion of underlying pyelonephritis.  We will start by doing lab only visit for UA and self obtain wet prep.  Further follow-up and recommendations pending those results.  - UA reflex to Microscopic and Culture; Future  - Wet preparation                 No follow-ups on file.    Lizet Nj MD  [facility]    Christopher Hirsch is a 56 year old, presenting for the following health issues:  No chief complaint on file.      Previously healthy female with history of controlled hypertension evaluated by phone with 1 week of symptoms.  Initially vaginal itching without discharge, then development of urinary frequency and urgency, a little better during the daytime but worsening now at night, disrupting sleep.  Urine is with some odor and cloudiness.  No hematuria.  No fevers, chills, back pain.  No new sexual encounters.  No vaginal discharge or cramping.  Prior history of UTI, previously has had more significant dysuria with UTI.             Review of Systems         Objective           Vitals:  No vitals were obtained today due to virtual visit.    Physical Exam   healthy, alert and no distress  PSYCH: Alert and oriented times 3; coherent speech, normal   rate and volume, able to articulate logical thoughts, able   to abstract reason, no tangential thoughts, no hallucinations   or delusions  Her affect is normal  RESP: No cough, no audible wheezing, able to talk in full sentences  Remainder of exam unable to be completed due to telephone visits                Phone call duration: 7  minutes    .  ..

## 2022-07-13 NOTE — RESULT ENCOUNTER NOTE
It is unlikely that you have a bladder infection.  Trace amount of blood was seen with the chemical test, no blood was seen under the microscope.  I will send your urine off for culture to ensure you do not have infection.  Your wet prep shows some white blood cells which is not uncommon.  With your symptoms, I recommend empiric treatment with fluconazole to take 1 tab now and repeat 1 tab in 3 days.  If your symptoms do not resolve, I recommend you schedule a clinic visit for more complete evaluation.

## 2022-07-16 LAB
BACTERIA UR CULT: ABNORMAL
BACTERIA UR CULT: ABNORMAL

## 2022-07-17 ENCOUNTER — NURSE TRIAGE (OUTPATIENT)
Dept: NURSING | Facility: CLINIC | Age: 57
End: 2022-07-17

## 2022-07-17 DIAGNOSIS — N30.00 ACUTE CYSTITIS WITHOUT HEMATURIA: Primary | ICD-10-CM

## 2022-07-17 RX ORDER — SULFAMETHOXAZOLE/TRIMETHOPRIM 800-160 MG
1 TABLET ORAL 2 TIMES DAILY
Qty: 10 TABLET | Refills: 0 | Status: SHIPPED | OUTPATIENT
Start: 2022-07-17 | End: 2022-07-22

## 2022-07-17 NOTE — TELEPHONE ENCOUNTER
"Joycelyn is calling to ask if current Bactrim or recent fluconazole use will affect lab results for planned fasting labs on Tuesday 7/19/22.    Routing question to lab ordering provider, Dr. Arriola.      Alice Paris RN  Rebecca Nurse Advisor  5:17 PM  7/17/2022    COVID 19 Nurse Triage Plan/Patient Instructions    Please be aware that novel coronavirus (COVID-19) may be circulating in the community. If you develop symptoms such as fever, cough, or SOB or if you have concerns about the presence of another infection including coronavirus (COVID-19), please contact your health care provider or visit [email or web address].     Disposition/Instructions    Additional COVID19 information to add for patients.   How can I protect others?  If you have symptoms (fever, cough, body aches or trouble breathing): Stay home and away from others (self-isolate) until:    At least 10 days have passed since your symptoms started, And     You ve had no fever--and no medicine that reduces fever--for 1 full day (24 hours), And      Your other symptoms have resolved (gotten better).     If you don t have symptoms, but a test showed that you have COVID-19 (you tested positive):    Stay home and away from others (self-isolate). Follow the tips under \"How do I self-isolate?\" below for 10 days (20 days if you have a weak immune system).    You don't need to be retested for COVID-19 before going back to school or work. As long as you're fever-free and feeling better, you can go back to school, work and other activities after waiting the 10 or 20 days.     How do I self-isolate?    Stay in your own room, even for meals. Use your own bathroom if you can.     Stay away from others in your home. No hugging, kissing or shaking hands. No visitors.    Don t go to work, school or anywhere else.     Clean  high touch  surfaces often (doorknobs, counters, handles, etc.). Use a household cleaning spray or wipes. You ll find a full list on the EPA " website:  www.epa.gov/pesticide-registration/list-n-disinfectants-use-against-sars-cov-2.    Cover your mouth and nose with a mask, tissue or washcloth to avoid spreading germs.    Wash your hands and face often. Use soap and water.    Caregivers in these groups are at risk for severe illness due to COVID-19:  o People 65 years and older  o People who live in a nursing home or long-term care facility  o People with chronic disease (lung, heart, cancer, diabetes, kidney, liver, immunologic)  o People who have a weakened immune system, including those who:  - Are in cancer treatment  - Take medicine that weakens the immune system, such as corticosteroids  - Had a bone marrow or organ transplant  - Have an immune deficiency  - Have poorly controlled HIV or AIDS  - Are obese (body mass index of 40 or higher)  - Smoke regularly    Caregivers should wear gloves while washing dishes, handling laundry and cleaning bedrooms and bathrooms.    Use caution when washing and drying laundry: Don t shake dirty laundry, and use the warmest water setting that you can.    For more tips, go to www.cdc.gov/coronavirus/2019-ncov/downloads/10Things.pdf.    How can I take care of myself?  1. Get lots of rest. Drink extra fluids (unless a doctor has told you not to).     2. Take Tylenol (acetaminophen) for fever or pain. If you have liver or kidney problems, ask your family doctor if it s okay to take Tylenol.     Adults can take either:     650 mg (two 325 mg pills) every 4 to 6 hours, or     1,000 mg (two 500 mg pills) every 8 hours as needed.     Note: Don t take more than 3,000 mg in one day.   Acetaminophen is found in many medicines (both prescribed and over-the-counter medicines). Read all labels to be sure you don t take too much.     For children, check the Tylenol bottle for the right dose. The dose is based on the child s age or weight.    3. If you have other health problems (like cancer, heart failure, an organ transplant or  severe kidney disease): Call your specialty clinic if you don t feel better in the next 2 days.    4. Know when to call 911: Emergency warning signs include:    Trouble breathing or shortness of breath    Pain or pressure in the chest that doesn t go away    Feeling confused like you haven t felt before, or not being able to wake up    Bluish-colored lips or face    What are the symptoms of COVID-19?     The most common symptoms are cough, fever and trouble breathing.     Less common symptoms include body aches, chills, diarrhea (loose, watery poops), fatigue (feeling very tired), headache, runny nose, sore throat and loss of smell.    COVID-19 can cause severe coughing (bronchitis) and lung infection (pneumonia).    How does it spread?     The virus may spread when a person coughs or sneezes into the air. The virus can travel about 6 feet this way, and it can live on surfaces.      Common  (household disinfectants) will kill the virus.    Who is at risk?  Anyone can catch COVID-19 if they re around someone who has the virus.    How can others protect themselves?     Stay away from people who have COVID-19 (or symptoms of COVID-19).    Wash hands often with soap and water. Or, use hand  with at least 60% alcohol.    Avoid touching the eyes, nose or mouth.     Wear a face mask when you go out in public, when sick or when caring for a sick person.    Where can I get more information?    [facility]: About COVID-19: [email or web address]    CDC: What to Do If You re Sick: www.cdc.gov/coronavirus/2019-ncov/about/steps-when-sick.html    CDC: Ending Home Isolation: www.cdc.gov/coronavirus/2019-ncov/hcp/disposition-in-home-patients.html     CDC: Caring for Someone: www.cdc.gov/coronavirus/2019-ncov/if-you-are-sick/care-for-someone.html     [facility]: Interim Guidance for Hospital Discharge to Home: www.health.[facility].mn.us/diseases/coronavirus/hcp/hospdischarge.pdf    [facility]  clinical trials (COVID-19 research studies): clinicalaffairs.[facility]/umn-clinical-trials     Below are the COVID-19 hotlines at the [place] Department of Health ([facility]). Interpreters are available.   o For health questions: Call 793-660-8149 or 1-444.518.1434 (7 a.m. to 7 p.m.)  o For questions about schools and childcare: Call 493-083-7512 or 1-206.776.8867 (7 a.m. to 7 p.m.)          Thank you for taking steps to prevent the spread of this virus.  o Limit your contact with others.  o Wear a simple mask to cover your cough.  o Wash your hands well and often.    Resources    [facility]: About COVID-19: [email or web address]    CDC: What to Do If You're Sick: www.cdc.gov/coronavirus/2019-ncov/about/steps-when-sick.html    CDC: Ending Home Isolation: www.cdc.gov/coronavirus/2019-ncov/hcp/disposition-in-home-patients.html     CDC: Caring for Someone: www.cdc.gov/coronavirus/2019-ncov/if-you-are-sick/care-for-someone.html     [facility]: Interim Guidance for Hospital Discharge to Home: www.[facility].mn./diseases/coronavirus/hcp/hospdischarge.pdf    [facility] clinical trials (COVID-19 research studies): clinicalaffairs.[facility]/n-clinical-trials     Below are the COVID-19 hotlines at the [place] Department of Health ([facility]). Interpreters are available.   o For health questions: Call 241-241-3984 or 1-478.941.5431 (7 a.m. to 7 p.m.)  o For questions about schools and childcare: Call 845-522-4644 or 1-455.322.7101 (7 a.m. to 7 p.m.)                   Reason for Disposition    [1] Follow-up call from patient regarding patient's clinical status AND [2] information NON-URGENT    Protocols used: PCP CALL - NO TRIAGE-A-AH

## 2022-07-17 NOTE — RESULT ENCOUNTER NOTE
Your urine culture suggests that you could have an infection.  The amount bacteria is smaller than seen in the typical infection, however your symptoms suggest and infection could be present.  I have sent in in a prescription for the antibiotic Bactrim, a sulfa-based antibiotic, to be taken twice a day for 5 days.  If your symptoms persist or fail to resolve, be sure to let us know.

## 2022-07-18 NOTE — TELEPHONE ENCOUNTER
Called # [phone]     Advised patient, no concerns per Dr. Arriola. Patient will start Bactrim today.     Future Appointments   Date Time Provider Department Center   7/19/2022  7:15 AM RV LAB RVLABR RV   7/22/2022  7:00 AM Paulo Arriola MD RVFP RV        Kassie Rojo RN  [facility]

## 2022-07-19 ENCOUNTER — LAB (OUTPATIENT)
Dept: LAB | Facility: CLINIC | Age: 57
End: 2022-07-19
Payer: COMMERCIAL

## 2022-07-19 DIAGNOSIS — Z51.81 MEDICATION MONITORING ENCOUNTER: ICD-10-CM

## 2022-07-19 DIAGNOSIS — E78.5 HYPERLIPIDEMIA LDL GOAL <130: ICD-10-CM

## 2022-07-19 DIAGNOSIS — R73.09 ABNORMAL GLUCOSE: ICD-10-CM

## 2022-07-19 DIAGNOSIS — I10 BENIGN ESSENTIAL HYPERTENSION: ICD-10-CM

## 2022-07-19 LAB
ALBUMIN SERPL-MCNC: 4.1 G/DL (ref 3.4–5)
ALP SERPL-CCNC: 51 U/L (ref 40–150)
ALT SERPL W P-5'-P-CCNC: 24 U/L (ref 0–50)
ANION GAP SERPL CALCULATED.3IONS-SCNC: 8 MMOL/L (ref 3–14)
AST SERPL W P-5'-P-CCNC: 23 U/L (ref 0–45)
BILIRUB SERPL-MCNC: 0.6 MG/DL (ref 0.2–1.3)
BUN SERPL-MCNC: 14 MG/DL (ref 7–30)
CALCIUM SERPL-MCNC: 9.7 MG/DL (ref 8.5–10.1)
CHLORIDE BLD-SCNC: 107 MMOL/L (ref 94–109)
CHOLEST SERPL-MCNC: 157 MG/DL
CK SERPL-CCNC: 198 U/L (ref 30–225)
CO2 SERPL-SCNC: 25 MMOL/L (ref 20–32)
CREAT SERPL-MCNC: 0.84 MG/DL (ref 0.52–1.04)
FASTING STATUS PATIENT QL REPORTED: YES
GFR SERPL CREATININE-BSD FRML MDRD: 82 ML/MIN/1.73M2
GLUCOSE BLD-MCNC: 87 MG/DL (ref 70–99)
HBA1C MFR BLD: 5.6 % (ref 0–5.6)
HDLC SERPL-MCNC: 47 MG/DL
LDLC SERPL CALC-MCNC: 101 MG/DL
NONHDLC SERPL-MCNC: 110 MG/DL
POTASSIUM BLD-SCNC: 3.6 MMOL/L (ref 3.4–5.3)
PROT SERPL-MCNC: 7.7 G/DL (ref 6.8–8.8)
SODIUM SERPL-SCNC: 140 MMOL/L (ref 133–144)
TRIGL SERPL-MCNC: 47 MG/DL

## 2022-07-19 PROCEDURE — 80053 COMPREHEN METABOLIC PANEL: CPT

## 2022-07-19 PROCEDURE — 80061 LIPID PANEL: CPT

## 2022-07-19 PROCEDURE — 82550 ASSAY OF CK (CPK): CPT

## 2022-07-19 PROCEDURE — 83036 HEMOGLOBIN GLYCOSYLATED A1C: CPT

## 2022-07-19 PROCEDURE — 36415 COLL VENOUS BLD VENIPUNCTURE: CPT

## 2022-07-22 ENCOUNTER — OFFICE VISIT (OUTPATIENT)
Dept: FAMILY MEDICINE | Facility: CLINIC | Age: 57
End: 2022-07-22
Payer: COMMERCIAL

## 2022-07-22 VITALS
WEIGHT: 177 LBS | BODY MASS INDEX: 26.83 KG/M2 | DIASTOLIC BLOOD PRESSURE: 80 MMHG | RESPIRATION RATE: 20 BRPM | OXYGEN SATURATION: 99 % | TEMPERATURE: 96.4 F | HEIGHT: 68 IN | SYSTOLIC BLOOD PRESSURE: 124 MMHG | HEART RATE: 73 BPM

## 2022-07-22 DIAGNOSIS — I10 BENIGN ESSENTIAL HYPERTENSION: Primary | ICD-10-CM

## 2022-07-22 DIAGNOSIS — K76.0 FATTY LIVER: ICD-10-CM

## 2022-07-22 DIAGNOSIS — Z51.81 MEDICATION MONITORING ENCOUNTER: ICD-10-CM

## 2022-07-22 DIAGNOSIS — E78.5 HYPERLIPIDEMIA LDL GOAL <130: ICD-10-CM

## 2022-07-22 DIAGNOSIS — Z87.440 RECENT URINARY TRACT INFECTION: ICD-10-CM

## 2022-07-22 DIAGNOSIS — R73.09 ABNORMAL GLUCOSE: ICD-10-CM

## 2022-07-22 PROCEDURE — 99214 OFFICE O/P EST MOD 30 MIN: CPT | Performed by: FAMILY MEDICINE

## 2022-07-22 RX ORDER — IRBESARTAN AND HYDROCHLOROTHIAZIDE 150; 12.5 MG/1; MG/1
TABLET, FILM COATED ORAL
Qty: 25 TABLET | Refills: 3 | Status: SHIPPED | OUTPATIENT
Start: 2022-07-22 | End: 2023-07-27

## 2022-07-22 NOTE — PROGRESS NOTES
Assessment & Plan       ICD-10-CM    1. Benign essential hypertension  I10 irbesartan-hydrochlorothiazide (AVALIDE) 150-12.5 MG tablet   2. Fatty liver  K76.0    3. Hyperlipidemia LDL goal <130  E78.5    4. Abnormal glucose  R73.09    5. Recent urinary tract infection  Z87.440 UA Macro with Reflex to Micro and Culture - lab collect     Urine Culture Aerobic Bacterial - lab collect   6. Medication monitoring encounter  Z51.81        Discussed treatment/modality options, including risk and benefits, she desires:    1) continue current cares    2) complete bactrim DS    3) continued diet and exercise    4) recheck urine in 2-3 weeks    5) due for CPX after 9/27    All diagnosis above reviewed and noted above, otherwise stable.      See [employer] orders for further details.      Return in about 3 months (around 10/22/2022) for Complete Physical, Medication Recheck Visit, Follow Up Chronic.    No LOS data to display    Doing chart review, history and exam, documentation and further activities as noted.           Paulo Arriola MD, FAAFP     [facility]  [address] [place] [address] [id number] [place], [place] [email or web address]   Office: [phone]  Fax: [phone]  Pager: [phone]       Christopher Hirsch is a 56 year old, presenting for the following health issues:    Recheck UTI - Dr Nj - treated initially with diflucan, then bactrim, no symptoms x 2 days    Recheck hypertension, abnormal glucose, lipids    BP Readings from Last 3 Encounters:   07/22/22 124/80   03/21/22 138/82   01/10/22 (!) 146/94     Creatinine   Date Value Ref Range Status   07/19/2022 0.84 0.52 - 1.04 mg/dL Final   09/21/2020 0.70 0.52 - 1.04 mg/dL Final     GFR Estimate   Date Value Ref Range Status   07/19/2022 82 >60 mL/min/1.73m2 Final     Comment:     Effective December 21, 2021 eGFRcr in adults is calculated using the 2021 CKD-EPI creatinine equation which  includes age and gender (Tram martinez al., NEJM, DOI: 10.1056/TJQVon1790226)   09/21/2020 >90 >60 mL/min/[1.73_m2] Final     Comment:     Non  GFR Calc  Starting 12/18/2018, serum creatinine based estimated GFR (eGFR) will be   calculated using the Chronic Kidney Disease Epidemiology Collaboration   (CKD-EPI) equation.       Glucose   Date Value Ref Range Status   07/19/2022 87 70 - 99 mg/dL Final   12/31/2021 102 (H) 70 - 99 mg/dL Final   09/27/2021 107 (H) 70 - 99 mg/dL Final   09/21/2020 102 (H) 70 - 99 mg/dL Final     Comment:     Fasting specimen   09/09/2019 102 (H) 70 - 99 mg/dL Final     Comment:     Fasting specimen   04/12/2018 90 60 - 109 mg/dL Final   12/27/2017 111 (H) 70 - 99 mg/dL Final     Comment:     Fasting specimen   11/02/2017 102 (H) 70 - 99 mg/dL Final     Comment:     Fasting specimen     Lab Results   Component Value Date    A1C 5.6 07/19/2022    A1C 5.2 03/17/2022    A1C 5.6 04/12/2018    A1C 5.5 07/18/2016    A1C 5.4 09/29/2014     Recent Labs   Lab Test 07/19/22  0715 03/17/22  0722 07/18/16  0834 07/16/15  0823 07/08/14  0815   CHOL 157 204*   < > 165 173   HDL 47* 41*   < > 31* 32*   * 149*   < > 111 120   TRIG 47 70   < > 116 107   CHOLHDLRATIO  --   --   --  5.3* 5.4*    < > = values in this interval not displayed.     Excellent weight loss and exercise    Wt Readings from Last 4 Encounters:   07/22/22 80.3 kg (177 lb)   03/21/22 90.3 kg (199 lb)   01/03/22 88 kg (194 lb)   12/13/21 88 kg (194 lb)     History of Present Illness       Hypertension: She presents for follow up of hypertension.  She does check blood pressure  regularly outside of the clinic. Outpatient blood pressures have not been over 140/90. She follows a low salt diet.       Review of Systems   CONSTITUTIONAL: NEGATIVE for fever, chills, change in weight  INTEGUMENTARY/SKIN: NEGATIVE for worrisome rashes, moles or lesions  EYES: NEGATIVE for vision changes or irritation  ENT/MOUTH: NEGATIVE for ear,  "mouth and throat problems  RESP: NEGATIVE for significant cough or SOB  RESP:NEGATIVE for significant cough or SOB  BREAST: NEGATIVE for masses, tenderness or discharge  CV: NEGATIVE for chest pain, palpitations or peripheral edema  GI: NEGATIVE for nausea, abdominal pain, heartburn, or change in bowel habits  : NEGATIVE for frequency, dysuria, or hematuria  MUSCULOSKELETAL: NEGATIVE for significant arthralgias or myalgia  NEURO: NEGATIVE for weakness, dizziness or paresthesias  ENDOCRINE: NEGATIVE for temperature intolerance, skin/hair changes  HEME: NEGATIVE for bleeding problems  PSYCHIATRIC: NEGATIVE for changes in mood or affect      Objective    /80   Pulse 73   Temp (!) 96.4  F (35.8  C) (Tympanic)   Resp 20   Ht 1.727 m (5' 8\")   Wt 80.3 kg (177 lb)   LMP 10/29/2011 (Exact Date)   SpO2 99%   BMI 26.91 kg/m    Body mass index is 26.91 kg/m .     Physical Exam   GENERAL: healthy, alert and no distress  EYES: Eyes grossly normal to inspection, PERRL and conjunctivae and sclerae normal  HENT: ear canals and TM's normal, nose and mouth without ulcers or lesions  NECK: no adenopathy, no asymmetry, masses, or scars and thyroid normal to palpation  RESP: lungs clear to auscultation - no rales, rhonchi or wheezes  CV: regular rate and rhythm, normal S1 S2, no S3 or S4, no murmur, click or rub, no peripheral edema and peripheral pulses strong  ABDOMEN: soft, nontender, no hepatosplenomegaly, no masses and bowel sounds normal  MS: no gross musculoskeletal defects noted, no edema  SKIN: no suspicious lesions or rashes  NEURO: Normal strength and tone, mentation intact and speech normal  PSYCH: mentation appears normal, affect normal/bright    Recent labs reviewed in great detal    "

## 2022-08-10 ENCOUNTER — LAB (OUTPATIENT)
Dept: LAB | Facility: CLINIC | Age: 57
End: 2022-08-10
Payer: COMMERCIAL

## 2022-08-10 DIAGNOSIS — Z87.440 RECENT URINARY TRACT INFECTION: ICD-10-CM

## 2022-08-10 LAB
ALBUMIN UR-MCNC: NEGATIVE MG/DL
APPEARANCE UR: CLEAR
BILIRUB UR QL STRIP: NEGATIVE
COLOR UR AUTO: YELLOW
GLUCOSE UR STRIP-MCNC: NEGATIVE MG/DL
HGB UR QL STRIP: NEGATIVE
KETONES UR STRIP-MCNC: NEGATIVE MG/DL
LEUKOCYTE ESTERASE UR QL STRIP: NEGATIVE
NITRATE UR QL: NEGATIVE
PH UR STRIP: 5.5 [PH] (ref 5–7)
SP GR UR STRIP: <=1.005 (ref 1–1.03)
UROBILINOGEN UR STRIP-ACNC: 0.2 E.U./DL

## 2022-08-10 PROCEDURE — 81003 URINALYSIS AUTO W/O SCOPE: CPT

## 2022-08-10 PROCEDURE — 87086 URINE CULTURE/COLONY COUNT: CPT

## 2022-08-12 LAB — BACTERIA UR CULT: NORMAL

## 2022-10-23 ENCOUNTER — HEALTH MAINTENANCE LETTER (OUTPATIENT)
Age: 57
End: 2022-10-23

## 2022-10-23 ASSESSMENT — ENCOUNTER SYMPTOMS
SHORTNESS OF BREATH: 0
NAUSEA: 0
FEVER: 0
JOINT SWELLING: 0
BREAST MASS: 0
EYE PAIN: 0
NERVOUS/ANXIOUS: 1
COUGH: 0
HEMATOCHEZIA: 0
WEAKNESS: 0
MYALGIAS: 0
HEADACHES: 0
CHILLS: 0
DIZZINESS: 0
CONSTIPATION: 0
DIARRHEA: 0
DYSURIA: 0
PALPITATIONS: 0
PARESTHESIAS: 0
HEMATURIA: 0
HEARTBURN: 0
SORE THROAT: 0
ABDOMINAL PAIN: 0
ARTHRALGIAS: 0
FREQUENCY: 0

## 2022-10-27 ENCOUNTER — MYC MEDICAL ADVICE (OUTPATIENT)
Dept: INTERNAL MEDICINE | Facility: CLINIC | Age: 57
End: 2022-10-27

## 2022-10-27 ENCOUNTER — OFFICE VISIT (OUTPATIENT)
Dept: INTERNAL MEDICINE | Facility: CLINIC | Age: 57
End: 2022-10-27
Payer: COMMERCIAL

## 2022-10-27 VITALS
OXYGEN SATURATION: 99 % | RESPIRATION RATE: 18 BRPM | WEIGHT: 169 LBS | HEIGHT: 68 IN | SYSTOLIC BLOOD PRESSURE: 161 MMHG | TEMPERATURE: 97.8 F | BODY MASS INDEX: 25.61 KG/M2 | DIASTOLIC BLOOD PRESSURE: 92 MMHG | HEART RATE: 71 BPM

## 2022-10-27 DIAGNOSIS — Z23 NEED FOR PROPHYLACTIC VACCINATION AND INOCULATION AGAINST INFLUENZA: ICD-10-CM

## 2022-10-27 DIAGNOSIS — L30.9 ECZEMA, UNSPECIFIED TYPE: ICD-10-CM

## 2022-10-27 DIAGNOSIS — F41.9 ANXIETY: ICD-10-CM

## 2022-10-27 DIAGNOSIS — E78.5 HYPERLIPIDEMIA LDL GOAL <130: ICD-10-CM

## 2022-10-27 DIAGNOSIS — Z00.00 ENCOUNTER FOR ROUTINE ADULT HEALTH EXAMINATION WITHOUT ABNORMAL FINDINGS: Primary | ICD-10-CM

## 2022-10-27 DIAGNOSIS — K76.0 FATTY LIVER: ICD-10-CM

## 2022-10-27 DIAGNOSIS — I10 BENIGN ESSENTIAL HYPERTENSION: ICD-10-CM

## 2022-10-27 LAB
ANION GAP SERPL CALCULATED.3IONS-SCNC: 6 MMOL/L (ref 3–14)
BUN SERPL-MCNC: 11 MG/DL (ref 7–30)
CALCIUM SERPL-MCNC: 9.6 MG/DL (ref 8.5–10.1)
CHLORIDE BLD-SCNC: 109 MMOL/L (ref 94–109)
CHOLEST SERPL-MCNC: 172 MG/DL
CO2 SERPL-SCNC: 26 MMOL/L (ref 20–32)
CREAT SERPL-MCNC: 0.66 MG/DL (ref 0.52–1.04)
FASTING STATUS PATIENT QL REPORTED: YES
GFR SERPL CREATININE-BSD FRML MDRD: >90 ML/MIN/1.73M2
GLUCOSE BLD-MCNC: 100 MG/DL (ref 70–99)
HDLC SERPL-MCNC: 53 MG/DL
LDLC SERPL CALC-MCNC: 106 MG/DL
NONHDLC SERPL-MCNC: 119 MG/DL
POTASSIUM BLD-SCNC: 4 MMOL/L (ref 3.4–5.3)
SODIUM SERPL-SCNC: 141 MMOL/L (ref 133–144)
TRIGL SERPL-MCNC: 65 MG/DL

## 2022-10-27 PROCEDURE — 36415 COLL VENOUS BLD VENIPUNCTURE: CPT | Performed by: INTERNAL MEDICINE

## 2022-10-27 PROCEDURE — 90682 RIV4 VACC RECOMBINANT DNA IM: CPT | Performed by: INTERNAL MEDICINE

## 2022-10-27 PROCEDURE — 80061 LIPID PANEL: CPT | Performed by: INTERNAL MEDICINE

## 2022-10-27 PROCEDURE — 99396 PREV VISIT EST AGE 40-64: CPT | Mod: 25 | Performed by: INTERNAL MEDICINE

## 2022-10-27 PROCEDURE — 90471 IMMUNIZATION ADMIN: CPT | Performed by: INTERNAL MEDICINE

## 2022-10-27 PROCEDURE — 99214 OFFICE O/P EST MOD 30 MIN: CPT | Mod: 25 | Performed by: INTERNAL MEDICINE

## 2022-10-27 PROCEDURE — 82043 UR ALBUMIN QUANTITATIVE: CPT | Performed by: INTERNAL MEDICINE

## 2022-10-27 PROCEDURE — 80048 BASIC METABOLIC PNL TOTAL CA: CPT | Performed by: INTERNAL MEDICINE

## 2022-10-27 ASSESSMENT — ENCOUNTER SYMPTOMS
HEMATOCHEZIA: 0
PARESTHESIAS: 0
PALPITATIONS: 0
EYE PAIN: 0
WEAKNESS: 0
ARTHRALGIAS: 0
HEADACHES: 0
SORE THROAT: 0
DYSURIA: 0
JOINT SWELLING: 0
CHILLS: 0
CONSTIPATION: 0
BREAST MASS: 0
ABDOMINAL PAIN: 0
HEARTBURN: 0
DIARRHEA: 0
COUGH: 0
SHORTNESS OF BREATH: 0
NERVOUS/ANXIOUS: 1
NAUSEA: 0
MYALGIAS: 0
DIZZINESS: 0
FREQUENCY: 0
HEMATURIA: 0
FEVER: 0

## 2022-10-27 NOTE — PROGRESS NOTES
SUBJECTIVE:   CC: Joycelyn is an 57 year old who presents for preventive health visit.       Patient has been advised of split billing requirements and indicates understanding: Yes  Healthy Habits:     Getting at least 3 servings of Calcium per day:  NO    Bi-annual eye exam:  Yes    Dental care twice a year:  Yes    Sleep apnea or symptoms of sleep apnea:  None    Diet:  Other    Frequency of exercise:  4-5 days/week    Duration of exercise:  30-45 minutes    Taking medications regularly:  Yes    Medication side effects:  None    PHQ-2 Total Score: 2    Additional concerns today:  Yes    Ability to successfully perform activities of daily living: Yes, no assistance needed  Home safety:  none identified   Hearing impairment: No        Hypertension Follow-up  She had been off of medication for over a year, last winter started noticing increased blood pressures.  She saw a provider at another clinic and was put on irbesartan 150-HCTZ 12.5.  She had been having fairly low blood pressures, less than 100  systolic and the dose was decreased to half tablet and still was having lightheadedness so the dose was decreased to 1/4 tablet.  She has been doing well on this with systolics in the  range, diastolics were 60-80.  Over the past few weeks she has been having higher readings that she relates to some anxiety issues.  She is not having lightheadedness now.  She did bring in her home monitor which is reading about 8 points high diastolic compared to our machine.  Her most recent readings are 130s/mid 90s.    Do you check your blood pressure regularly outside of the clinic? Yes     Are you following a low salt diet? Yes    Are your blood pressures ever more than 140 on the top number (systolic) OR more   than 90 on the bottom number (diastolic), for example 140/90? No    Hyperlipidemia: She had a bump on her LDL, has been following diet with good results.    Fatty liver: Liver function tests were checked a few times  in the past year and have been normal.  She has been maintaining weight loss over the past year.      Current concerns:  1.  Anxiety: She believes the pandemic and issues with infection risk have triggered some anxiety that is been bothering her more, especially in the past month or so.  She has an appointment next week to work with a therapist, mostly interested in finding ways of helping control the anxiety.  She is not having panic attacks.    2.  She has scaly patch on her right forearm.  She has not tried anything on it.    3.  Has tinnitus and decreased hearing, wonders if hearing aids might be helpful    4.  She has had some chronic postnasal drainage.       Patient Active Problem List   Diagnosis     Fatty liver     Eczema     Glaucoma     Benign essential hypertension     Hyperlipidemia LDL goal <130     Current Outpatient Medications   Medication Sig Dispense Refill     calcium carbonate (OS-RAINER) 500 MG tablet Take 1 tablet by mouth 2 times daily Patient is taking one tablet three times a week       fish oil-omega-3 fatty acids 1000 MG capsule Take 1 g by mouth every other day  90 capsule 3     Flaxseed, Linseed, (FLAXSEED OIL) 1200 MG CAPS Take by mouth every other day        fluocinonide (LIDEX) 0.05 % cream Apply small amount to affected area bid as needed 30 g 2     irbesartan-hydrochlorothiazide (AVALIDE) 150-12.5 MG tablet 1/4 tab daily 25 tablet 3     latanoprost (XALATAN) 0.005 % ophthalmic solution Place 1 drop into both eyes daily       MULTIVITAMIN TABS   OR             Today's PHQ-2 Score:   PHQ-2 ( 1999 Pfizer) 10/23/2022   Q1: Little interest or pleasure in doing things 1   Q2: Feeling down, depressed or hopeless 1   PHQ-2 Score 2   PHQ-2 Total Score (12-17 Years)- Positive if 3 or more points; Administer PHQ-A if positive -   Q1: Little interest or pleasure in doing things Several days   Q2: Feeling down, depressed or hopeless Several days   PHQ-2 Score 2       Abuse: Current or Past  (Physical, Sexual or Emotional) - No  Do you feel safe in your environment? Yes        Social History     Tobacco Use     Smoking status: Never     Smokeless tobacco: Never   Substance Use Topics     Alcohol use: Yes     Alcohol/week: 0.0 - 1.0 standard drinks         Alcohol Use 10/23/2022   Prescreen: >3 drinks/day or >7 drinks/week? No   Prescreen: >3 drinks/day or >7 drinks/week? -       Reviewed orders with patient.  Reviewed health maintenance and updated orders accordingly - Yes      Breast Cancer Screening:    FHS-7:   Breast CA Risk Assessment (FHS-7) 1/11/2022 10/23/2022   Did any of your first-degree relatives have breast or ovarian cancer? No No   Did any of your relatives have bilateral breast cancer? No No   Did any man in your family have breast cancer? No No   Did any woman in your family have breast and ovarian cancer? No No   Did any woman in your family have breast cancer before age 50 y? No No   Do you have 2 or more relatives with breast and/or ovarian cancer? No No   Do you have 2 or more relatives with breast and/or bowel cancer? No No       Mammogram Screening: Recommended mammography every 1-2 years with patient discussion and risk factor consideration  Pertinent mammograms are reviewed under the imaging tab.    History of abnormal Pap smear: NO - age 30-65 PAP every 5 years with negative HPV co-testing recommended  PAP / HPV Latest Ref Rng & Units 9/9/2019 7/18/2016 3/29/2013   PAP (Historical) - NIL NIL NIL   HPV16 NEG:Negative Negative - -   HPV18 NEG:Negative Negative - -   HRHPV NEG:Negative Negative - -     Reviewed and updated as needed this visit by clinical staff   Tobacco  Allergies  Meds   Med Hx  Surg Hx  Fam Hx  Soc Hx        Reviewed and updated as needed this visit by Provider                     Review of Systems   Constitutional: Negative for chills and fever.   HENT: Positive for hearing loss. Negative for congestion, ear pain and sore throat.    Eyes: Negative for  "pain and visual disturbance.   Respiratory: Negative for cough and shortness of breath.    Cardiovascular: Negative for chest pain, palpitations and peripheral edema.   Gastrointestinal: Negative for abdominal pain, constipation, diarrhea, heartburn, hematochezia and nausea.   Breasts:  Negative for tenderness, breast mass and discharge.   Genitourinary: Negative for dysuria, frequency, genital sores, hematuria, pelvic pain, urgency, vaginal bleeding and vaginal discharge.   Musculoskeletal: Negative for arthralgias, joint swelling and myalgias.   Skin: Negative for rash.   Neurological: Negative for dizziness, weakness, headaches and paresthesias.   Psychiatric/Behavioral: Negative for mood changes. The patient is nervous/anxious.    She can occasionally feel a pulling or pressure across her upper abdomen, it goes away when she stretches.       OBJECTIVE:   BP (!) 161/92   Pulse 71   Temp 97.8  F (36.6  C)   Resp 18   Ht 1.727 m (5' 8\")   Wt 76.7 kg (169 lb)   LMP 10/29/2011 (Exact Date)   SpO2 99%   BMI 25.70 kg/m    Physical Exam    HEENT: extraocular movements are intact, pupils equal and reactive to light and accommodation, TMs clear  NECK: Neck supple. No adenopathy. Thyroid symmetric, normal size,, Carotids without bruits.  PULMONARY: clear to auscultation  CARDIAC: regular rate and rhythm and no murmurs, clicks, or gallops  PULSES: 2/2 throughout  BACK: no spinal or CVAT  ABDOMINAL: Soft, nontender.  Normal bowel sounds.  No hepatosplenomegaly or abnormal masses  BREAST: No breast masses or tenderness, No axillary masses or tenderness and No galactorrhea  REFLEXES: 2+ throughout  SKIN: Slightly scaly patch on the right forearm approximately 1 cm across         ASSESSMENT/PLAN:   (Z00.00) Encounter for routine adult health examination without abnormal findings  (primary encounter diagnosis)  Comment: She will do flu shot today, she would like to wait and do her COVID shot later.  Otherwise up-to-date. " " She was given a stool fit test last year despite having a colonoscopy in 2017.  She reports she had a brother who had most of his colon removed and she thought it was due to some precancerous issue  Plan: Suggest she contact her brother to get more information, suspect the more likely had a colitis type issue rather than polyps.  If so then she can go 10 years between colonoscopies, if he did have polyps or cancer, then she would do a colonoscopy this year.    (I10) Benign essential hypertension  Comment: Blood pressure elevation today probably is related to her anxiety.  As long as her labs are okay, we will have her monitor this and work with the anxiety see if it comes down.  In the future would probably consider changing her back to plain irbesartan 75 so she does not have to cut pills into quarter tablets  Plan: Basic metabolic panel  (Ca, Cl, CO2, Creat,         Gluc, K, Na, BUN), Albumin Random Urine         Quantitative with Creat Ratio            (E78.5) Hyperlipidemia LDL goal <130  Comment: We will recheck labs to ensure diet and weight loss is continuing to have benefit  Plan: Lipid panel reflex to direct LDL Fasting            (F41.9) Anxiety  Comment: Symptoms are not severe, agree with plan for counseling.  Plan: I did provide her with information about a phone destini and a workbook    (L30.9) Eczema, unspecified type  Comment: Advised the patch on her arm may be of mild eczema change, she can try using her cream on it  Plan: Follow-up with dermatology in January if not resolving    (K76.0) Fatty liver  Comment: Labs have been good, has had good weight loss  Plan: Recheck next year    Patient has been advised of split billing requirements and indicates understanding: Yes      COUNSELING:  Reviewed preventive health counseling, as reflected in patient instructions    Estimated body mass index is 26.91 kg/m  as calculated from the following:    Height as of 7/22/22: 1.727 m (5' 8\").    Weight as of " 7/22/22: 80.3 kg (177 lb).    Weight management plan: Discussed healthy diet and exercise guidelines    She reports that she has never smoked. She has never used smokeless tobacco.      Counseling Resources:  ATP IV Guidelines  Pooled Cohorts Equation Calculator  Breast Cancer Risk Calculator  BRCA-Related Cancer Risk Assessment: FHS-7 Tool  FRAX Risk Assessment  ICSI Preventive Guidelines  Dietary Guidelines for Americans, 2010  USDA's MyPlate  ASA Prophylaxis  Lung CA Screening    Gauri Early MD  [facility]

## 2022-10-28 LAB
CREAT UR-MCNC: 158 MG/DL
MICROALBUMIN UR-MCNC: 17 MG/L
MICROALBUMIN/CREAT UR: 10.76 MG/G CR (ref 0–25)

## 2022-11-29 ENCOUNTER — ALLIED HEALTH/NURSE VISIT (OUTPATIENT)
Dept: FAMILY MEDICINE | Facility: CLINIC | Age: 57
End: 2022-11-29
Payer: COMMERCIAL

## 2022-11-29 DIAGNOSIS — Z23 HIGH PRIORITY FOR 2019-NCOV VACCINE: Primary | ICD-10-CM

## 2022-11-29 PROCEDURE — 0124A COVID-19 VACCINE BIVALENT BOOSTER 12+ (PFIZER): CPT

## 2022-11-29 PROCEDURE — 91312 COVID-19 VACCINE BIVALENT BOOSTER 12+ (PFIZER): CPT

## 2023-01-16 ENCOUNTER — HOSPITAL ENCOUNTER (OUTPATIENT)
Dept: MAMMOGRAPHY | Facility: CLINIC | Age: 58
Discharge: HOME | End: 2023-01-16
Attending: FAMILY MEDICINE | Admitting: FAMILY MEDICINE
Payer: COMMERCIAL

## 2023-01-16 DIAGNOSIS — Z12.31 VISIT FOR SCREENING MAMMOGRAM: ICD-10-CM

## 2023-01-16 PROCEDURE — 77067 SCR MAMMO BI INCL CAD: CPT

## 2023-01-18 ENCOUNTER — OFFICE VISIT (OUTPATIENT)
Dept: FAMILY MEDICINE | Facility: CLINIC | Age: 58
End: 2023-01-18
Payer: COMMERCIAL

## 2023-01-18 DIAGNOSIS — L82.1 SEBORRHEIC KERATOSES: ICD-10-CM

## 2023-01-18 DIAGNOSIS — D22.9 MULTIPLE BENIGN NEVI: ICD-10-CM

## 2023-01-18 DIAGNOSIS — Z12.83 ENCOUNTER FOR SCREENING FOR MALIGNANT NEOPLASM OF SKIN: ICD-10-CM

## 2023-01-18 DIAGNOSIS — L81.4 LENTIGINES: ICD-10-CM

## 2023-01-18 DIAGNOSIS — L30.9 ECZEMA, UNSPECIFIED TYPE: Primary | ICD-10-CM

## 2023-01-18 DIAGNOSIS — D18.01 CHERRY ANGIOMA: ICD-10-CM

## 2023-01-18 DIAGNOSIS — L30.9 HAND DERMATITIS: ICD-10-CM

## 2023-01-18 PROCEDURE — 99214 OFFICE O/P EST MOD 30 MIN: CPT | Performed by: NURSE PRACTITIONER

## 2023-01-18 RX ORDER — TRIAMCINOLONE ACETONIDE 1 MG/G
OINTMENT TOPICAL 2 TIMES DAILY
Qty: 80 G | Refills: 1 | Status: SHIPPED | OUTPATIENT
Start: 2023-01-18

## 2023-01-18 ASSESSMENT — PAIN SCALES - GENERAL: PAINLEVEL: NO PAIN (0)

## 2023-01-18 NOTE — PROGRESS NOTES
[facility] Dermatology Note  Encounter Date: Jan 18, 2023  Office Visit     Reviewed patients past medical history and pertinent chart review prior to patients visit today.     Dermatology Problem List:  Hand dermatitis and eczema. Triamcinolone 0.1% ointment given 01/18/23   Patient denies personal history of skin cancer or dysplastic nevi.      Family history of BCC in brother  ____________________________________________    Assessment & Plan:     # Hand dermatitis.   -triamcinolone 0.1% ointment twice daily to rashy areas on hands until resolved and then BID PRN for flares.  Councled about use and side effects of thinning of the skin, striae, erythema, and worsening of rash.   Not for use in places other than hands or feet.   -apply steroid ointment at night to rashy areas, and petroleum jelly to the rest of the hands. Cover with cotton gloves overnight during sleep.   -reapply another time during the day when patient can keep on for 1+ hours without washing hands  -wear gloves when washing dishes, using cleaning supplies, or when hands would otherwise be immersed in soapy water.   -each time patient washes hands they should apply a moisturizing cream immediately afterwards. Examples include Cetaphil cream, Cera Ve Cream or Vanicream.      # Eczematous dermatitis    -Start triamcinolone 0.1% ointment twice daily for 2 weeks, decreasing as patients rash improves, repeat cycle for flares. If not fully resolved in 1 month, patient advised to return to clinic for reevaluation. Discussed risk of skin atrophy with long term chronic use. Not for face, armpits or groin.   -When bathing, use gentle soap such as Dove for Sensitive Skin and lukewarm water on amrpits, groin, and other visibly dirty areas, all other areas let the water run over but no soap is necessary. Pat skin dry instead of vigorous rubbing. Encouraged regular use of an emollient such as Vanicream, Cera Ve Cream or Cetaphil Cream to the  entire body.   -Start a humidifier in bedroom when sleeping if possible for patient. Clean regularly.     # Benign skin findings including: seborrheic keratoses, cherry angioma, lentigines and benign nevi.   - No further intervention required. Patient to report changes.   - Patient reassured of the benign nature of these lesions.    #Signs and Symptoms of non-melanoma skin cancer and ABCDEs of melanoma reviewed with patient. Patient encouraged to perform monthly self skin exams and educated on how to perform them. UV precautions reviewed with patient. Patient was asked about new or changing moles/lesions on body.     #Reviewed Sunscreen: Apply 20 minutes prior to going outdoors and reapply every two hours, when wet or sweating. We recommend using an SPF 30 or higher, and to use one that is water resistant.       Follow-up:  1-2 years for follow up full body skin exam, prn for new or changing lesions or new concerns    Kylee Garcia, APRN CNP on 1/18/2023 at 7:32 AM    ____________________________________________    [place] [place]    HPI:  Ms. Joycelyn Laird is a(n) 57 year old female who presents today as a return patient for a full body skin cancer screening. Patient has concerns today about rash on her hands and forearms that she has had for a few months. She uses Udder cream to her hands most evenings.     Patient is otherwise feeling well, without additional skin concerns.     Physical Exam:  Vitals: LMP 10/29/2011 (Exact Date)   SKIN: Total skin excluding the genitalia areas was performed. The exam included the head/face, neck, both arms, chest, back, abdomen, both legs, digits, mons pubis, buttock and nails.   -dorsal hands and forearms have dry, erythematous ill defined plaques and dorsum hands have mild superficial fissuring  -several 1-2mm red dome shaped symmetric papules scattered on the trunk  -multiple tan/brown flat round macules and raised papules scattered  throughout trunk, extremities and head. No worrisome features for malignancy noted on examination.  -scattered tan, homogenous macules scattered on sun exposed areas of trunk, extremities and face.   -very few thin waxy, stuck on tan/brown papules on the trunk     - No other lesions of concern on areas examined.     Medications:  Current Outpatient Medications   Medication     calcium carbonate (OS-RAINER) 500 MG tablet     fish oil-omega-3 fatty acids 1000 MG capsule     Flaxseed, Linseed, (FLAXSEED OIL) 1200 MG CAPS     fluocinonide (LIDEX) 0.05 % cream     irbesartan-hydrochlorothiazide (AVALIDE) 150-12.5 MG tablet     latanoprost (XALATAN) 0.005 % ophthalmic solution     MULTIVITAMIN TABS   OR     triamcinolone (KENALOG) 0.1 % external ointment     No current facility-administered medications for this visit.      Past Medical History:   Patient Active Problem List   Diagnosis     Fatty liver     Eczema     Glaucoma     Benign essential hypertension     Hyperlipidemia LDL goal <130     Past Medical History:   Diagnosis Date     Eczema 07/18/2016     Fatty liver 2013     Glaucoma     Dr NICOLA Lofton     Hyperlipidemia LDL goal <130      Hypertension goal BP (blood pressure) < 140/90      Mild or unspecified pre-eclampsia, unspecified as to episode of care 07/1999    toxemia pregnancy-twins     Primary localized osteoarthrosis, ankle and foot 10/18/2011       CC Referred Self, MD  No address on file on close of this encounter.

## 2023-01-18 NOTE — LETTER
1/18/2023         RE: Joycelyn Laird  [address] [facility] [address]        Dear Colleague,    Thank you for referring your patient, Joycelyn Laird, to the [facility]. Please see a copy of my visit note below.    [facility] Dermatology Note  Encounter Date: Jan 18, 2023  Office Visit     Reviewed patients past medical history and pertinent chart review prior to patients visit today.     Dermatology Problem List:  Hand dermatitis and eczema. Triamcinolone 0.1% ointment given 01/18/23   Patient denies personal history of skin cancer or dysplastic nevi.      Family history of BCC in brother  ____________________________________________    Assessment & Plan:     # Hand dermatitis.   -triamcinolone 0.1% ointment twice daily to rashy areas on hands until resolved and then BID PRN for flares.  Councled about use and side effects of thinning of the skin, striae, erythema, and worsening of rash.   Not for use in places other than hands or feet.   -apply steroid ointment at night to rashy areas, and petroleum jelly to the rest of the hands. Cover with cotton gloves overnight during sleep.   -reapply another time during the day when patient can keep on for 1+ hours without washing hands  -wear gloves when washing dishes, using cleaning supplies, or when hands would otherwise be immersed in soapy water.   -each time patient washes hands they should apply a moisturizing cream immediately afterwards. Examples include Cetaphil cream, Cera Ve Cream or Vanicream.      # Eczematous dermatitis    -Start triamcinolone 0.1% ointment twice daily for 2 weeks, decreasing as patients rash improves, repeat cycle for flares. If not fully resolved in 1 month, patient advised to return to clinic for reevaluation. Discussed risk of skin atrophy with long term chronic use. Not for face, armpits or groin.   -When bathing, use gentle soap such as Dove for Sensitive Skin and lukewarm water  on amrpits, groin, and other visibly dirty areas, all other areas let the water run over but no soap is necessary. Pat skin dry instead of vigorous rubbing. Encouraged regular use of an emollient such as Vanicream, Cera Ve Cream or Cetaphil Cream to the entire body.   -Start a humidifier in bedroom when sleeping if possible for patient. Clean regularly.     # Benign skin findings including: seborrheic keratoses, cherry angioma, lentigines and benign nevi.   - No further intervention required. Patient to report changes.   - Patient reassured of the benign nature of these lesions.    #Signs and Symptoms of non-melanoma skin cancer and ABCDEs of melanoma reviewed with patient. Patient encouraged to perform monthly self skin exams and educated on how to perform them. UV precautions reviewed with patient. Patient was asked about new or changing moles/lesions on body.     #Reviewed Sunscreen: Apply 20 minutes prior to going outdoors and reapply every two hours, when wet or sweating. We recommend using an SPF 30 or higher, and to use one that is water resistant.       Follow-up:  1-2 years for follow up full body skin exam, prn for new or changing lesions or new concerns    Kylee Garcia, APRN CNP on 1/18/2023 at 7:32 AM    ____________________________________________    CC: Skin Check ([facility] [place]/o itchy rash inner forarm)    HPI:  Ms. Joycelyn Laird is a(n) 57 year old female who presents today as a return patient for a full body skin cancer screening. Patient has concerns today about rash on her hands and forearms that she has had for a few months. She uses Udder cream to her hands most evenings.     Patient is otherwise feeling well, without additional skin concerns.     Physical Exam:  Vitals: LMP 10/29/2011 (Exact Date)   SKIN: Total skin excluding the genitalia areas was performed. The exam included the head/face, neck, both arms, chest, back, abdomen, both legs, digits, mons pubis, buttock and nails.   -dorsal  hands and forearms have dry, erythematous ill defined plaques and dorsum hands have mild superficial fissuring  -several 1-2mm red dome shaped symmetric papules scattered on the trunk  -multiple tan/brown flat round macules and raised papules scattered throughout trunk, extremities and head. No worrisome features for malignancy noted on examination.  -scattered tan, homogenous macules scattered on sun exposed areas of trunk, extremities and face.   -very few thin waxy, stuck on tan/brown papules on the trunk     - No other lesions of concern on areas examined.     Medications:  Current Outpatient Medications   Medication     calcium carbonate (OS-RAINER) 500 MG tablet     fish oil-omega-3 fatty acids 1000 MG capsule     Flaxseed, Linseed, (FLAXSEED OIL) 1200 MG CAPS     fluocinonide (LIDEX) 0.05 % cream     irbesartan-hydrochlorothiazide (AVALIDE) 150-12.5 MG tablet     latanoprost (XALATAN) 0.005 % ophthalmic solution     MULTIVITAMIN TABS   OR     triamcinolone (KENALOG) 0.1 % external ointment     No current facility-administered medications for this visit.      Past Medical History:   Patient Active Problem List   Diagnosis     Fatty liver     Eczema     Glaucoma     Benign essential hypertension     Hyperlipidemia LDL goal <130     Past Medical History:   Diagnosis Date     Eczema 07/18/2016     Fatty liver 2013     Glaucoma     Dr NICOLA Lofton     Hyperlipidemia LDL goal <130      Hypertension goal BP (blood pressure) < 140/90      Mild or unspecified pre-eclampsia, unspecified as to episode of care 07/1999    toxemia pregnancy-twins     Primary localized osteoarthrosis, ankle and foot 10/18/2011       CC Referred Self, MD  No address on file on close of this encounter.      Again, thank you for allowing me to participate in the care of your patient.        Sincerely,        Kylee Garcia, STEPHANIE CNP

## 2023-01-18 NOTE — PATIENT INSTRUCTIONS
Hand dermatitis.   -I have prescribed a steroid ointment called betamethasone augmented formula  0.05% ointment to use twice daily until resolved and then as needed for flares.    -at night, soak hands in water, apply steroid ointment to rashy areas, and a thick layer of petroleum jelly/Vaseline to the rest of the hands. Cover with cotton gloves overnight during sleep.   -reapply another time during the day when you can keep on for 1+ hours without washing hands  -wear gloves when washing dishes (currently cotton lined gloves or use your cotton gloves underneath your rubber gloves as a barrier), using cleaning supplies, or when hands would otherwise be immersed in soapy water.   -each time you wash your hands you should apply a moisturizing cream immediately afterwards. Examples include Cetaphil cream, Cera Ve Cream, Vanicream or vaseline  -Use a very mild soap such as dove bar soap or even better would be vanicream bar soap. Cut a bar into slices so you can have one in each area that your wash your hands so that she did not to come in contact with any harsh soaps that may be very drying to the hands or cause an allergy.  -The very mindful about anything this coming in contact with your hands that may cause an allergy.  Do not put any moisturizers or chemicals or products on your hands other than things listed above in case you are allergic to something that you are putting on your hands.  Try to avoid contact with rubbers and latex in case this is a problem as well.     Dry Skin    What is dry skin?  Common skin problem  Can be worse during the winter   Affects all ages  Occurs in people with or without other skin problems    What does it look like?  Fine lines in the skin become more visible   Rough feeling skin   Flaky skin  Most common on the arms and legs  Skin can become cracked, especially on the hands and feet    What are some problems caused by dry skin?   Itching  Rubbing or scratching can cause thickened,  rough skin patches  Cracks in skin can be painful  Red, itchy, scaly skin (called eczema) can occur  Yellow crusting or pus could be signs of an infection    What causes dry skin?  A lack of water in the top layer of the skin  Too much soapy water,  hot water, or harsh chemicals  Aging and sun damage    How do I treat dry skin?  Shower or bathe daily for under ten minutes with lukewarm water and mild soap.  Pat yourself dry with a towel gently and leave your skin slightly damp.  Use moisturizing cream or ointment right away.  Avoid lotions.    What kind of mild soap should I be using?  Camay , Dove , Tone , Neutrogena , Purpose , or Oil of Olay   A non-detergent cleanser, like Cetaphil , can be used.    What should I stay away from?  Scented soaps   Bath oils    What moisturizers should I be using?  Cetaphil Cream,CeraVe Cream, Vanicream, Aquaphilic, Eucerin, Aquaphor, or Vaseline   Always apply after showering or bathing.  Reapply throughout the day, if possible.  If dry skin affects your hands, always reapply after handwashing.    What else should I know?  Using a humidifier during winter months may help.  If dry skin gets worse or if eczema develops, a steroid cream may be needed.

## 2023-05-23 NOTE — PATIENT INSTRUCTIONS
--Saline nasal spray or a maryam pot can be helpful in clearing out the sinuses and making them feel better. Also, sleep propped up on an extra pillow to help with drainage.  --Using a humidifier at night will also add moisture to the air and help with symptoms.  --Guaifenesen(Mucinex 12 hour) can be used to help loosen and thin mucus. Take as directed.  --Avoid use of other over the counter medications, ideally we want mucus to drain to prevent further infection from developing.  --Ibuprofen or Tylenol as directed is ok for headache or sinus pressure.      Report given to OMID Bueno

## 2023-07-27 ENCOUNTER — MYC MEDICAL ADVICE (OUTPATIENT)
Dept: FAMILY MEDICINE | Facility: CLINIC | Age: 58
End: 2023-07-27
Payer: COMMERCIAL

## 2023-07-27 DIAGNOSIS — I10 BENIGN ESSENTIAL HYPERTENSION: ICD-10-CM

## 2023-07-27 RX ORDER — IRBESARTAN AND HYDROCHLOROTHIAZIDE 150; 12.5 MG/1; MG/1
TABLET, FILM COATED ORAL
Qty: 25 TABLET | Refills: 1 | Status: SHIPPED | OUTPATIENT
Start: 2023-07-27 | End: 2023-11-07

## 2023-07-27 RX ORDER — IRBESARTAN AND HYDROCHLOROTHIAZIDE 150; 12.5 MG/1; MG/1
TABLET, FILM COATED ORAL
Qty: 25 TABLET | Refills: 0 | Status: SHIPPED | OUTPATIENT
Start: 2023-07-27 | End: 2023-11-07

## 2023-07-27 NOTE — TELEPHONE ENCOUNTER
Routing refill request to provider for review/approval because:  BP Readings from Last 3 Encounters:   10/27/22 (!) 161/92   07/22/22 124/80   03/21/22 138/82

## 2023-08-01 ENCOUNTER — MYC MEDICAL ADVICE (OUTPATIENT)
Dept: FAMILY MEDICINE | Facility: CLINIC | Age: 58
End: 2023-08-01
Payer: COMMERCIAL

## 2023-08-01 DIAGNOSIS — H90.3 SENSORINEURAL HEARING LOSS (SNHL) OF BOTH EARS: Primary | ICD-10-CM

## 2023-08-01 NOTE — TELEPHONE ENCOUNTER
Please see my chart message below     Please review and advise     Thank you     Elisabet Parrish RN, BSN  [facility]

## 2023-08-02 NOTE — TELEPHONE ENCOUNTER
Joycelyn    Let's have you see ENT - Dr Betito Mijares at [address] - [phone]    I placed a referral, please call them for an appointment    Thanks    Paulo

## 2023-09-11 ENCOUNTER — TRANSFERRED RECORDS (OUTPATIENT)
Dept: HEALTH INFORMATION MANAGEMENT | Facility: CLINIC | Age: 58
End: 2023-09-11
Payer: COMMERCIAL

## 2023-11-03 ASSESSMENT — ENCOUNTER SYMPTOMS
DIARRHEA: 0
WEAKNESS: 0
ARTHRALGIAS: 0
NERVOUS/ANXIOUS: 0
NAUSEA: 0
HEMATOCHEZIA: 0
CHILLS: 0
BREAST MASS: 0
PARESTHESIAS: 0
DYSURIA: 0
DIZZINESS: 0
SHORTNESS OF BREATH: 0
COUGH: 0
HEADACHES: 0
EYE PAIN: 0
FREQUENCY: 0
JOINT SWELLING: 0
ABDOMINAL PAIN: 0
FEVER: 0
SORE THROAT: 0
MYALGIAS: 0
CONSTIPATION: 0
HEARTBURN: 0
HEMATURIA: 0
PALPITATIONS: 0

## 2023-11-07 ENCOUNTER — TELEPHONE (OUTPATIENT)
Dept: GASTROENTEROLOGY | Facility: CLINIC | Age: 58
End: 2023-11-07

## 2023-11-07 ENCOUNTER — OFFICE VISIT (OUTPATIENT)
Dept: FAMILY MEDICINE | Facility: CLINIC | Age: 58
End: 2023-11-07
Payer: COMMERCIAL

## 2023-11-07 VITALS
TEMPERATURE: 98 F | HEIGHT: 68 IN | SYSTOLIC BLOOD PRESSURE: 130 MMHG | OXYGEN SATURATION: 97 % | DIASTOLIC BLOOD PRESSURE: 80 MMHG | RESPIRATION RATE: 16 BRPM | HEART RATE: 86 BPM | BODY MASS INDEX: 29.25 KG/M2 | WEIGHT: 193 LBS

## 2023-11-07 DIAGNOSIS — H90.3 SENSORINEURAL HEARING LOSS (SNHL) OF BOTH EARS: ICD-10-CM

## 2023-11-07 DIAGNOSIS — Z51.81 MEDICATION MONITORING ENCOUNTER: ICD-10-CM

## 2023-11-07 DIAGNOSIS — I10 BENIGN ESSENTIAL HYPERTENSION: ICD-10-CM

## 2023-11-07 DIAGNOSIS — Z12.11 SCREEN FOR COLON CANCER: ICD-10-CM

## 2023-11-07 DIAGNOSIS — Z13.820 SCREENING FOR OSTEOPOROSIS: ICD-10-CM

## 2023-11-07 DIAGNOSIS — Z83.79 FAMILY HISTORY OF CROHN'S DISEASE: ICD-10-CM

## 2023-11-07 DIAGNOSIS — Z12.31 ENCOUNTER FOR SCREENING MAMMOGRAM FOR MALIGNANT NEOPLASM OF BREAST: ICD-10-CM

## 2023-11-07 DIAGNOSIS — E78.5 HYPERLIPIDEMIA LDL GOAL <100: ICD-10-CM

## 2023-11-07 DIAGNOSIS — Z23 NEED FOR COVID-19 VACCINE: ICD-10-CM

## 2023-11-07 DIAGNOSIS — R73.03 PREDIABETES: ICD-10-CM

## 2023-11-07 DIAGNOSIS — L30.9 ECZEMA, UNSPECIFIED TYPE: ICD-10-CM

## 2023-11-07 DIAGNOSIS — Z00.00 ROUTINE GENERAL MEDICAL EXAMINATION AT A HEALTH CARE FACILITY: Primary | ICD-10-CM

## 2023-11-07 DIAGNOSIS — Z23 NEED FOR INFLUENZA VACCINATION: ICD-10-CM

## 2023-11-07 DIAGNOSIS — H40.9 GLAUCOMA OF BOTH EYES, UNSPECIFIED GLAUCOMA TYPE: ICD-10-CM

## 2023-11-07 DIAGNOSIS — K76.0 FATTY LIVER: ICD-10-CM

## 2023-11-07 LAB
ALBUMIN SERPL BCG-MCNC: 4.4 G/DL (ref 3.5–5.2)
ALBUMIN UR-MCNC: NEGATIVE MG/DL
ALP SERPL-CCNC: 56 U/L (ref 35–104)
ALT SERPL W P-5'-P-CCNC: 19 U/L (ref 0–50)
ANION GAP SERPL CALCULATED.3IONS-SCNC: 11 MMOL/L (ref 7–15)
APPEARANCE UR: CLEAR
AST SERPL W P-5'-P-CCNC: 36 U/L (ref 0–45)
BILIRUB SERPL-MCNC: 0.6 MG/DL
BILIRUB UR QL STRIP: NEGATIVE
BUN SERPL-MCNC: 14.9 MG/DL (ref 6–20)
CALCIUM SERPL-MCNC: 9.7 MG/DL (ref 8.6–10)
CHLORIDE SERPL-SCNC: 105 MMOL/L (ref 98–107)
CHOLEST SERPL-MCNC: 179 MG/DL
CK SERPL-CCNC: 493 U/L (ref 26–192)
COLOR UR AUTO: YELLOW
CREAT SERPL-MCNC: 0.65 MG/DL (ref 0.51–0.95)
CREAT UR-MCNC: 163 MG/DL
DEPRECATED HCO3 PLAS-SCNC: 24 MMOL/L (ref 22–29)
EGFRCR SERPLBLD CKD-EPI 2021: >90 ML/MIN/1.73M2
ERYTHROCYTE [DISTWIDTH] IN BLOOD BY AUTOMATED COUNT: 11.7 % (ref 10–15)
GLUCOSE SERPL-MCNC: 104 MG/DL (ref 70–99)
GLUCOSE UR STRIP-MCNC: NEGATIVE MG/DL
HBA1C MFR BLD: 5.4 % (ref 0–5.6)
HCT VFR BLD AUTO: 38.9 % (ref 35–47)
HDLC SERPL-MCNC: 48 MG/DL
HGB BLD-MCNC: 13.3 G/DL (ref 11.7–15.7)
HGB UR QL STRIP: ABNORMAL
KETONES UR STRIP-MCNC: NEGATIVE MG/DL
LDLC SERPL CALC-MCNC: 119 MG/DL
LEUKOCYTE ESTERASE UR QL STRIP: ABNORMAL
MCH RBC QN AUTO: 29.8 PG (ref 26.5–33)
MCHC RBC AUTO-ENTMCNC: 34.2 G/DL (ref 31.5–36.5)
MCV RBC AUTO: 87 FL (ref 78–100)
MICROALBUMIN UR-MCNC: 15.9 MG/L
MICROALBUMIN/CREAT UR: 9.75 MG/G CR (ref 0–25)
NITRATE UR QL: NEGATIVE
NONHDLC SERPL-MCNC: 131 MG/DL
PH UR STRIP: 6.5 [PH] (ref 5–7)
PLATELET # BLD AUTO: 234 10E3/UL (ref 150–450)
POTASSIUM SERPL-SCNC: 4.3 MMOL/L (ref 3.4–5.3)
PROT SERPL-MCNC: 7.4 G/DL (ref 6.4–8.3)
RBC # BLD AUTO: 4.46 10E6/UL (ref 3.8–5.2)
RBC #/AREA URNS AUTO: NORMAL /HPF
SODIUM SERPL-SCNC: 140 MMOL/L (ref 135–145)
SP GR UR STRIP: >=1.03 (ref 1–1.03)
TRIGL SERPL-MCNC: 59 MG/DL
TSH SERPL DL<=0.005 MIU/L-ACNC: 1.97 UIU/ML (ref 0.3–4.2)
UROBILINOGEN UR STRIP-ACNC: 0.2 E.U./DL
WBC # BLD AUTO: 4.3 10E3/UL (ref 4–11)
WBC #/AREA URNS AUTO: NORMAL /HPF

## 2023-11-07 PROCEDURE — 82043 UR ALBUMIN QUANTITATIVE: CPT | Performed by: FAMILY MEDICINE

## 2023-11-07 PROCEDURE — 80061 LIPID PANEL: CPT | Performed by: FAMILY MEDICINE

## 2023-11-07 PROCEDURE — 99213 OFFICE O/P EST LOW 20 MIN: CPT | Mod: 25 | Performed by: FAMILY MEDICINE

## 2023-11-07 PROCEDURE — 81001 URINALYSIS AUTO W/SCOPE: CPT | Performed by: FAMILY MEDICINE

## 2023-11-07 PROCEDURE — 82570 ASSAY OF URINE CREATININE: CPT | Performed by: FAMILY MEDICINE

## 2023-11-07 PROCEDURE — 90480 ADMN SARSCOV2 VAC 1/ONLY CMP: CPT | Performed by: FAMILY MEDICINE

## 2023-11-07 PROCEDURE — 90471 IMMUNIZATION ADMIN: CPT | Performed by: FAMILY MEDICINE

## 2023-11-07 PROCEDURE — 80053 COMPREHEN METABOLIC PANEL: CPT | Performed by: FAMILY MEDICINE

## 2023-11-07 PROCEDURE — 99396 PREV VISIT EST AGE 40-64: CPT | Mod: 25 | Performed by: FAMILY MEDICINE

## 2023-11-07 PROCEDURE — 83036 HEMOGLOBIN GLYCOSYLATED A1C: CPT | Performed by: FAMILY MEDICINE

## 2023-11-07 PROCEDURE — 85027 COMPLETE CBC AUTOMATED: CPT | Performed by: FAMILY MEDICINE

## 2023-11-07 PROCEDURE — 90682 RIV4 VACC RECOMBINANT DNA IM: CPT | Performed by: FAMILY MEDICINE

## 2023-11-07 PROCEDURE — 91320 SARSCV2 VAC 30MCG TRS-SUC IM: CPT | Performed by: FAMILY MEDICINE

## 2023-11-07 PROCEDURE — 84443 ASSAY THYROID STIM HORMONE: CPT | Performed by: FAMILY MEDICINE

## 2023-11-07 PROCEDURE — 82550 ASSAY OF CK (CPK): CPT | Performed by: FAMILY MEDICINE

## 2023-11-07 PROCEDURE — 36415 COLL VENOUS BLD VENIPUNCTURE: CPT | Performed by: FAMILY MEDICINE

## 2023-11-07 RX ORDER — IRBESARTAN AND HYDROCHLOROTHIAZIDE 150; 12.5 MG/1; MG/1
TABLET, FILM COATED ORAL
Qty: 25 TABLET | Refills: 3 | Status: SHIPPED | OUTPATIENT
Start: 2023-11-07 | End: 2024-04-10

## 2023-11-07 NOTE — PROGRESS NOTES
[facility]  [place]    SUBJECTIVE    CC: Joycelyn is an 58 year old who presents for preventive health visit.          No data to display                Healthy Habits:     Getting at least 3 servings of Calcium per day:  NO    Bi-annual eye exam:  Yes    Dental care twice a year:  Yes    Sleep apnea or symptoms of sleep apnea:  None    Diet:  Regular (no restrictions)    Frequency of exercise:  2-3 days/week    Duration of exercise:  15-30 minutes    Taking medications regularly:  Yes    Medication side effects:  Not applicable    Additional concerns today:  Yes      Today's PHQ-2 Score:       11/7/2023     7:09 AM   PHQ-2 ( 1999 Pfizer)   Q1: Little interest or pleasure in doing things 0   Q2: Feeling down, depressed or hopeless 1   PHQ-2 Score 1   Q1: Little interest or pleasure in doing things Not at all   Q2: Feeling down, depressed or hopeless Several days   PHQ-2 Score 1     Hypertension Follow-up    Do you check your blood pressure regularly outside of the clinic? No   Are you following a low salt diet? Yes  Are your blood pressures ever more than 140 on the top number (systolic) OR more   than 90 on the bottom number (diastolic), for example 140/90? Does not check    BP Readings from Last 3 Encounters:   11/07/23 130/80   10/27/22 (!) 161/92   07/22/22 124/80     Creatinine   Date Value Ref Range Status   10/27/2022 0.66 0.52 - 1.04 mg/dL Final   09/21/2020 0.70 0.52 - 1.04 mg/dL Final     GFR Estimate   Date Value Ref Range Status   10/27/2022 >90 >60 mL/min/1.73m2 Final     Comment:     Effective December 21, 2021 eGFRcr in adults is calculated using the 2021 CKD-EPI creatinine equation which includes age and gender (Tram et al., NEJM, DOI: 10.1056/CZVOte6261816)   09/21/2020 >90 >60 mL/min/[1.73_m2] Final     Comment:     Non  GFR Calc  Starting 12/18/2018, serum creatinine based estimated GFR (eGFR) will be   calculated using the Chronic Kidney Disease Epidemiology Collaboration    (CKD-EPI) equation.       Lipids    Recent Labs   Lab Test 10/27/22  0756 07/19/22  0715   CHOL 172 157   HDL 53 47*   * 101*   TRIG 65 47     Fatty Liver    Prediabetes    Glucose   Date Value Ref Range Status   10/27/2022 100 (H) 70 - 99 mg/dL Final   07/19/2022 87 70 - 99 mg/dL Final   12/31/2021 102 (H) 70 - 99 mg/dL Final   09/27/2021 107 (H) 70 - 99 mg/dL Final   09/21/2020 102 (H) 70 - 99 mg/dL Final     Comment:     Fasting specimen   09/09/2019 102 (H) 70 - 99 mg/dL Final     Comment:     Fasting specimen   04/12/2018 90 60 - 109 mg/dL Final   12/27/2017 111 (H) 70 - 99 mg/dL Final     Comment:     Fasting specimen   11/02/2017 102 (H) 70 - 99 mg/dL Final     Comment:     Fasting specimen     Lab Results   Component Value Date    A1C 5.4 11/07/2023    A1C 5.6 07/19/2022    A1C 5.2 03/17/2022    A1C 5.6 04/12/2018    A1C 5.5 07/18/2016    A1C 5.4 09/29/2014     SNHL - recent ENT evaluation - mild - observe    Glaucoma - followed by Optometry    Hx of endometrial polyps - Dr Moore - noticing similar symptoms    Social History     Tobacco Use    Smoking status: Never    Smokeless tobacco: Never   Substance Use Topics    Alcohol use: Yes     Alcohol/week: 0.0 - 1.0 standard drinks of alcohol             11/3/2023     1:06 PM   Alcohol Use   Prescreen: >3 drinks/day or >7 drinks/week? No     Reviewed orders with patient.  Reviewed health maintenance and updated orders accordingly - Yes    Breast Cancer Screening:        10/23/2022     9:58 PM 11/3/2023     1:12 PM   Breast CA Risk Assessment (FHS-7)   Do you have a family history of breast, colon, or ovarian cancer? Yes No / Unknown       Mammogram Screening: Recommended annual mammography  Pertinent mammograms are reviewed under the imaging tab.    History of abnormal Pap smear: NO - age 30-65 PAP every 5 years with negative HPV co-testing recommended - per Dr Oscar Andrews        Latest Ref Rng & Units 9/9/2019     8:55 AM 9/9/2019     8:37 AM  2016    12:00 AM   PAP / HPV   PAP (Historical)   NIL  NIL    HPV 16 DNA NEG^Negative Negative      HPV 18 DNA NEG^Negative Negative      Other HR HPV NEG^Negative Negative        Reviewed and updated as needed this visit by clinical staff   Tobacco  Allergies  Meds  Problems  Med Hx  Surg Hx  Fam Hx          Reviewed and updated as needed this visit by Provider                Health Maintenance     Colonoscopy:  ordered   FIT:  NA              Mammogram:  ordered              PAP:  per Dr Moore - [place]   DEXA:  ordered    Health Maintenance Due   Topic Date Due    DTAP/TDAP/TD IMMUNIZATION (2 - Td or Tdap) 2023       Current Problem List    Patient Active Problem List   Diagnosis    Fatty liver    Eczema    Glaucoma    Benign essential hypertension    Hyperlipidemia LDL goal <100    Prediabetes       Past Medical History    Past Medical History:   Diagnosis Date    Eczema 2016    Fatty liver 2013    Glaucoma     Dr NICOLA Lofton    Hyperlipidemia LDL goal <100     Hypertension goal BP (blood pressure) < 140/90     Mild or unspecified pre-eclampsia, unspecified as to episode of care 1999    toxemia pregnancy-twins    Prediabetes     Primary localized osteoarthrosis, ankle and foot 10/18/2011       Past Surgical History    Past Surgical History:   Procedure Laterality Date     SECTION       - twins    COLONOSCOPY N/A 2017    normal, due 5 yrs    DENTAL SURGERY      wisdom tooth    SURGICAL HISTORY OF -   2018    endometrial biopsy benign - Ob/Gyn - Dr Moore       Current Medications    Current Outpatient Medications   Medication Sig Dispense Refill    calcium carbonate (OS-RAINER) 500 MG tablet Take 1 tablet by mouth 2 times daily Patient is taking one tablet three times a week      fish oil-omega-3 fatty acids 1000 MG capsule Take 1 g by mouth every other day  90 capsule 3    fluocinonide (LIDEX) 0.05 % cream Apply small amount to affected area bid as  needed 30 g 2    irbesartan-hydrochlorothiazide (AVALIDE) 150-12.5 MG tablet TAKE 1/4 TABLET BY MOUTH DAILY 25 tablet 3    latanoprost (XALATAN) 0.005 % ophthalmic solution Place 1 drop into both eyes daily      MULTIVITAMIN TABS   OR       triamcinolone (KENALOG) 0.1 % external ointment Apply topically 2 times daily To rash on body/hands as needed. 80 g 1       Allergies    No Known Allergies    Immunizations    Immunization History   Administered Date(s) Administered    COVID-19 12+ (2023-24) (Pfizer) 11/07/2023    COVID-19 Bivalent 12+ (Pfizer) 11/29/2022    COVID-19 MONOVALENT 12+ (Pfizer) 03/19/2021, 04/09/2021, 12/06/2021    COVID-19 Monovalent 12+ (Pfizer 2022) 06/15/2022    Flu, Unspecified 09/21/2020    HEPA 06/16/2011    Influenza (H1N1) 01/18/2010    Influenza (IIV3) PF 10/05/2009, 12/03/2010, 10/18/2011, 09/27/2012, 10/18/2013, 10/19/2016    Influenza Vaccine 18-64 (Flublok) 10/20/2018, 10/19/2019, 09/27/2021, 10/27/2022, 11/07/2023    Influenza Vaccine >6 months (Alfuria,Fluzone) 09/26/2014, 09/21/2020    Influenza Vaccine, 6+MO IM (QUADRIVALENT W/PRESERVATIVES) 11/01/2015, 10/01/2017    Influenza,INJ,MDCK,PF,Quad >6mo(Flucelvax) 10/08/2017    TD,PF 7+ (Tenivac) 02/21/2006    TDAP Vaccine (Adacel) 03/29/2013    Zoster recombinant adjuvanted (SHINGRIX) 06/06/2018, 12/03/2018       Family History    Family History   Problem Relation Age of Onset    Hypertension Mother     Hyperlipidemia Mother     Atrial fibrillation Mother     Hypertension Father     C.A.D. Father 68    Prostate Cancer Father         Prostrate removed    Peripheral Vascular Disease Father     Hyperlipidemia Father     Skin Cancer Brother     Crohn's Disease Brother         had colectomy due to premalignant changes    Coronary Artery Disease Paternal Grandfather         Heart valve issues    Other - See Comments Son         Lambert Mando    Other - See Comments Son         Lambert Mando       Social History    Social History     Socioeconomic  History    Marital status:      Spouse name: Phu    Number of children: 2    Years of education: 16    Highest education level: Not on file   Occupational History     Employer: NONE    Tobacco Use    Smoking status: Never    Smokeless tobacco: Never   Vaping Use    Vaping Use: Never used   Substance and Sexual Activity    Alcohol use: Yes     Alcohol/week: 0.0 - 1.0 standard drinks of alcohol    Drug use: No    Sexual activity: Yes     Partners: Male     Birth control/protection: Post-menopausal   Other Topics Concern     Service Not Asked    Blood Transfusions Not Asked    Caffeine Concern Not Asked    Occupational Exposure Not Asked    Hobby Hazards Not Asked    Sleep Concern Not Asked    Stress Concern Not Asked    Weight Concern Not Asked    Special Diet Not Asked    Back Care Not Asked    Exercise No    Bike Helmet Not Asked    Seat Belt Yes    Self-Exams Yes     Comment: Occaisionally    Parent/sibling w/ CABG, MI or angioplasty before 65F 55M? No   Social History Narrative    Not on file     Social Determinants of Health     Financial Resource Strain: Low Risk  (11/3/2023)    Financial Resource Strain     Within the past 12 months, have you or your family members you live with been unable to get utilities (heat, electricity) when it was really needed?: No   Food Insecurity: Low Risk  (11/3/2023)    Food Insecurity     Within the past 12 months, did you worry that your food would run out before you got money to buy more?: No     Within the past 12 months, did the food you bought just not last and you didn t have money to get more?: No   Transportation Needs: Low Risk  (11/3/2023)    Transportation Needs     Within the past 12 months, has lack of transportation kept you from medical appointments, getting your medicines, non-medical meetings or appointments, work, or from getting things that you need?: No   Physical Activity: Not on file   Stress: Not on file   Social Connections: Not on file  "  Interpersonal Safety: Low Risk  (11/7/2023)    Interpersonal Safety     Do you feel physically and emotionally safe where you currently live?: Yes     Within the past 12 months, have you been hit, slapped, kicked or otherwise physically hurt by someone?: No     Within the past 12 months, have you been humiliated or emotionally abused in other ways by your partner or ex-partner?: No   Housing Stability: Low Risk  (11/3/2023)    Housing Stability     Do you have housing? : Yes     Are you worried about losing your housing?: No       ROS    CONSTITUTIONAL: NEGATIVE for fever, chills, change in weight  INTEGUMENTARY/SKIN: NEGATIVE for worrisome rashes, moles or lesions  EYES: NEGATIVE for vision changes or irritation  ENT/MOUTH: NEGATIVE for ear, mouth and throat problems  RESP: NEGATIVE for significant cough or SOB  BREAST: NEGATIVE for masses, tenderness or discharge  CV: NEGATIVE for chest pain, palpitations or peripheral edema  GI: NEGATIVE for nausea, abdominal pain, heartburn, or change in bowel habits  : NEGATIVE for frequency, dysuria, or hematuria  MUSCULOSKELETAL: NEGATIVE for significant arthralgias or myalgia  NEURO: NEGATIVE for weakness, dizziness or paresthesias  ENDOCRINE: NEGATIVE for temperature intolerance, skin/hair changes  HEME: NEGATIVE for bleeding problems  PSYCHIATRIC: NEGATIVE for changes in mood or affect    OBJECTIVE    /80   Pulse 86   Temp 98  F (36.7  C) (Tympanic)   Resp 16   Ht 1.727 m (5' 8\")   Wt 87.5 kg (193 lb)   LMP 10/29/2011 (Exact Date)   SpO2 97%   BMI 29.35 kg/m    EXAM:  GENERAL: healthy, alert and no distress  EYES: Eyes grossly normal to inspection, PERRL and conjunctivae and sclerae normal  HENT: ear canals and TM's normal, nose and mouth without ulcers or lesions  NECK: no adenopathy, no asymmetry, masses, or scars and thyroid normal to palpation  RESP: lungs clear to auscultation - no rales, rhonchi or wheezes  BREAST: per OB/Gyn  CV: regular rate and " rhythm, normal S1 S2, no S3 or S4, no murmur, click or rub, no peripheral edema and peripheral pulses strong  ABDOMEN: soft, nontender, no hepatosplenomegaly, no masses and bowel sounds normal   (female): per OB/Gyn  MS: no gross musculoskeletal defects noted, no edema  SKIN: no suspicious lesions or rashes  NEURO: Normal strength and tone, mentation intact and speech normal  PSYCH: mentation appears normal, affect normal/bright  LYMPH: no cervical, supraclavicular, axillary, or inguinal adenopathy    DIAGNOSTICS/PROCEDURES    Pending    ASSESSMENT      ICD-10-CM    1. Routine general medical examination at a health care facility  Z00.00 Comprehensive metabolic panel     Lipid panel reflex to direct LDL Fasting     CBC with platelets     CK total     UA Macroscopic with reflex to Microscopic and Culture     Albumin Random Urine Quantitative with Creat Ratio     TSH with free T4 reflex     Hemoglobin A1c     REVIEW OF HEALTH MAINTENANCE PROTOCOL ORDERS     PRIMARY CARE FOLLOW-UP SCHEDULING     Comprehensive metabolic panel     Lipid panel reflex to direct LDL Fasting     CBC with platelets     CK total     UA Macroscopic with reflex to Microscopic and Culture     Albumin Random Urine Quantitative with Creat Ratio     TSH with free T4 reflex     Hemoglobin A1c     UA Microscopic with Reflex to Culture     CANCELED: Fecal colorectal cancer screen FIT     CANCELED: Fecal colorectal cancer screen FIT      2. Prediabetes  R73.03 REVIEW OF HEALTH MAINTENANCE PROTOCOL ORDERS     PRIMARY CARE FOLLOW-UP SCHEDULING     irbesartan-hydrochlorothiazide (AVALIDE) 150-12.5 MG tablet     OFFICE/OUTPT VISIT,EST,LEVL IV      3. Benign essential hypertension  I10 Comprehensive metabolic panel     UA Macroscopic with reflex to Microscopic and Culture     Albumin Random Urine Quantitative with Creat Ratio     REVIEW OF HEALTH MAINTENANCE PROTOCOL ORDERS     PRIMARY CARE FOLLOW-UP SCHEDULING     Comprehensive metabolic panel     UA  Macroscopic with reflex to Microscopic and Culture     Albumin Random Urine Quantitative with Creat Ratio     irbesartan-hydrochlorothiazide (AVALIDE) 150-12.5 MG tablet     OFFICE/OUTPT VISIT,EST,LEVL IV     UA Microscopic with Reflex to Culture      4. Hyperlipidemia LDL goal <100  E78.5 REVIEW OF HEALTH MAINTENANCE PROTOCOL ORDERS     PRIMARY CARE FOLLOW-UP SCHEDULING     OFFICE/OUTPT VISIT,EST,LEVL IV      5. Fatty liver  K76.0 Comprehensive metabolic panel     REVIEW OF HEALTH MAINTENANCE PROTOCOL ORDERS     PRIMARY CARE FOLLOW-UP SCHEDULING     Comprehensive metabolic panel     OFFICE/OUTPT VISIT,EST,LEVL IV      6. Sensorineural hearing loss (SNHL) of both ears  H90.3 REVIEW OF HEALTH MAINTENANCE PROTOCOL ORDERS     PRIMARY CARE FOLLOW-UP SCHEDULING     OFFICE/OUTPT VISIT,EST,LEVL IV      7. Eczema, unspecified type  L30.9 REVIEW OF HEALTH MAINTENANCE PROTOCOL ORDERS     PRIMARY CARE FOLLOW-UP SCHEDULING     OFFICE/OUTPT VISIT,EST,LEVL IV      8. Glaucoma of both eyes, unspecified glaucoma type  H40.9 REVIEW OF HEALTH MAINTENANCE PROTOCOL ORDERS     PRIMARY CARE FOLLOW-UP SCHEDULING     OFFICE/OUTPT VISIT,EST,LEVL IV      9. Family history of Crohn's disease  Z83.79 REVIEW OF HEALTH MAINTENANCE PROTOCOL ORDERS     PRIMARY CARE FOLLOW-UP SCHEDULING     Colonoscopy Screening  Referral     OFFICE/OUTPT VISIT,EST,LEVL IV      10. Screen for colon cancer  Z12.11 REVIEW OF HEALTH MAINTENANCE PROTOCOL ORDERS     PRIMARY CARE FOLLOW-UP SCHEDULING     Colonoscopy Screening  Referral     OFFICE/OUTPT VISIT,EST,LEVL IV     CANCELED: Fecal colorectal cancer screen FIT     CANCELED: Fecal colorectal cancer screen FIT      11. Encounter for screening mammogram for malignant neoplasm of breast  Z12.31 REVIEW OF HEALTH MAINTENANCE PROTOCOL ORDERS     PRIMARY CARE FOLLOW-UP SCHEDULING     MA Screen Bilateral w/Seng     OFFICE/OUTPT VISIT,EST,LEVL IV      12. Screening for osteoporosis  Z13.820 REVIEW OF  HEALTH MAINTENANCE PROTOCOL ORDERS     PRIMARY CARE FOLLOW-UP SCHEDULING     DX Hip/Pelvis/Spine     OFFICE/OUTPT VISIT,EST,LEVL IV      13. Medication monitoring encounter  Z51.81 Comprehensive metabolic panel     Lipid panel reflex to direct LDL Fasting     CBC with platelets     CK total     UA Macroscopic with reflex to Microscopic and Culture     Albumin Random Urine Quantitative with Creat Ratio     TSH with free T4 reflex     Hemoglobin A1c     REVIEW OF HEALTH MAINTENANCE PROTOCOL ORDERS     PRIMARY CARE FOLLOW-UP SCHEDULING     Comprehensive metabolic panel     Lipid panel reflex to direct LDL Fasting     CBC with platelets     CK total     UA Macroscopic with reflex to Microscopic and Culture     Albumin Random Urine Quantitative with Creat Ratio     TSH with free T4 reflex     Hemoglobin A1c     OFFICE/OUTPT VISIT,EST,LEVL IV     UA Microscopic with Reflex to Culture      14. Need for influenza vaccination  Z23 REVIEW OF HEALTH MAINTENANCE PROTOCOL ORDERS     PRIMARY CARE FOLLOW-UP SCHEDULING     INFLUENZA VACCINE 18-64Y (FLUBLOK)     OFFICE/OUTPT VISIT,EST,LEVL IV      15. Need for COVID-19 vaccine  Z23 REVIEW OF HEALTH MAINTENANCE PROTOCOL ORDERS     COVID-19 12+ (2023-24) (PFIZER)     PRIMARY CARE FOLLOW-UP SCHEDULING     OFFICE/OUTPT VISIT,EST,LEVL IV          PLAN    Discussed treatment/modality options, including risk and benefits, she desires:    advised alcohol consumption 1oz per day or less, advised 1 multivitamin per day, advised calcium 8718-0903 mg/d and Vitamin D 800-1200 IU/d, advised diet, exercise, and weight loss, advised opthalmologist every 3-4 times per year, advised self breast exam q month, further health care maintenance, further lab(s), immunization(s), medication refill(s), and observation    All diagnosis above reviewed and noted above, otherwise stable.      See [facility] orders for further details.      1) med refills    2) labs pending    3) COVID / Flublok - Tdap soon -  "consider Twinrix/prevnar 20    4) colonoscopy / mammogram / DEXA    5) OB/Gyn    Return in about 1 year (around 11/7/2024) for Complete Physical, Medication Recheck Visit, Follow Up Chronic.    Health Maintenance Due   Topic Date Due    DTAP/TDAP/TD IMMUNIZATION (2 - Td or Tdap) 03/29/2023       COUNSELING    Reviewed preventive health counseling, as reflected in patient instructions    BP Readings from Last 1 Encounters:   11/07/23 130/80     Estimated body mass index is 29.35 kg/m  as calculated from the following:    Height as of this encounter: 1.727 m (5' 8\").    Weight as of this encounter: 87.5 kg (193 lb).     reports that she has never smoked. She has never used smokeless tobacco.           Paulo Arriola MD, FA[facility]  [address] [place] [id number]  [email or web address]   Office: [phone]  Fax: [phone]  Pager: [phone]       Answers submitted by the patient for this visit:  Annual Preventive Visit (Submitted on 11/3/2023)  Chief Complaint: Annual Exam:  abdominal pain: No  Blood in stool: No  Blood in urine: No  chest pain: No  chills: No  congestion: No  constipation: No  cough: No  diarrhea: No  dizziness: No  ear pain: No  eye pain: No  nervous/anxious: No  fever: No  frequency: No  genital sores: No  headaches: No  hearing loss: No  heartburn: No  arthralgias: No  joint swelling: No  peripheral edema: No  mood changes: No  myalgias: No  nausea: No  dysuria: No  palpitations: No  Skin sensation changes: No  sore throat: No  urgency: No  rash: No  shortness of breath: No  visual disturbance: No  weakness: No  pelvic pain: No  vaginal bleeding: Yes  vaginal discharge: Yes  tenderness: No  breast mass: No  breast discharge: No    "

## 2023-11-07 NOTE — TELEPHONE ENCOUNTER
"Endoscopy Scheduling Screen    Have you had a positive Covid test in the last 14 days?  No    Are you active on MyChart?   Yes    What insurance is in the chart?  Other:  [facility]    Ordering/Referring Provider:     KASSIE DAVIDSON      (If ordering provider performs procedure, schedule with ordering provider unless otherwise instructed. )    BMI: Estimated body mass index is 29.35 kg/m  as calculated from the following:    Height as of an earlier encounter on 11/7/23: 1.727 m (5' 8\").    Weight as of an earlier encounter on 11/7/23: 87.5 kg (193 lb).     Sedation Ordered  moderate sedation.   If patient BMI > 50 do not schedule in [facility].    If patient BMI > 45 do not schedule at [facility].    Are you taking methadone or Suboxone?  No    Are you taking any prescription medications for pain 3 or more times per week?   No    Do you have a history of malignant hyperthermia or adverse reaction to anesthesia?  No    (Females) Are you currently pregnant?        Have you been diagnosed or told you have pulmonary hypertension?   No    Do you have an LVAD?  No    Have you been told you have moderate to severe sleep apnea?  No    Have you been told you have COPD, asthma, or any other lung disease?  No    Do you have any heart conditions?  No     Have you ever had an organ transplant?   No    Have you ever had or are you awaiting a heart or lung transplant?   No    Have you had a stroke or transient ischemic attack (TIA aka \"mini stroke\" in the last 6 months?   No    Have you been diagnosed with or been told you have cirrhosis of the liver?   No    Are you currently on dialysis?   No    Do you need assistance transferring?   No    BMI: Estimated body mass index is 29.35 kg/m  as calculated from the following:    Height as of an earlier encounter on 11/7/23: 1.727 m (5' 8\").    Weight as of an earlier encounter on 11/7/23: 87.5 kg (193 lb).     Is patients BMI > 40 and scheduling location [facility]?  No    Do you take an injectable medication for " weight loss or diabetes (excluding insulin)?  No    Do you take the medication Naltrexone?  No    Do you take blood thinners?  No       Prep   Are you currently on dialysis or do you have chronic kidney disease?  No    Do you have a diagnosis of diabetes?  No    Do you have a diagnosis of cystic fibrosis (CF)?  No    On a regular basis do you go 3 -5 days between bowel movements?  No    BMI > 40?  No    Preferred Pharmacy:    [id number] - [place], [place] - [address] [place] [address] AT [facility] & [facility]  [address] [place] [id number]  Phone: [phone] Fax: [phone]      Final Scheduling Details   Colonoscopy prep sent?  Standard MiraLAX    Procedure scheduled  Colonoscopy    Surgeon:  NASIR     Date of procedure:  2/13     Pre-OP / PAC:   No - Not required for this site.    Location  [facility] - Per order.    Sedation   Moderate Sedation - Per order.      Patient Reminders:   You will receive a call from a Nurse to review instructions and health history.  This assessment must be completed prior to your procedure.  Failure to complete the Nurse assessment may result in the procedure being cancelled.      On the day of your procedure, please designate an adult(s) who can drive you home stay with you for the next 24 hours. The medicines used in the exam will make you sleepy. You will not be able to drive.      You cannot take public transportation, ride share services, or non-medical taxi service without a responsible caregiver.  Medical transport services are allowed with the requirement that a responsible caregiver will receive you at your destination.  We require that drivers and caregivers are confirmed prior to your procedure.

## 2023-11-12 DIAGNOSIS — R74.8 ELEVATED CK: Primary | ICD-10-CM

## 2023-11-29 ENCOUNTER — LAB (OUTPATIENT)
Dept: LAB | Facility: CLINIC | Age: 58
End: 2023-11-29
Payer: COMMERCIAL

## 2023-11-29 DIAGNOSIS — R74.8 ELEVATED CK: ICD-10-CM

## 2023-11-29 LAB — CK SERPL-CCNC: 180 U/L (ref 26–192)

## 2023-11-29 PROCEDURE — 82550 ASSAY OF CK (CPK): CPT

## 2023-11-29 PROCEDURE — 36415 COLL VENOUS BLD VENIPUNCTURE: CPT

## 2023-12-13 ENCOUNTER — LAB REQUISITION (OUTPATIENT)
Dept: LAB | Facility: CLINIC | Age: 58
End: 2023-12-13
Payer: COMMERCIAL

## 2023-12-13 DIAGNOSIS — R93.89 ABNORMAL FINDINGS ON DIAGNOSTIC IMAGING OF OTHER SPECIFIED BODY STRUCTURES: ICD-10-CM

## 2023-12-13 PROCEDURE — 88341 IMHCHEM/IMCYTCHM EA ADD ANTB: CPT | Mod: 26 | Performed by: PATHOLOGY

## 2023-12-13 PROCEDURE — 88305 TISSUE EXAM BY PATHOLOGIST: CPT | Mod: TC,ORL | Performed by: OBSTETRICS & GYNECOLOGY

## 2023-12-13 PROCEDURE — 88342 IMHCHEM/IMCYTCHM 1ST ANTB: CPT | Mod: 26 | Performed by: PATHOLOGY

## 2023-12-13 PROCEDURE — 88305 TISSUE EXAM BY PATHOLOGIST: CPT | Mod: 26 | Performed by: PATHOLOGY

## 2023-12-18 LAB
PATH REPORT.ADDENDUM SPEC: ABNORMAL
PATH REPORT.COMMENTS IMP SPEC: ABNORMAL
PATH REPORT.COMMENTS IMP SPEC: ABNORMAL
PATH REPORT.COMMENTS IMP SPEC: YES
PATH REPORT.FINAL DX SPEC: ABNORMAL
PATH REPORT.GROSS SPEC: ABNORMAL
PATH REPORT.MICROSCOPIC SPEC OTHER STN: ABNORMAL
PATH REPORT.RELEVANT HX SPEC: ABNORMAL
PATHOLOGY SYNOPTIC REPORT: ABNORMAL
PHOTO IMAGE: ABNORMAL

## 2023-12-19 ENCOUNTER — MYC MEDICAL ADVICE (OUTPATIENT)
Dept: FAMILY MEDICINE | Facility: CLINIC | Age: 58
End: 2023-12-19
Payer: COMMERCIAL

## 2023-12-20 NOTE — TELEPHONE ENCOUNTER
Please see my chart message below     Please review and advise     Thank you     Elisabet Parrish RN, BSN  [place] [facility]

## 2024-01-12 ENCOUNTER — TELEPHONE (OUTPATIENT)
Dept: GASTROENTEROLOGY | Facility: CLINIC | Age: 59
End: 2024-01-12
Payer: COMMERCIAL

## 2024-01-12 NOTE — TELEPHONE ENCOUNTER
Caller: patient   Reason for Reschedule/Cancellation (please be detailed, any staff messages or encounters to note?): patient illness-recent surgery      Prior to reschedule please review:  Ordering Provider: KASSIE DAVIDSON   Sedation per order: moderate  Does patient have any ASC Exclusions, please identify?: no      Notes on Cancelled Procedure:  Procedure: Lower Endoscopy [Colonoscopy]   Date: 2/13  Location[place] [facility]; [address] [place] [address]  Surgeon: tiago      Rescheduled: Yes  Procedure: Lower Endoscopy [Colonoscopy]   Date: 4/17  Location: [facility]; [address] [place] [address]  Surgeon: mahesh  Sedation Level Scheduled  moderate,  Reason for Sedation Level per order  Prep/Instructions updated and sent: nik

## 2024-02-12 ENCOUNTER — DOCUMENTATION ONLY (OUTPATIENT)
Dept: FAMILY MEDICINE | Facility: CLINIC | Age: 59
End: 2024-02-12
Payer: COMMERCIAL

## 2024-02-12 DIAGNOSIS — Z51.81 MEDICATION MONITORING ENCOUNTER: ICD-10-CM

## 2024-02-12 DIAGNOSIS — R74.8 ELEVATED CK: ICD-10-CM

## 2024-02-12 DIAGNOSIS — I10 BENIGN ESSENTIAL HYPERTENSION: ICD-10-CM

## 2024-02-12 DIAGNOSIS — E78.5 HYPERLIPIDEMIA LDL GOAL <100: Primary | ICD-10-CM

## 2024-02-12 DIAGNOSIS — K76.0 FATTY LIVER: ICD-10-CM

## 2024-02-12 DIAGNOSIS — R73.03 PREDIABETES: ICD-10-CM

## 2024-02-16 ENCOUNTER — TELEPHONE (OUTPATIENT)
Dept: GASTROENTEROLOGY | Facility: CLINIC | Age: 59
End: 2024-02-16

## 2024-02-16 NOTE — TELEPHONE ENCOUNTER
Caller: Joycelyn Laird     Reason for Reschedule/Cancellation   (please be detailed, any staff messages or encounters to note?):taking mother in law for another appointment      Prior to reschedule please review:  Ordering Provider: Paulo Arriola  Sedation Determined: moderate  Does patient have any ASC Exclusions, please identify?: n      Notes on Cancelled Procedure:  Procedure: Lower Endoscopy [Colonoscopy]   Date: 4/17  Location: [facility]; [address] [place] [address]  Surgeon: Ifrah      Rescheduled: Yes    Procedure: Lower Endoscopy [Colonoscopy]   Date: 5/21  Location: [facility]; [address] [place] [address]  Surgeon: Ifrah  Sedation Level Scheduled  moderate,  Reason for Sedation Level ordered  Prep/Instructions updated and sent: y       Did you cancel or rescheduled an [facility] procedure? No.

## 2024-03-14 ENCOUNTER — HOSPITAL ENCOUNTER (OUTPATIENT)
Dept: MAMMOGRAPHY | Facility: CLINIC | Age: 59
Discharge: HOME | End: 2024-03-14
Attending: FAMILY MEDICINE | Admitting: FAMILY MEDICINE
Payer: COMMERCIAL

## 2024-03-14 DIAGNOSIS — Z12.31 ENCOUNTER FOR SCREENING MAMMOGRAM FOR MALIGNANT NEOPLASM OF BREAST: ICD-10-CM

## 2024-03-14 PROCEDURE — 77063 BREAST TOMOSYNTHESIS BI: CPT

## 2024-03-27 ENCOUNTER — ANCILLARY PROCEDURE (OUTPATIENT)
Dept: BONE DENSITY | Facility: CLINIC | Age: 59
End: 2024-03-27
Attending: FAMILY MEDICINE
Payer: COMMERCIAL

## 2024-03-27 DIAGNOSIS — Z13.820 SCREENING FOR OSTEOPOROSIS: ICD-10-CM

## 2024-03-27 PROCEDURE — 77080 DXA BONE DENSITY AXIAL: CPT | Mod: TC | Performed by: RADIOLOGY

## 2024-04-07 ENCOUNTER — NURSE TRIAGE (OUTPATIENT)
Dept: FAMILY MEDICINE | Facility: CLINIC | Age: 59
End: 2024-04-07
Payer: COMMERCIAL

## 2024-04-07 NOTE — TELEPHONE ENCOUNTER
Reason for Call:  Appointment Request    Patient requesting this type of appt:   office visit     Requested provider Paulo Arriola or any provider in [facility]     Reason patient unable to be scheduled: Needs to be scheduled by clinic    When does patient want to be seen/preferred time: 3-7 days    Comments: please call to make an appt, infected nail bed, pt will like to be seen sometime this week. Wed will better for her     Could we [facility] this information to you [facility] or would you prefer to receive a phone call?:   Patient would prefer a phone call   Okay to leave a detailed message?: Yes at Cell number on file:    Telephone Information:   Mobile [phone]       Call taken on 4/7/2024 at 8:07 AM by Sae Voss MA

## 2024-04-08 ENCOUNTER — OFFICE VISIT (OUTPATIENT)
Dept: FAMILY MEDICINE | Facility: CLINIC | Age: 59
End: 2024-04-08
Payer: COMMERCIAL

## 2024-04-08 VITALS
SYSTOLIC BLOOD PRESSURE: 167 MMHG | BODY MASS INDEX: 30.11 KG/M2 | HEART RATE: 77 BPM | DIASTOLIC BLOOD PRESSURE: 91 MMHG | OXYGEN SATURATION: 98 % | RESPIRATION RATE: 18 BRPM | WEIGHT: 198 LBS | TEMPERATURE: 98.3 F

## 2024-04-08 DIAGNOSIS — L03.011 PARONYCHIA, FINGER, RIGHT: Primary | ICD-10-CM

## 2024-04-08 DIAGNOSIS — I10 PRIMARY HYPERTENSION: ICD-10-CM

## 2024-04-08 PROCEDURE — 99213 OFFICE O/P EST LOW 20 MIN: CPT | Mod: 25 | Performed by: PHYSICIAN ASSISTANT

## 2024-04-08 PROCEDURE — 10060 I&D ABSCESS SIMPLE/SINGLE: CPT | Performed by: PHYSICIAN ASSISTANT

## 2024-04-08 RX ORDER — IBUPROFEN 200 MG
600 TABLET ORAL
COMMUNITY
Start: 2024-01-04 | End: 2024-04-10

## 2024-04-08 RX ORDER — ACETAMINOPHEN 500 MG
1000 TABLET ORAL
COMMUNITY
Start: 2024-01-04 | End: 2024-04-10

## 2024-04-08 RX ORDER — CEPHALEXIN 500 MG/1
1000 CAPSULE ORAL 2 TIMES DAILY
Qty: 40 CAPSULE | Refills: 0 | Status: SHIPPED | OUTPATIENT
Start: 2024-04-08 | End: 2024-04-18

## 2024-04-08 RX ORDER — VITAMIN B COMPLEX
TABLET ORAL
COMMUNITY
Start: 2022-05-01

## 2024-04-08 RX ORDER — CALCIUM CARBONATE 500(1250)
1 TABLET,CHEWABLE ORAL
COMMUNITY

## 2024-04-08 NOTE — TELEPHONE ENCOUNTER
Called and spoke with patient.      The area is swelled and red, yellow/green. Felt irritated the last few weeks, worse over the last week.   Middle finger, right hand.   Left side of nail, middle.   It has spread to the bottom of nail.   Area is tender.     Advised to be seen today, offered to help find an appointment, but she is flying today. Advised to be seen in urgent care when she lands. Patient stated an understanding and agreed with plan.     NAVEED DEMARCO RN on 4/8/2024 at 10:20 AM   [facility]        Reason for Disposition   Looks infected (spreading redness, red streak, pus) and severe pain with movement    Protocols used: Finger Pain-A-OH

## 2024-04-09 NOTE — PROGRESS NOTES
Assessment & Plan     Paronychia, finger, right  We did try to drain with 18 gauge needle with tiny amount of discharge but mostly blood.  Will cover with keflex as ordered. Warm compresses.  RTC for persistent or worsening sx.   At the end of the encounter, I discussed results, diagnosis, medications. Discussed red flags for immediate return to clinic/ER, as well as indications for follow up if no improvement. Patient understood and agreed to plan. Patient was stable for discharge  PI given and discussed for paronychia.    - cephALEXin (KEFLEX) 500 MG capsule  Dispense: 40 capsule; Refill: 0    Primary hypertension  Pt BP elevated tonight but has been driving around to different urgent cares to get seen late tonight unfortunately, quite stressful for her.    I encouraged her to continue taking her BP medication, get a few home readings and will send to her pcp.          Angelique Ramirez PA-C  [facility]    Christopher Hirsch is a 58 year old female who presents to clinic today for the following health issues:  Chief Complaint   Patient presents with    Nail Problem     Possible infection in right middle finger       HPI    Pt presents with concerns re: R long finger redness swelling and mid discomfort.  Started last few weeks with minor redness and swelling along the medial nail fold, a greenish/yellowish discoloration.  It did drain a little at home. Then increased redness, swelling progressed proximally the last few days.  No fevers.  No trauma known.  No oral contact.      Review of Systems  Constitutional, HEENT, cardiovascular, pulmonary, gi and gu systems are negative, except as otherwise noted.      Objective    BP (!) 167/91   Pulse 77   Temp 98.3  F (36.8  C) (Oral)   Resp 18   Wt 89.8 kg (198 lb)   LMP 10/29/2011 (Exact Date)   SpO2 98%   BMI 30.11 kg/m    Physical Exam   Exam of the R long finger reveals erythema swelling and mild TTP proximal and medial nail  fold.  No fluctuant area.  Discussed option with pt to attempt drainage and she is agreeable, cleaned with ETOH and sterile 18 gauge used just to make a small slit incision at the nail fold medially and minimal discharge expressed with primarily blood. Cleaned and light dressing placed, bleeding well controlled.

## 2024-04-10 ENCOUNTER — OFFICE VISIT (OUTPATIENT)
Dept: FAMILY MEDICINE | Facility: CLINIC | Age: 59
End: 2024-04-10
Payer: COMMERCIAL

## 2024-04-10 VITALS
HEIGHT: 68 IN | OXYGEN SATURATION: 97 % | BODY MASS INDEX: 30.31 KG/M2 | SYSTOLIC BLOOD PRESSURE: 130 MMHG | WEIGHT: 200 LBS | HEART RATE: 73 BPM | RESPIRATION RATE: 11 BRPM | DIASTOLIC BLOOD PRESSURE: 76 MMHG | TEMPERATURE: 97.6 F

## 2024-04-10 DIAGNOSIS — L03.011 PARONYCHIA, FINGER, RIGHT: ICD-10-CM

## 2024-04-10 DIAGNOSIS — L60.0 INGROWN RIGHT BIG TOENAIL: ICD-10-CM

## 2024-04-10 DIAGNOSIS — R73.03 PREDIABETES: ICD-10-CM

## 2024-04-10 DIAGNOSIS — I10 HTN, GOAL BELOW 130/80: Primary | ICD-10-CM

## 2024-04-10 DIAGNOSIS — Z23 NEED FOR TDAP VACCINATION: ICD-10-CM

## 2024-04-10 PROCEDURE — 99214 OFFICE O/P EST MOD 30 MIN: CPT | Mod: 25 | Performed by: NURSE PRACTITIONER

## 2024-04-10 PROCEDURE — 90715 TDAP VACCINE 7 YRS/> IM: CPT | Performed by: NURSE PRACTITIONER

## 2024-04-10 PROCEDURE — 90471 IMMUNIZATION ADMIN: CPT | Performed by: NURSE PRACTITIONER

## 2024-04-10 RX ORDER — IRBESARTAN AND HYDROCHLOROTHIAZIDE 150; 12.5 MG/1; MG/1
TABLET, FILM COATED ORAL
Qty: 25 TABLET | Refills: 3 | Status: CANCELLED | OUTPATIENT
Start: 2024-04-10

## 2024-04-10 RX ORDER — IRBESARTAN AND HYDROCHLOROTHIAZIDE 150; 12.5 MG/1; MG/1
TABLET, FILM COATED ORAL
Qty: 45 TABLET | Refills: 3 | Status: SHIPPED | OUTPATIENT
Start: 2024-04-10 | End: 2024-05-23

## 2024-04-10 NOTE — PROGRESS NOTES
"  Assessment & Plan     HTN, goal below 130/80  Prediabetes  BP not under good control.   Take full tablet of medications instead of only 1/2 tablet.   Recheck BP in pharmacy and labs in 2 weeks.   - irbesartan-hydrochlorothiazide (AVALIDE) 150-12.5 MG tablet  Dispense: 45 tablet; Refill: 3    Paronychia, finger, right  Finish antx; see ortho prn.  - TDAP 10-64Y (ADACEL,BOOSTRIX)    Ingrown right big toenail  desires partial removal   - Orthopedic  Referral    Need for Tdap vaccination    - TDAP 10-64Y (ADACEL,BOOSTRIX)      BMI  Estimated body mass index is 30.41 kg/m  as calculated from the following:    Height as of this encounter: 1.727 m (5' 8\").    Weight as of this encounter: 90.7 kg (200 lb).       Return in 4 weeks (on 5/8/2024) for TDAP and repeat BP now; Nurse BP appt set up please when she is coming anyway for her labs please,.     Christopher Hirsch is a 58 year old, presenting for the following health issues:  Nail Problem        4/10/2024     9:45 AM   Additional Questions   Roomed by Cynthia RANGEL   Accompanied by Self     History of Present Illness       Reason for visit:  Infected nail bed    She eats 2-3 servings of fruits and vegetables daily.She consumes 1 sweetened beverage(s) daily.She exercises with enough effort to increase her heart rate 20 to 29 minutes per day.  She exercises with enough effort to increase her heart rate 3 or less days per week.   She is taking medications regularly.      Followup:    Facility:  [facility]  Date of visit: 04/08/2024  Reason for visit: Right middle - right finger infection  Current Status: Still taking Cephalexin and soaking 2-5x per day 15 min warm water  Was more red now more purple - swelling has move down the side a little more.  Not had a lot of pain unless bumped.  No drainage since drained in     No injuries or hang nails.     Triaged - 04/07/2024  Called and spoke with patient.      The area is swelled and red, " "yellow/green. Felt irritated the last few weeks, worse over the last week.   Middle finger, right hand.   Left side of nail, middle.   It has spread to the bottom of nail.   Area is tender.      Advised to be seen today, offered to help find an appointment, but she is flying today. Advised to be seen in urgent care when she lands. Patient stated an understanding and agreed with plan.      NAVEED DEMARCO RN on 4/8/2024 at 10:20 AM   [facility]          Reason for Disposition   Looks infected (spreading redness, red streak, pus) and severe pain with movement    Protocols used: Finger Pain-A-OH         BP Readings from Last 6 Encounters:   04/10/24 (!) 140/84   04/08/24 (!) 167/91   11/07/23 130/80   10/27/22 (!) 161/92   07/22/22 124/80   03/21/22 138/82     Constitutional, HEENT, cardiovascular, pulmonary, GI, , musculoskeletal, neuro, skin, endocrine and psych systems are negative, except as otherwise noted in the HPI.         Objective    BP (!) 140/84 (BP Location: Left arm, Patient Position: Chair, Cuff Size: Adult Large)   Pulse 73   Temp 97.6  F (36.4  C) (Tympanic)   Resp 11   Ht 1.727 m (5' 8\")   Wt 90.7 kg (200 lb)   LMP 10/29/2011 (Exact Date)   SpO2 97%   BMI 30.41 kg/m    Body mass index is 30.41 kg/m .  Physical Exam   GENERAL: alert and no distress  RESP: lungs clear to auscultation - no rales, rhonchi or wheezes  CV: regular rate and rhythm, normal S1 S2, no S3 or S4, no murmur, click or rub, no peripheral edema  MS: no gross musculoskeletal defects noted, no edema  SKIN: right distal middle finger edema mild erythema. right great toenail ingrown      Signed Electronically by: STEPHANIE Ruiz CNP    "

## 2024-04-29 ENCOUNTER — OFFICE VISIT (OUTPATIENT)
Dept: PODIATRY | Facility: CLINIC | Age: 59
End: 2024-04-29
Attending: NURSE PRACTITIONER
Payer: COMMERCIAL

## 2024-04-29 ENCOUNTER — NURSE TRIAGE (OUTPATIENT)
Dept: FAMILY MEDICINE | Facility: CLINIC | Age: 59
End: 2024-04-29

## 2024-04-29 VITALS — SYSTOLIC BLOOD PRESSURE: 135 MMHG | DIASTOLIC BLOOD PRESSURE: 83 MMHG | WEIGHT: 200 LBS | BODY MASS INDEX: 30.41 KG/M2

## 2024-04-29 DIAGNOSIS — L60.3 DYSTROPHIC NAIL: Primary | ICD-10-CM

## 2024-04-29 DIAGNOSIS — L60.0 INGROWN RIGHT BIG TOENAIL: ICD-10-CM

## 2024-04-29 PROCEDURE — 99203 OFFICE O/P NEW LOW 30 MIN: CPT | Performed by: PODIATRIST

## 2024-04-29 NOTE — TELEPHONE ENCOUNTER
"S-(situation): fingernail issue    B-(background): was recently on Cephalexin 500 mg for fingernail infection.      A-(assessment): skin around the fingernail is still pink and a little warmer to touch than other fingers.  Stated area of erythema never really went away.  Stated that the provider was pushing a needle around under the nail to get a sample and patient thought it was still irritated due to that.  Denied any drainage, fever, swelling.    R-(recommendations): see in office today or tomorrow.    Assisted with scheduling appointment.    Next 5 appointments (look out 90 days)      Apr 30, 2024  7:30 AM  (Arrive by 7:10 AM)  Provider Visit with Laurie Soriano CNP  [facility] ([facility] [place] ) [address] [place] [id number] [phone]              Reason for Disposition   Finished taking antibiotics and symptoms are BETTER but are not completely gone    Additional Information   Negative: SEVERE difficulty breathing (e.g., struggling for each breath, speaks in single words)   Negative: Sounds like a life-threatening emergency to the triager   Negative: Sinus infection and taking an antibiotic   Negative: Wound infection and taking an antibiotic   Negative: MODERATE difficulty breathing (e.g., speaks in phrases, SOB even at rest, pulse 100-120)   Negative: Fever > 103 F  (39.4 C)   Negative: Patient sounds very sick or weak to the triager    Answer Assessment - Initial Assessment Questions  1. INFECTION: \"What infection is the antibiotic being given for?\"      cephalexin  2. ANTIBIOTIC: \"What antibiotic are you taking\" \"How many times per day?\"      Has completed the antibiotics  3. DURATION: \"When was the antibiotic started?\"      4/8/24.  4. MAIN CONCERN OR SYMPTOM:  \"What is your main concern right now?\"      Area of pink on right middle finger.  Left side of nail about half way down is pink and slightly warm to touch  5. " "BETTER-SAME-WORSE: \"Are you getting better, staying the same, or getting worse compared to when you first started the antibiotics?\" If getting worse, ask: \"In what way?\"       Improved that there is no drainage, but is still pink with increased warmth  6. FEVER: \"Do you have a fever?\" If Yes, ask: \"What is your temperature, how was it measured, and when did it start?\"      no  7. SYMPTOMS: \"Are there any other symptoms you're concerned about?\" If Yes, ask: \"When did it start?\"      Pink area following completing antibiotics  8. FOLLOW-UP APPOINTMENT: \"Do you have a follow-up appointment with your doctor?\"      no    Protocols used: Infection on Antibiotic Follow-up Call-A-[place]    "

## 2024-04-29 NOTE — PROGRESS NOTES
Foot & Ankle Surgery  2024    CC: Nail issues    I was asked to see Joycelyn Laird regarding the chief complaint by:  TIAGO BROWN CNP    HPI:  Pt is a 58 year old female who presents with above complaint.  The patient states she injured her right great toe last year and has been having some issues along the medial edge.  She was placed on an oral antibiotic for fingernail issue and states that the toenail has been feeling much better.  I saw her a number of years ago for left second nail issue.  She states she has been treating this with a topical antifungal for multiple years without improvement in the nail.  The nail is not specifically painful.    ROS:   Pos for CC.  The patient denies current nausea, vomiting, chills, fevers, belly pain, calf pain, chest pain or SOB.  Complete remainder of ROS is otherwise neg.    VITALS:    Vitals:    24 0931   BP: 135/83   Weight: 90.7 kg (200 lb)       PMH:    Past Medical History:   Diagnosis Date    Eczema 2016    Endometrial cancer (H) 2023    Endometrial endometroid carcinoma, stage 2    Fatty liver 2013    Glaucoma     Dr NICOLA Lofton    Hyperlipidemia LDL goal <100     Hypertension goal BP (blood pressure) < 140/90     Mild or unspecified pre-eclampsia, unspecified as to episode of care 1999    toxemia pregnancy-twins    Prediabetes     Primary localized osteoarthrosis, ankle and foot 10/18/2011       SXHX:    Past Surgical History:   Procedure Laterality Date     SECTION       - twins    COLONOSCOPY N/A 2017    normal, due 5 yrs    DENTAL SURGERY      wisdom tooth    SURGICAL HISTORY OF -   2018    endometrial biopsy benign - Ob/Gyn - Dr Oscar EDWARDS:    Current Outpatient Medications   Medication Sig Dispense Refill    calcium carbonate 500 mg, elemental, 1250 (500 Ca) MG tablet chewable Take 1 tablet by mouth      fish oil-omega-3 fatty acids 1000 MG capsule Take 1 g by mouth every other day  90  capsule 3    irbesartan-hydrochlorothiazide (AVALIDE) 150-12.5 MG tablet TAKE 1/2 TABLET BY MOUTH DAILY 45 tablet 3    latanoprost (XALATAN) 0.005 % ophthalmic solution Place 1 drop into both eyes daily      MULTIVITAMIN TABS   OR       triamcinolone (KENALOG) 0.1 % external ointment Apply topically 2 times daily To rash on body/hands as needed. 80 g 1    Vitamin D3 (CHOLECALCIFEROL) 25 mcg (1000 units) tablet        No current facility-administered medications for this visit.       ALL:   No Known Allergies    FMH:    Family History   Problem Relation Age of Onset    Hypertension Mother     Hyperlipidemia Mother     Atrial fibrillation Mother     Hypertension Father     C.A.D. Father 68    Prostate Cancer Father         Prostrate removed    Peripheral Vascular Disease Father     Hyperlipidemia Father     Skin Cancer Brother     Crohn's Disease Brother         had colectomy due to premalignant changes    Coronary Artery Disease Paternal Grandfather         Heart valve issues    Other - See Comments Yonny Gilmore    Other - See Comments Yonny Gilmore       SocHx:    Social History     Socioeconomic History    Marital status:      Spouse name: Phu    Number of children: 2    Years of education: 16    Highest education level: Not on file   Occupational History     Employer: NONE    Tobacco Use    Smoking status: Never    Smokeless tobacco: Never   Vaping Use    Vaping status: Never Used   Substance and Sexual Activity    Alcohol use: Yes     Alcohol/week: 0.0 - 1.0 standard drinks of alcohol    Drug use: No    Sexual activity: Yes     Partners: Male     Birth control/protection: Post-menopausal   Other Topics Concern     Service Not Asked    Blood Transfusions Not Asked    Caffeine Concern Not Asked    Occupational Exposure Not Asked    Hobby Hazards Not Asked    Sleep Concern Not Asked    Stress Concern Not Asked    Weight Concern Not Asked    Special Diet Not Asked    Back Care Not  Asked    Exercise No    Bike Helmet Not Asked    Seat Belt Yes    Self-Exams Yes     Comment: Occaisionally    Parent/sibling w/ CABG, MI or angioplasty before 65F 55M? No   Social History Narrative    Not on file     Social Determinants of Health     Financial Resource Strain: Low Risk  (11/3/2023)    Financial Resource Strain     Within the past 12 months, have you or your family members you live with been unable to get utilities (heat, electricity) when it was really needed?: No   Food Insecurity: No Food Insecurity (12/21/2023)    Received from [facility]    Hunger Vital Sign     Worried About Running Out of Food in the Last Year: Never true     Ran Out of Food in the Last Year: Never true   Transportation Needs: No Transportation Needs (12/21/2023)    Received from [facility]    PRAPARE - Transportation     Lack of Transportation (Medical): No     Lack of Transportation (Non-Medical): No   Physical Activity: Insufficiently Active (12/21/2023)    Received from [facility]    Exercise Vital Sign     Days of Exercise per Week: 3 days     Minutes of Exercise per Session: 30 min   Stress: Not on file   Social Connections: Not on file   Interpersonal Safety: Low Risk  (11/7/2023)    Interpersonal Safety     Do you feel physically and emotionally safe where you currently live?: Yes     Within the past 12 months, have you been hit, slapped, kicked or otherwise physically hurt by someone?: No     Within the past 12 months, have you been humiliated or emotionally abused in other ways by your partner or ex-partner?: No   Housing Stability: Low Risk  (12/21/2023)    Received from [facility]    Housing Stability     What is your living situation today?: I have a steady place to live           EXAMINATION:  Gen:   No apparent distress  Neuro:   A&Ox3, no deficits  Psych:    Answering questions appropriately for age and situation with normal affect  Head:    NCAT  Eye:     Visual scanning without deficit  Ear:    Response to auditory stimuli wnl  Lung:    Non-labored breathing on RA noted  Abd:    NTND per patient report  Lymph:    Neg for pitting/non-pitting edema BLE  Vasc:    Pulses palpable, CFT minimally delayed  Neuro:    Light touch sensation intact to all sensory nerve distributions without paresthesias  Derm:    Mild onychocryptosis medial right hallux.  There is no inflammation or signs of infection and there is no pain on examination.  The left second nail is dystrophic without clear onychomycosis  MSK:    ROM, strength wnl without limitation, no pain on palpation noted.  Calf:    Neg for redness, swelling or tenderness    Assessment:  58 year old female with mild onychocryptosis medial right hallux with previous paronychia, now resolved; dystrophic left second nail      Medical Decision Making/Plan:  Discussed etiologies, anatomy and options  1.  Mild onychocryptosis medial right hallux with previous paronychia, now resolved  -I personally reviewed and interpreted the patient's lower extremity history pertinent to today's visit, including imaging/labs, in preparation for initiating a treatment program.  -The toe was clinically stable without signs of infection to warrant soaks/ibuprofen or further antibiotics  -We discussed slant back, partial temporary and partial permanent avulsion options to consider.  However, as the toe is doing quite well, I recommend she simply monitor for now.  She is very happy with this plan    2.  Dystrophic left second nail  -This does not appear to be onychomycosis.  I offered a culture to determine for certain but she declined  -We discussed fungal versus dystrophic changes secondary to germinal matrix injury.  This appears to be more consistent with the latter.  I think simply filing the nail flap would be appropriate.  She asked if nail polish is okay, I certainly think it is reasonable.  We discussed the option for nail removal if this  becomes problematic.  Otherwise, as the nail is otherwise clinically stable, she will simply monitor for now      Follow up:  prn or sooner with acute issues      Patient's medical history was reviewed today      Talha Harmon DPM FACFAS FACFAOM  Podiatric Foot & Ankle Surgeon  [facility]  [phone]    Disclaimer: This note consists of symbols derived from keyboarding, dictation and/or voice recognition software. As a result, there may be errors in the script that have gone undetected. Please consider this when interpreting information found in this chart.

## 2024-04-29 NOTE — LETTER
2024         RE: Joycelyn Laird  [address] [facility] [address]        Dear Colleague,    Thank you for referring your patient, Joycelyn Laird, to the [facility]. Please see a copy of my visit note below.    Foot & Ankle Surgery  2024    CC: Nail issues    I was asked to see Joycelyn Laird regarding the chief complaint by:  TAIGO BROWN CNP    HPI:  Pt is a 58 year old female who presents with above complaint.  The patient states she injured her right great toe last year and has been having some issues along the medial edge.  She was placed on an oral antibiotic for fingernail issue and states that the toenail has been feeling much better.  I saw her a number of years ago for left second nail issue.  She states she has been treating this with a topical antifungal for multiple years without improvement in the nail.  The nail is not specifically painful.    ROS:   Pos for CC.  The patient denies current nausea, vomiting, chills, fevers, belly pain, calf pain, chest pain or SOB.  Complete remainder of ROS is otherwise neg.    VITALS:    Vitals:    24 0931   BP: 135/83   Weight: 90.7 kg (200 lb)       PMH:    Past Medical History:   Diagnosis Date     Eczema 2016     Endometrial cancer (H) 2023    Endometrial endometroid carcinoma, stage 2     Fatty liver 2013     Glaucoma     Dr NICOLA Lofton     Hyperlipidemia LDL goal <100      Hypertension goal BP (blood pressure) < 140/90      Mild or unspecified pre-eclampsia, unspecified as to episode of care 1999    toxemia pregnancy-twins     Prediabetes      Primary localized osteoarthrosis, ankle and foot 10/18/2011       SXHX:    Past Surgical History:   Procedure Laterality Date      SECTION       - twins     COLONOSCOPY N/A 2017    normal, due 5 yrs     DENTAL SURGERY      wisdom tooth     SURGICAL HISTORY OF -   2018    endometrial biopsy benign - Ob/Gyn -   Oscar        MEDS:    Current Outpatient Medications   Medication Sig Dispense Refill     calcium carbonate 500 mg, elemental, 1250 (500 Ca) MG tablet chewable Take 1 tablet by mouth       fish oil-omega-3 fatty acids 1000 MG capsule Take 1 g by mouth every other day  90 capsule 3     irbesartan-hydrochlorothiazide (AVALIDE) 150-12.5 MG tablet TAKE 1/2 TABLET BY MOUTH DAILY 45 tablet 3     latanoprost (XALATAN) 0.005 % ophthalmic solution Place 1 drop into both eyes daily       MULTIVITAMIN TABS   OR        triamcinolone (KENALOG) 0.1 % external ointment Apply topically 2 times daily To rash on body/hands as needed. 80 g 1     Vitamin D3 (CHOLECALCIFEROL) 25 mcg (1000 units) tablet        No current facility-administered medications for this visit.       ALL:   No Known Allergies    FMH:    Family History   Problem Relation Age of Onset     Hypertension Mother      Hyperlipidemia Mother      Atrial fibrillation Mother      Hypertension Father      C.A.D. Father 68     Prostate Cancer Father         Prostrate removed     Peripheral Vascular Disease Father      Hyperlipidemia Father      Skin Cancer Brother      Crohn's Disease Brother         had colectomy due to premalignant changes     Coronary Artery Disease Paternal Grandfather         Heart valve issues     Other - See Comments Yonny Gilmore     Other - See Comments Yonny Gilmore       SocHx:    Social History     Socioeconomic History     Marital status:      Spouse name: Phu     Number of children: 2     Years of education: 16     Highest education level: Not on file   Occupational History     Employer: NONE    Tobacco Use     Smoking status: Never     Smokeless tobacco: Never   Vaping Use     Vaping status: Never Used   Substance and Sexual Activity     Alcohol use: Yes     Alcohol/week: 0.0 - 1.0 standard drinks of alcohol     Drug use: No     Sexual activity: Yes     Partners: Male     Birth control/protection: Post-menopausal    Other Topics Concern      Service Not Asked     Blood Transfusions Not Asked     Caffeine Concern Not Asked     Occupational Exposure Not Asked     Hobby Hazards Not Asked     Sleep Concern Not Asked     Stress Concern Not Asked     Weight Concern Not Asked     Special Diet Not Asked     Back Care Not Asked     Exercise No     Bike Helmet Not Asked     Seat Belt Yes     Self-Exams Yes     Comment: Occaisionally     Parent/sibling w/ CABG, MI or angioplasty before 65F 55M? No   Social History Narrative     Not on file     Social Determinants of Health     Financial Resource Strain: Low Risk  (11/3/2023)    Financial Resource Strain      Within the past 12 months, have you or your family members you live with been unable to get utilities (heat, electricity) when it was really needed?: No   Food Insecurity: No Food Insecurity (12/21/2023)    Received from [facility]    Hunger Vital Sign      Worried About Running Out of Food in the Last Year: Never true      Ran Out of Food in the Last Year: Never true   Transportation Needs: No Transportation Needs (12/21/2023)    Received from [facility]    PRAPARE - Transportation      Lack of Transportation (Medical): No      Lack of Transportation (Non-Medical): No   Physical Activity: Insufficiently Active (12/21/2023)    Received from [facility]    Exercise Vital Sign      Days of Exercise per Week: 3 days      Minutes of Exercise per Session: 30 min   Stress: Not on file   Social Connections: Not on file   Interpersonal Safety: Low Risk  (11/7/2023)    Interpersonal Safety      Do you feel physically and emotionally safe where you currently live?: Yes      Within the past 12 months, have you been hit, slapped, kicked or otherwise physically hurt by someone?: No      Within the past 12 months, have you been humiliated or emotionally abused in other ways by your partner or ex-partner?: No   Housing Stability: Low Risk   (12/21/2023)    [facility] from [facility], [facility]    Housing Stability      What is your living situation today?: I have a steady place to live           EXAMINATION:  Gen:   No apparent distress  Neuro:   A&Ox3, no deficits  Psych:    Answering questions appropriately for age and situation with normal affect  Head:    NCAT  Eye:    Visual scanning without deficit  Ear:    Response to auditory stimuli wnl  Lung:    Non-labored breathing on RA noted  Abd:    NTND per patient report  Lymph:    Neg for pitting/non-pitting edema BLE  Vasc:    Pulses palpable, CFT minimally delayed  Neuro:    Light touch sensation intact to all sensory nerve distributions without paresthesias  Derm:    Mild onychocryptosis medial right hallux.  There is no inflammation or signs of infection and there is no pain on examination.  The left second nail is dystrophic without clear onychomycosis  MSK:    ROM, strength wnl without limitation, no pain on palpation noted.  Calf:    Neg for redness, swelling or tenderness    Assessment:  58 year old female with mild onychocryptosis medial right hallux with previous paronychia, now resolved; dystrophic left second nail      Medical Decision Making/Plan:  Discussed etiologies, anatomy and options  1.  Mild onychocryptosis medial right hallux with previous paronychia, now resolved  -I personally reviewed and interpreted the patient's lower extremity history pertinent to today's visit, including imaging/labs, in preparation for initiating a treatment program.  -The toe was clinically stable without signs of infection to warrant soaks/ibuprofen or further antibiotics  -We discussed slant back, partial temporary and partial permanent avulsion options to consider.  However, as the toe is doing quite well, I recommend she simply monitor for now.  She is very happy with this plan    2.  Dystrophic left second nail  -This does not appear to be onychomycosis.  I offered a culture to determine for  certain but she declined  -We discussed fungal versus dystrophic changes secondary to germinal matrix injury.  This appears to be more consistent with the latter.  I think simply filing the nail flap would be appropriate.  She asked if nail polish is okay, I certainly think it is reasonable.  We discussed the option for nail removal if this becomes problematic.  Otherwise, as the nail is otherwise clinically stable, she will simply monitor for now      Follow up:  prn or sooner with acute issues      Patient's medical history was reviewed today      Talha Harmon DPM [facility] FACFAOM  Podiatric Foot & Ankle Surgeon  [facility]  [phone]    Disclaimer: This note consists of symbols derived from keyboarding, dictation and/or voice recognition software. As a result, there may be errors in the script that have gone undetected. Please consider this when interpreting information found in this chart.          Again, thank you for allowing me to participate in the care of your patient.        Sincerely,        Talha Harmon DPM, DAVID

## 2024-04-30 ENCOUNTER — OFFICE VISIT (OUTPATIENT)
Dept: FAMILY MEDICINE | Facility: CLINIC | Age: 59
End: 2024-04-30
Payer: COMMERCIAL

## 2024-04-30 VITALS
TEMPERATURE: 97.2 F | BODY MASS INDEX: 31.02 KG/M2 | OXYGEN SATURATION: 97 % | WEIGHT: 204 LBS | HEART RATE: 85 BPM | SYSTOLIC BLOOD PRESSURE: 128 MMHG | DIASTOLIC BLOOD PRESSURE: 82 MMHG

## 2024-04-30 DIAGNOSIS — L03.011 PARONYCHIA, FINGER, RIGHT: ICD-10-CM

## 2024-04-30 DIAGNOSIS — L08.9 FINGER INFECTION: Primary | ICD-10-CM

## 2024-04-30 PROCEDURE — 99214 OFFICE O/P EST MOD 30 MIN: CPT | Performed by: NURSE PRACTITIONER

## 2024-04-30 RX ORDER — SULFAMETHOXAZOLE/TRIMETHOPRIM 800-160 MG
1 TABLET ORAL 2 TIMES DAILY
Qty: 20 TABLET | Refills: 0 | Status: SHIPPED | OUTPATIENT
Start: 2024-04-30 | End: 2024-05-10

## 2024-04-30 NOTE — PROGRESS NOTES
"  Assessment & Plan     Finger infection  Right middle erythema surrounding nail bed and swollen. Not fully gone after cephalexin. Treat as below.   - sulfamethoxazole-trimethoprim (BACTRIM DS) 800-160 MG tablet  Dispense: 20 tablet; Refill: 0    Paronychia, finger, right        Prescription drug management        BMI  Estimated body mass index is 31.02 kg/m  as calculated from the following:    Height as of 4/10/24: 1.727 m (5' 8\").    Weight as of this encounter: 92.5 kg (204 lb).             Christopher Hirsch is a 58 year old, presenting for the following health issues:  Infection (Finger, F/U)        4/30/2024     7:23 AM   Additional Questions   Roomed by Andre GUAN CMA     History of Present Illness       Reason for visit:  Follow up on infection in skin surrounding fingernail    She eats 2-3 servings of fruits and vegetables daily.She consumes 1 sweetened beverage(s) daily.She exercises with enough effort to increase her heart rate 10 to 19 minutes per day.  She exercises with enough effort to increase her heart rate 3 or less days per week.   She is taking medications regularly.     F/U Finger Infection - Right middle, still red. Patient completed Cephalexin antibiotics.   Abx done on 4/18 or so. Improved but not gone. Waxing and waning level of swelling and erythema.        Constitutional, HEENT, cardiovascular, pulmonary, GI, , musculoskeletal, neuro, skin, endocrine and psych systems are negative, except as otherwise noted.        Objective    /82   Pulse 85   Temp 97.2  F (36.2  C) (Tympanic)   Wt 92.5 kg (204 lb)   LMP 10/29/2011 (Exact Date)   SpO2 97%   BMI 31.02 kg/m    Body mass index is 31.02 kg/m .  Physical Exam   GENERAL: alert and no distress  SKIN: right middle digit nailbed erythematous and swollen, slightly tender.          Signed Electronically by: Laurie Soriano CNP    "

## 2024-05-08 ENCOUNTER — TELEPHONE (OUTPATIENT)
Dept: GASTROENTEROLOGY | Facility: CLINIC | Age: 59
End: 2024-05-08
Payer: COMMERCIAL

## 2024-05-08 NOTE — TELEPHONE ENCOUNTER
Pre visit planning completed.      Procedure details:    Patient scheduled for Colonoscopy  on 05.21.2024.     Arrival time: 0730. Procedure time 0815    Facility location: [facility]; [address] [place] [address]. Check in location: Main entrance at . Come through the roundabout underneath the awning (not the ER entrance).    Sedation type: Conscious sedation     Pre op exam needed? N/A    Indication for procedure:  Screen for colon cancer   Family history of Crohn's disease       Chart review:     Electronic implanted devices? No    Recent diagnosis of diverticulitis within the last 6 weeks? No    Diabetic? No      Medication review:    Anticoagulants? No    NSAIDS? No NSAID medications per patient's medication list.  RN will verify with pre-assessment call.    Other medication HOLDING recommendations:  N/A      Prep for procedure:     Bowel prep recommendation: Standard Miralax  Due to: standard bowel prep.    Prep instructions sent via [employer]         Oliva Noble RN  Endoscopy Procedure Pre Assessment RN  [phone] option 4

## 2024-05-09 NOTE — TELEPHONE ENCOUNTER
Pre assessment completed for upcoming procedure.   (Please see previous telephone encounter notes for complete details)    Procedure details:    Arrival time and facility location reviewed.    Pre op exam needed? N/A    Designated  policy reviewed. Instructed to have someone stay 6  hours post procedure.       Medication review:    Medications reviewed. Please see supporting documentation below. Holding recommendations discussed (if applicable).       Prep for procedure:     Procedure prep instructions reviewed.        Any additional information needed:  N/A      Patient  verbalized understanding and had no questions or concerns at this time.      Oliva Gomez RN  Endoscopy Procedure Pre Assessment RN  [phone] option 4

## 2024-05-13 NOTE — TELEPHONE ENCOUNTER
Incoming call from patient.    The patient wanting to confirm times of prep and procedure.    Writer answered all patient questions to her satisfaction, the patient has no further questions.    Corinne Kliber, RN  Endoscopy Procedure Pre Assessment RN  [phone] option 4

## 2024-05-14 ENCOUNTER — LAB (OUTPATIENT)
Dept: LAB | Facility: CLINIC | Age: 59
End: 2024-05-14
Payer: COMMERCIAL

## 2024-05-14 DIAGNOSIS — R74.8 ELEVATED CK: ICD-10-CM

## 2024-05-14 DIAGNOSIS — Z51.81 MEDICATION MONITORING ENCOUNTER: ICD-10-CM

## 2024-05-14 DIAGNOSIS — I10 BENIGN ESSENTIAL HYPERTENSION: ICD-10-CM

## 2024-05-14 DIAGNOSIS — E78.5 HYPERLIPIDEMIA LDL GOAL <100: ICD-10-CM

## 2024-05-14 DIAGNOSIS — R73.03 PREDIABETES: ICD-10-CM

## 2024-05-14 DIAGNOSIS — K76.0 FATTY LIVER: ICD-10-CM

## 2024-05-14 LAB
ALBUMIN SERPL BCG-MCNC: 4.2 G/DL (ref 3.5–5.2)
ALP SERPL-CCNC: 51 U/L (ref 40–150)
ALT SERPL W P-5'-P-CCNC: 21 U/L (ref 0–50)
ANION GAP SERPL CALCULATED.3IONS-SCNC: 11 MMOL/L (ref 7–15)
AST SERPL W P-5'-P-CCNC: 29 U/L (ref 0–45)
BILIRUB SERPL-MCNC: 0.3 MG/DL
BUN SERPL-MCNC: 11.6 MG/DL (ref 6–20)
CALCIUM SERPL-MCNC: 9.1 MG/DL (ref 8.6–10)
CHLORIDE SERPL-SCNC: 107 MMOL/L (ref 98–107)
CHOLEST SERPL-MCNC: 167 MG/DL
CK SERPL-CCNC: 136 U/L (ref 26–192)
CREAT SERPL-MCNC: 0.72 MG/DL (ref 0.51–0.95)
DEPRECATED HCO3 PLAS-SCNC: 24 MMOL/L (ref 22–29)
EGFRCR SERPLBLD CKD-EPI 2021: >90 ML/MIN/1.73M2
FASTING STATUS PATIENT QL REPORTED: YES
FASTING STATUS PATIENT QL REPORTED: YES
GLUCOSE SERPL-MCNC: 105 MG/DL (ref 70–99)
HBA1C MFR BLD: 5.5 % (ref 0–5.6)
HDLC SERPL-MCNC: 42 MG/DL
LDLC SERPL CALC-MCNC: 102 MG/DL
NONHDLC SERPL-MCNC: 125 MG/DL
POTASSIUM SERPL-SCNC: 4.3 MMOL/L (ref 3.4–5.3)
PROT SERPL-MCNC: 7 G/DL (ref 6.4–8.3)
SODIUM SERPL-SCNC: 142 MMOL/L (ref 135–145)
TRIGL SERPL-MCNC: 116 MG/DL

## 2024-05-14 PROCEDURE — 83036 HEMOGLOBIN GLYCOSYLATED A1C: CPT

## 2024-05-14 PROCEDURE — 36415 COLL VENOUS BLD VENIPUNCTURE: CPT

## 2024-05-14 PROCEDURE — 82550 ASSAY OF CK (CPK): CPT

## 2024-05-14 PROCEDURE — 80061 LIPID PANEL: CPT

## 2024-05-14 PROCEDURE — 80053 COMPREHEN METABOLIC PANEL: CPT

## 2024-05-17 ENCOUNTER — ALLIED HEALTH/NURSE VISIT (OUTPATIENT)
Dept: FAMILY MEDICINE | Facility: CLINIC | Age: 59
End: 2024-05-17
Payer: COMMERCIAL

## 2024-05-17 VITALS — DIASTOLIC BLOOD PRESSURE: 91 MMHG | SYSTOLIC BLOOD PRESSURE: 151 MMHG

## 2024-05-17 DIAGNOSIS — I10 BENIGN ESSENTIAL HYPERTENSION: Primary | ICD-10-CM

## 2024-05-17 PROCEDURE — 99207 PR NO CHARGE NURSE ONLY: CPT | Performed by: FAMILY MEDICINE

## 2024-05-17 NOTE — PROGRESS NOTES
Joycelyn Laird was evaluated at [facility] on May 17, 2024 at which time her blood pressure was:    BP Readings from Last 1 Encounters:   05/17/24 (!) 151/91     No data recorded      Reviewed lifestyle modifications for blood pressure control and reduction: including making healthy food choices, managing weight, getting regular exercise, smoking cessation, reducing alcohol consumption, monitoring blood pressure regularly.     Symptoms: None    BP Goal:< 140/90 mmHg    BP Assessment:  BP at goal    Potential Reasons for BP too high: NA - Not applicable    BP Follow-Up Plan: Recheck BP in 30 days at pharmacy    Recommendation to Provider: Rechecking in 2 weeks.  Pt had just taken medications about 1 hour before check. Scheduled for 5/31    Note completed by: Laurie Martins RPH

## 2024-05-21 ENCOUNTER — HOSPITAL ENCOUNTER (OUTPATIENT)
Facility: CLINIC | Age: 59
Discharge: HOME | End: 2024-05-21
Attending: INTERNAL MEDICINE | Admitting: INTERNAL MEDICINE
Payer: COMMERCIAL

## 2024-05-21 VITALS
RESPIRATION RATE: 16 BRPM | BODY MASS INDEX: 29.55 KG/M2 | OXYGEN SATURATION: 97 % | DIASTOLIC BLOOD PRESSURE: 70 MMHG | SYSTOLIC BLOOD PRESSURE: 107 MMHG | HEART RATE: 68 BPM | WEIGHT: 195 LBS | HEIGHT: 68 IN

## 2024-05-21 LAB — COLONOSCOPY: NORMAL

## 2024-05-21 PROCEDURE — 250N000011 HC RX IP 250 OP 636: Mod: JZ | Performed by: INTERNAL MEDICINE

## 2024-05-21 PROCEDURE — G0500 MOD SEDAT ENDO SERVICE >5YRS: HCPCS | Performed by: INTERNAL MEDICINE

## 2024-05-21 PROCEDURE — G0121 COLON CA SCRN NOT HI RSK IND: HCPCS | Performed by: INTERNAL MEDICINE

## 2024-05-21 PROCEDURE — 45378 DIAGNOSTIC COLONOSCOPY: CPT | Performed by: INTERNAL MEDICINE

## 2024-05-21 RX ORDER — EPINEPHRINE 1 MG/ML
0.1 INJECTION, SOLUTION INTRAMUSCULAR; SUBCUTANEOUS
Status: DISCONTINUED | OUTPATIENT
Start: 2024-05-21 | End: 2024-05-21 | Stop reason: HOSPADM

## 2024-05-21 RX ORDER — FLUMAZENIL 0.1 MG/ML
0.2 INJECTION, SOLUTION INTRAVENOUS
Status: DISCONTINUED | OUTPATIENT
Start: 2024-05-21 | End: 2024-05-21 | Stop reason: HOSPADM

## 2024-05-21 RX ORDER — NALOXONE HYDROCHLORIDE 0.4 MG/ML
0.4 INJECTION, SOLUTION INTRAMUSCULAR; INTRAVENOUS; SUBCUTANEOUS
Status: DISCONTINUED | OUTPATIENT
Start: 2024-05-21 | End: 2024-05-21 | Stop reason: HOSPADM

## 2024-05-21 RX ORDER — ONDANSETRON 2 MG/ML
4 INJECTION INTRAMUSCULAR; INTRAVENOUS EVERY 6 HOURS PRN
Status: DISCONTINUED | OUTPATIENT
Start: 2024-05-21 | End: 2024-05-21 | Stop reason: HOSPADM

## 2024-05-21 RX ORDER — SIMETHICONE 40MG/0.6ML
133 SUSPENSION, DROPS(FINAL DOSAGE FORM)(ML) ORAL
Status: DISCONTINUED | OUTPATIENT
Start: 2024-05-21 | End: 2024-05-21 | Stop reason: HOSPADM

## 2024-05-21 RX ORDER — DIPHENHYDRAMINE HYDROCHLORIDE 50 MG/ML
25-50 INJECTION INTRAMUSCULAR; INTRAVENOUS
Status: DISCONTINUED | OUTPATIENT
Start: 2024-05-21 | End: 2024-05-21 | Stop reason: HOSPADM

## 2024-05-21 RX ORDER — NALOXONE HYDROCHLORIDE 0.4 MG/ML
0.2 INJECTION, SOLUTION INTRAMUSCULAR; INTRAVENOUS; SUBCUTANEOUS
Status: DISCONTINUED | OUTPATIENT
Start: 2024-05-21 | End: 2024-05-21 | Stop reason: HOSPADM

## 2024-05-21 RX ORDER — FENTANYL CITRATE 50 UG/ML
50-100 INJECTION, SOLUTION INTRAMUSCULAR; INTRAVENOUS EVERY 5 MIN PRN
Status: DISCONTINUED | OUTPATIENT
Start: 2024-05-21 | End: 2024-05-21 | Stop reason: HOSPADM

## 2024-05-21 RX ORDER — PROCHLORPERAZINE MALEATE 10 MG
10 TABLET ORAL EVERY 6 HOURS PRN
Status: DISCONTINUED | OUTPATIENT
Start: 2024-05-21 | End: 2024-05-21 | Stop reason: HOSPADM

## 2024-05-21 RX ORDER — ATROPINE SULFATE 0.1 MG/ML
1 INJECTION INTRAVENOUS
Status: COMPLETED | OUTPATIENT
Start: 2024-05-21 | End: 2024-05-21

## 2024-05-21 RX ORDER — LIDOCAINE 40 MG/G
CREAM TOPICAL
Status: DISCONTINUED | OUTPATIENT
Start: 2024-05-21 | End: 2024-05-21 | Stop reason: HOSPADM

## 2024-05-21 RX ORDER — ONDANSETRON 2 MG/ML
4 INJECTION INTRAMUSCULAR; INTRAVENOUS
Status: DISCONTINUED | OUTPATIENT
Start: 2024-05-21 | End: 2024-05-21 | Stop reason: HOSPADM

## 2024-05-21 RX ORDER — ONDANSETRON 4 MG/1
4 TABLET, ORALLY DISINTEGRATING ORAL EVERY 6 HOURS PRN
Status: DISCONTINUED | OUTPATIENT
Start: 2024-05-21 | End: 2024-05-21 | Stop reason: HOSPADM

## 2024-05-21 RX ADMIN — ATROPINE SULFATE 1 MG: 0.1 INJECTION INTRAVENOUS at 08:58

## 2024-05-21 RX ADMIN — FENTANYL CITRATE 100 MCG: 50 INJECTION, SOLUTION INTRAMUSCULAR; INTRAVENOUS at 08:50

## 2024-05-21 RX ADMIN — MIDAZOLAM 1 MG: 1 INJECTION INTRAMUSCULAR; INTRAVENOUS at 08:57

## 2024-05-21 RX ADMIN — MIDAZOLAM 2 MG: 1 INJECTION INTRAMUSCULAR; INTRAVENOUS at 08:50

## 2024-05-21 RX ADMIN — FENTANYL CITRATE 50 MCG: 50 INJECTION, SOLUTION INTRAMUSCULAR; INTRAVENOUS at 08:58

## 2024-05-21 ASSESSMENT — ACTIVITIES OF DAILY LIVING (ADL)
ADLS_ACUITY_SCORE: 35
ADLS_ACUITY_SCORE: 35

## 2024-05-21 NOTE — H&P
Pre-Endoscopy History and Physical     Joycelyn Laird MRN# [id number]   YOB: 1965 Age: 58 year old     Date of Procedure: 2024  Primary care provider: Paulo Arriola  Type of Endoscopy: Colonoscopy with possible biopsy, possible polypectomy  Reason for Procedure: screen  Type of Anesthesia Anticipated: Conscious Sedation    HPI:    Joycelyn is a 58 year old female who will be undergoing the above procedure.      A history and physical has been performed. The patient's medications and allergies have been reviewed. The risks and benefits of the procedure and the sedation options and risks were discussed with the patient.  All questions were answered and informed consent was obtained.      She denies a personal or family history of anesthesia complications or bleeding disorders.     Patient Active Problem List   Diagnosis    Fatty liver    Eczema    Glaucoma    Benign essential hypertension    Hyperlipidemia LDL goal <100    Prediabetes        Past Medical History:   Diagnosis Date    Eczema 2016    Endometrial cancer (H) 2023    Endometrial endometroid carcinoma, stage 2    Fatty liver     Glaucoma     Dr NICOLA Lofton    Hyperlipidemia LDL goal <100     Hypertension goal BP (blood pressure) < 140/90     Mild or unspecified pre-eclampsia, unspecified as to episode of care 1999    toxemia pregnancy-twins    Prediabetes     Primary localized osteoarthrosis, ankle and foot 10/18/2011        Past Surgical History:   Procedure Laterality Date     SECTION       - twins    COLONOSCOPY N/A 2017    normal, due 5 yrs    DENTAL SURGERY  1998    wisdom tooth    SURGICAL HISTORY OF -   2018    endometrial biopsy benign - Ob/Gyn - Dr Moore       Social History     Tobacco Use    Smoking status: Never    Smokeless tobacco: Never   Substance Use Topics    Alcohol use: Yes     Alcohol/week: 0.0 - 1.0 standard drinks of alcohol       Family History   Problem Relation Age of Onset  "   Hypertension Mother     Hyperlipidemia Mother     Atrial fibrillation Mother     Hypertension Father     C.A.D. Father 68    Prostate Cancer Father         Prostrate removed    Peripheral Vascular Disease Father     Hyperlipidemia Father     Skin Cancer Brother     Crohn's Disease Brother         had colectomy due to premalignant changes    Coronary Artery Disease Paternal Grandfather         Heart valve issues    Other - See Comments Yonny Gilmore    Other - See Comments Yonny Gilmore       Prior to Admission medications    Medication Sig Start Date End Date Taking? Authorizing Provider   calcium carbonate 500 mg, elemental, 1250 (500 Ca) MG tablet chewable Take 1 tablet by mouth    Reported, Patient   fish oil-omega-3 fatty acids 1000 MG capsule Take 1 g by mouth every other day  9/16/13   Gauri Early MD   irbesartan-hydrochlorothiazide (AVALIDE) 150-12.5 MG tablet TAKE 1/2 TABLET BY MOUTH DAILY 4/10/24   Laurie Mckeon APRN CNP   latanoprost (XALATAN) 0.005 % ophthalmic solution Place 1 drop into both eyes daily 3/23/19   Paulo Arriola MD   MULTIVITAMIN TABS   OR        triamcinolone (KENALOG) 0.1 % external ointment Apply topically 2 times daily To rash on body/hands as needed. 1/18/23   Kylee Garcia APRN CNP   Vitamin D3 (CHOLECALCIFEROL) 25 mcg (1000 units) tablet  5/1/22   Reported, Patient       No Known Allergies     REVIEW OF SYSTEMS:   5 point ROS negative except as noted above in HPI, including Gen., Resp., CV, GI &  system review.    PHYSICAL EXAM:   LMP 10/29/2011 (Exact Date)  Estimated body mass index is 31.02 kg/m  as calculated from the following:    Height as of 4/10/24: 1.727 m (5' 8\").    Weight as of 4/30/24: 92.5 kg (204 lb).   GENERAL APPEARANCE: alert, and oriented  MENTAL STATUS: alert  AIRWAY EXAM: Mallampatti Class I (visualization of the soft palate, fauces, uvula, anterior and posterior pillars)  RESP: lungs clear to auscultation - no rales, rhonchi " or wheezes  CV: regular rates and rhythm  DIAGNOSTICS:    Not indicated    IMPRESSION   ASA Class 2 - Mild systemic disease    PLAN:   Plan for Colonoscopy with possible biopsy, possible polypectomy. We discussed the risks, benefits and alternatives and the patient wished to proceed.    The above has been forwarded to the consulting provider.      Signed Electronically by: Harley Rg MD  May 21, 2024

## 2024-05-23 RX ORDER — IRBESARTAN AND HYDROCHLOROTHIAZIDE 150; 12.5 MG/1; MG/1
TABLET, FILM COATED ORAL
COMMUNITY
Start: 2024-05-23 | End: 2024-06-05

## 2024-05-31 ENCOUNTER — ALLIED HEALTH/NURSE VISIT (OUTPATIENT)
Dept: FAMILY MEDICINE | Facility: CLINIC | Age: 59
End: 2024-05-31
Payer: COMMERCIAL

## 2024-05-31 VITALS — HEART RATE: 68 BPM | DIASTOLIC BLOOD PRESSURE: 85 MMHG | SYSTOLIC BLOOD PRESSURE: 178 MMHG

## 2024-05-31 DIAGNOSIS — I10 BENIGN ESSENTIAL HYPERTENSION: Primary | ICD-10-CM

## 2024-05-31 PROCEDURE — 99207 PR NO CHARGE NURSE ONLY: CPT | Performed by: FAMILY MEDICINE

## 2024-05-31 NOTE — PROGRESS NOTES
Joycelyn Laird was evaluated at [facility] on May 31, 2024 at which time her blood pressure was:    BP Readings from Last 1 Encounters:   05/31/24 (!) 178/85     No data recorded      Reviewed lifestyle modifications for blood pressure control and reduction: including making healthy food choices, managing weight, getting regular exercise, smoking cessation, reducing alcohol consumption, monitoring blood pressure regularly.     Symptoms: None    BP Goal:< 140/90 mmHg    BP Assessment:  BP too high    Potential Reasons for BP too high: Dose of BP medication too low    BP Follow-Up Plan: Referral to PCP    Recommendation to Provider: Joycelyn has been taking 1/2 tablet daily of losartan / hydrochlorothiazide 150/12.5mg. Epic shows patient reported as she has takes 1 per day but patient indicates she has not been told to take 1 daily. She does not check her BP at home. Encouraged her to restart checking BP at home to see if she is closer to goal when not in clinic. No symptoms. She would benefit from increasing from 1/2 to 1 full tablet daily to get her to goal.    Laurie Zamora, PharmD  [facility]  PH: [phone]      Note completed by: Laurie Zamora, [facility]

## 2024-05-31 NOTE — PROGRESS NOTES
"Chart reviewed:     4/10 office visit with Laurie Mckeon, CNP  \"BP not under good control.   Take full tablet of medications instead of only 1/2 tablet.   Recheck BP in pharmacy and labs in 2 weeks.   - irbesartan-hydrochlorothiazide (AVALIDE) 150-12.5 MG tablet  Dispense: 45 tablet; Refill: 3\"       Called and spoke with patient.   Patient states the change was 1/4 to 1/2 tablet - which she did. Med history shows 1/2 tab ordered that day on 4/10   Disp Refills Start End CARLOS   irbesartan-hydrochlorothiazide (AVALIDE) 150-12.5 MG tablet (Discontinued) 45 tablet 3 4/10/2024 5/23/2024 No   Sig: TAKE 1/2 TABLET BY MOUTH DAILY     Was on a 1/4 tab prior due to dizziness on the 1/2 tablet     Currently on 1/2 tablet - not dizzy now.     Years ago was on lisinopril - stopped due to do coughing - ordered by Dr. Early  She put me on something else - that worked - forgot to tell dr. Arriola about that. If that one would be option.   (Med history looks like losartan 50 mg daily on 6/8/2020-9/21/2020 - discontinued at [facility] because BP was well-controlled)      Routing to provider to review and advise on BP meds -increase irbestartan-hydrochlorothiazide to 1 tab daily or alternate medication?    Kassie Rojo RN  [place] [facility]        "

## 2024-06-03 NOTE — PROGRESS NOTES
Spoke with patient and advised to increase to one full tablet daily then recheck with clinic in 2-3 weeks.

## 2024-06-05 ENCOUNTER — TELEPHONE (OUTPATIENT)
Dept: FAMILY MEDICINE | Facility: CLINIC | Age: 59
End: 2024-06-05
Payer: COMMERCIAL

## 2024-06-05 DIAGNOSIS — R73.03 PREDIABETES: ICD-10-CM

## 2024-06-05 DIAGNOSIS — I10 HYPERTENSION GOAL BP (BLOOD PRESSURE) < 140/90: Primary | ICD-10-CM

## 2024-06-05 RX ORDER — IRBESARTAN AND HYDROCHLOROTHIAZIDE 150; 12.5 MG/1; MG/1
TABLET, FILM COATED ORAL
Qty: 90 TABLET | Refills: 3 | Status: SHIPPED | OUTPATIENT
Start: 2024-06-05

## 2024-06-05 NOTE — TELEPHONE ENCOUNTER
Rx done    Close follow up    BP Readings from Last 3 Encounters:   05/31/24 (!) 178/85   05/21/24 107/70   05/17/24 (!) 151/91     Creatinine   Date Value Ref Range Status   05/14/2024 0.72 0.51 - 0.95 mg/dL Final   09/21/2020 0.70 0.52 - 1.04 mg/dL Final     GFR Estimate   Date Value Ref Range Status   05/14/2024 >90 >60 mL/min/1.73m2 Final   09/21/2020 >90 >60 mL/min/[1.73_m2] Final     Comment:     Non  GFR Calc  Starting 12/18/2018, serum creatinine based estimated GFR (eGFR) will be   calculated using the Chronic Kidney Disease Epidemiology Collaboration   (CKD-EPI) equation.

## 2024-06-05 NOTE — TELEPHONE ENCOUNTER
Cld pt advised rx is done and she can pu.  She was elated and happy.    Closed encounter    Dacia HUSSEIN

## 2024-06-05 NOTE — TELEPHONE ENCOUNTER
Patient calls to request a refill on the irbesartan-hydrochlorothiazide (AVALIDE) 150-12.5 MG tablet.  Patient states she was on 1/4 of a of the medication and then 1/2, and and now 1 Tablet.  Patient is leaving town and would ayan this medication refilled as soon as possible.

## 2024-06-21 ENCOUNTER — ALLIED HEALTH/NURSE VISIT (OUTPATIENT)
Dept: NURSING | Facility: CLINIC | Age: 59
End: 2024-06-21
Payer: COMMERCIAL

## 2024-06-21 VITALS
RESPIRATION RATE: 18 BRPM | WEIGHT: 203.3 LBS | OXYGEN SATURATION: 98 % | SYSTOLIC BLOOD PRESSURE: 132 MMHG | DIASTOLIC BLOOD PRESSURE: 76 MMHG | BODY MASS INDEX: 30.91 KG/M2 | HEART RATE: 77 BPM

## 2024-06-21 DIAGNOSIS — I10 HYPERTENSION GOAL BP (BLOOD PRESSURE) < 140/90: Primary | ICD-10-CM

## 2024-06-21 PROCEDURE — 99207 PR NO CHARGE NURSE ONLY: CPT

## 2024-06-21 NOTE — PROGRESS NOTES
Joycelyn Laird is being followed for Blood Pressure management.      BP Readings from Last 3 Encounters:   06/21/24 132/76   05/31/24 (!) 178/85   05/21/24 107/70       Wt Readings from Last 3 Encounters:   06/21/24 92.2 kg (203 lb 4.8 oz)   05/21/24 88.5 kg (195 lb)   04/30/24 92.5 kg (204 lb)       Is pulse 55 or greater? - Yes    Pulse Readings from Last 3 Encounters:   06/21/24 77   05/31/24 68   05/21/24 68       Current blood pressure medication(s): took BP med at 715am today - time of check 10:44  Current Outpatient Medications   Medication Sig Dispense Refill    calcium carbonate 500 mg, elemental, 1250 (500 Ca) MG tablet chewable Take 1 tablet by mouth      fish oil-omega-3 fatty acids 1000 MG capsule Take 1 g by mouth every other day  90 capsule 3    irbesartan-hydrochlorothiazide (AVALIDE) 150-12.5 MG tablet TAKE 1 TABLET BY MOUTH DAILY 90 tablet 3    latanoprost (XALATAN) 0.005 % ophthalmic solution Place 1 drop into both eyes daily      MULTIVITAMIN TABS   OR       triamcinolone (KENALOG) 0.1 % external ointment Apply topically 2 times daily To rash on body/hands as needed. 80 g 1    Vitamin D3 (CHOLECALCIFEROL) 25 mcg (1000 units) tablet             1. Follow up instructions include:     Per provider .      SUBJECTIVE:                                                    The patient is taking medication as prescribed and is tolerating well.   Patient is monitoring Blood Pressure at home.   Last 3 home readings     6/20 /89 1015am   6/19 116/80 - 3pm   6/18 125/89 1015am     130's 90 is the highest reading in  the last week.   140/90- average the first week she took one whole tab   irbesartan-hydrochlorothiazide (AVALIDE) 150-12.5 MG tablet 90 tablet 3 6/5/2024 -- No   Sig: TAKE 1 TABLET BY MOUTH DAILY       Out of the following complicating factors: Cough, Headache, Lightheadedness, Shortness of breath, Fatigue, Nausea, Sexual Dysfunction, New onset of swelling or edema, Weakness and New onset  of Chest Pain, the patient reports:  None    OBJECTIVE:                                                      Today's BP completed using cuff size: regular on left side  arm.      Potassium   Date Value Ref Range Status   05/14/2024 4.3 3.4 - 5.3 mmol/L Final   10/27/2022 4.0 3.4 - 5.3 mmol/L Final   09/21/2020 3.7 3.4 - 5.3 mmol/L Final     Creatinine   Date Value Ref Range Status   05/14/2024 0.72 0.51 - 0.95 mg/dL Final   09/21/2020 0.70 0.52 - 1.04 mg/dL Final     Urea Nitrogen   Date Value Ref Range Status   05/14/2024 11.6 6.0 - 20.0 mg/dL Final   10/27/2022 11 7 - 30 mg/dL Final   09/21/2020 12 7 - 30 mg/dL Final     GFR Estimate   Date Value Ref Range Status   05/14/2024 >90 >60 mL/min/1.73m2 Final   09/21/2020 >90 >60 mL/min/[1.73_m2] Final     Comment:     Non  GFR Calc  Starting 12/18/2018, serum creatinine based estimated GFR (eGFR) will be   calculated using the Chronic Kidney Disease Epidemiology Collaboration   (CKD-EPI) equation.           Education:  general discussion/verbal explanation  Ways to help improve BP/HTN:   Medications as directed   Call if home blood pressure is persistently greater than 130/80 at home and or experiencing symptoms   Lose weight  Diet low in fat and rich in fruits, vegetables and low fat dairy products  Reduce salt in diet  Do something active for at least 30 minutes a day on most days of the week  Cut down on alcohol (if you drink more than 2 drinks per day)  Decrease stress (exercise, read, yoga, meditation, time for self, etc.)   Patient was given an opportunity to ask questions.    Patient verbalized understanding of this plan and is agreeable.    Alice Del Angel RN

## 2024-08-15 ENCOUNTER — OFFICE VISIT (OUTPATIENT)
Dept: FAMILY MEDICINE | Facility: CLINIC | Age: 59
End: 2024-08-15
Payer: COMMERCIAL

## 2024-08-15 VITALS
WEIGHT: 206 LBS | TEMPERATURE: 97.5 F | DIASTOLIC BLOOD PRESSURE: 79 MMHG | BODY MASS INDEX: 31.22 KG/M2 | HEIGHT: 68 IN | RESPIRATION RATE: 12 BRPM | SYSTOLIC BLOOD PRESSURE: 122 MMHG | HEART RATE: 64 BPM | OXYGEN SATURATION: 98 %

## 2024-08-15 DIAGNOSIS — L98.9 SKIN LESION: Primary | ICD-10-CM

## 2024-08-15 PROCEDURE — G2211 COMPLEX E/M VISIT ADD ON: HCPCS | Performed by: FAMILY MEDICINE

## 2024-08-15 PROCEDURE — 99213 OFFICE O/P EST LOW 20 MIN: CPT | Performed by: FAMILY MEDICINE

## 2024-08-15 ASSESSMENT — PAIN SCALES - GENERAL: PAINLEVEL: NO PAIN (1)

## 2024-08-15 NOTE — PATIENT INSTRUCTIONS
Ceramides    There are several  moisturizers, body washes and cleansers on the market that have ceramides in them. Ceramides are a major part of the lipids (fats) that the skin needs to stay healthy.    The ones I recommend are:  - CeraVe (cream works better than lotion)  - Cetaphil Restoraderm   - Eucerin Professional Repair  - Curel Intensive Healing Cream    Cerave and Cetaphil have related body washes/cleansers available.  All are available over-the-counter, without a prescription.    If you have trouble locating them, [facility], [facility] and [facility] usually will stock them.

## 2024-08-15 NOTE — PROGRESS NOTES
"  Assessment & Plan     Skin lesion  Eczematous rash vs AK. Start over the counter hydrocortisone ointment twice daily for 2 weeks. Cover with good moisturizing cream. If still flaky or recurrent, recommend treating with cryotherapy.      BMI  Estimated body mass index is 31.32 kg/m  as calculated from the following:    Height as of this encounter: 1.727 m (5' 8\").    Weight as of this encounter: 93.4 kg (206 lb).         Christopher Hirsch is a 58 year old, presenting for the following health issues:  Derm Problem (Skin irritation- under the nose - wax and wane - flaky, red)        8/15/2024     7:40 AM   Additional Questions   Roomed by kecia piedra     History of Present Illness       Reason for visit:  Skin irritation    She eats 2-3 servings of fruits and vegetables daily.She consumes 1 sweetened beverage(s) daily.She exercises with enough effort to increase her heart rate 10 to 19 minutes per day.  She exercises with enough effort to increase her heart rate 3 or less days per week.   She is taking medications regularly.     Skin irritation- under the nose - waxes and wanes - flaky, red). It has been going on for a couple weeks. She had recently changed soap she uses and thought that could be related but no other areas of skin with similar rash. It sometimes itches and has bled when she picks the skin off. Has tried over the counter hydrocortisone cream which seems like it has been helpful at making rash less flaky.  No family history of skin cancer.         Review of Systems  Constitutional, HEENT, cardiovascular, pulmonary, gi and gu systems are negative, except as otherwise noted.      Objective    /79 (BP Location: Left arm, Patient Position: Sitting, Cuff Size: Adult Regular)   Pulse 64   Temp 97.5  F (36.4  C) (Oral)   Resp 12   Ht 1.727 m (5' 8\")   Wt 93.4 kg (206 lb)   LMP 10/29/2011 (Exact Date)   SpO2 98%   BMI 31.32 kg/m    Body mass index is 31.32 kg/m .  Physical Exam   GENERAL: " alert and no distress  SKIN: Flaky pink lesion on right upper lip           The longitudinal plan of care for the diagnosis(es)/condition(s) as documented were addressed during this visit. Due to the added complexity in care, I will continue to support Joycelyn in the subsequent management and with ongoing continuity of care.      Signed Electronically by: Flako Matt DO

## 2024-10-08 ENCOUNTER — PATIENT OUTREACH (OUTPATIENT)
Dept: CARE COORDINATION | Facility: CLINIC | Age: 59
End: 2024-10-08
Payer: COMMERCIAL

## 2024-11-21 SDOH — HEALTH STABILITY: PHYSICAL HEALTH: ON AVERAGE, HOW MANY DAYS PER WEEK DO YOU ENGAGE IN MODERATE TO STRENUOUS EXERCISE (LIKE A BRISK WALK)?: 2 DAYS

## 2024-11-21 SDOH — HEALTH STABILITY: PHYSICAL HEALTH: ON AVERAGE, HOW MANY MINUTES DO YOU ENGAGE IN EXERCISE AT THIS LEVEL?: 20 MIN

## 2024-11-21 ASSESSMENT — SOCIAL DETERMINANTS OF HEALTH (SDOH): HOW OFTEN DO YOU GET TOGETHER WITH FRIENDS OR RELATIVES?: TWICE A WEEK

## 2024-11-26 ENCOUNTER — OFFICE VISIT (OUTPATIENT)
Dept: FAMILY MEDICINE | Facility: CLINIC | Age: 59
End: 2024-11-26
Payer: COMMERCIAL

## 2024-11-26 VITALS
SYSTOLIC BLOOD PRESSURE: 132 MMHG | DIASTOLIC BLOOD PRESSURE: 84 MMHG | WEIGHT: 224.2 LBS | OXYGEN SATURATION: 98 % | BODY MASS INDEX: 32.1 KG/M2 | TEMPERATURE: 97.1 F | RESPIRATION RATE: 14 BRPM | HEART RATE: 63 BPM | HEIGHT: 70 IN

## 2024-11-26 DIAGNOSIS — C54.1 ENDOMETRIAL CANCER (H): ICD-10-CM

## 2024-11-26 DIAGNOSIS — R73.03 PREDIABETES: ICD-10-CM

## 2024-11-26 DIAGNOSIS — Z12.31 ENCOUNTER FOR SCREENING MAMMOGRAM FOR MALIGNANT NEOPLASM OF BREAST: ICD-10-CM

## 2024-11-26 DIAGNOSIS — Z23 NEED FOR INFLUENZA VACCINATION: ICD-10-CM

## 2024-11-26 DIAGNOSIS — Z00.00 ROUTINE GENERAL MEDICAL EXAMINATION AT A HEALTH CARE FACILITY: Primary | ICD-10-CM

## 2024-11-26 DIAGNOSIS — H40.9 GLAUCOMA OF BOTH EYES, UNSPECIFIED GLAUCOMA TYPE: ICD-10-CM

## 2024-11-26 DIAGNOSIS — E78.5 HYPERLIPIDEMIA LDL GOAL <100: ICD-10-CM

## 2024-11-26 DIAGNOSIS — L30.9 ECZEMA, UNSPECIFIED TYPE: ICD-10-CM

## 2024-11-26 DIAGNOSIS — Z51.81 MEDICATION MONITORING ENCOUNTER: ICD-10-CM

## 2024-11-26 DIAGNOSIS — Z13.820 SCREENING FOR OSTEOPOROSIS: ICD-10-CM

## 2024-11-26 DIAGNOSIS — Z12.11 SCREEN FOR COLON CANCER: ICD-10-CM

## 2024-11-26 DIAGNOSIS — Z23 NEED FOR COVID-19 VACCINE: ICD-10-CM

## 2024-11-26 DIAGNOSIS — K76.0 FATTY LIVER: ICD-10-CM

## 2024-11-26 DIAGNOSIS — I10 HYPERTENSION GOAL BP (BLOOD PRESSURE) < 140/90: ICD-10-CM

## 2024-11-26 LAB
ALBUMIN SERPL BCG-MCNC: 4.4 G/DL (ref 3.5–5.2)
ALBUMIN UR-MCNC: NEGATIVE MG/DL
ALP SERPL-CCNC: 59 U/L (ref 40–150)
ALT SERPL W P-5'-P-CCNC: 27 U/L (ref 0–50)
ANION GAP SERPL CALCULATED.3IONS-SCNC: 10 MMOL/L (ref 7–15)
APPEARANCE UR: CLEAR
AST SERPL W P-5'-P-CCNC: 32 U/L (ref 0–45)
BACTERIA #/AREA URNS HPF: ABNORMAL /HPF
BILIRUB SERPL-MCNC: 0.5 MG/DL
BILIRUB UR QL STRIP: NEGATIVE
BUN SERPL-MCNC: 13.1 MG/DL (ref 6–20)
CALCIUM SERPL-MCNC: 9.8 MG/DL (ref 8.8–10.4)
CHLORIDE SERPL-SCNC: 103 MMOL/L (ref 98–107)
CHOLEST SERPL-MCNC: 183 MG/DL
CK SERPL-CCNC: 147 U/L (ref 26–192)
COLOR UR AUTO: YELLOW
CREAT SERPL-MCNC: 0.76 MG/DL (ref 0.51–0.95)
CREAT UR-MCNC: 133 MG/DL
EGFRCR SERPLBLD CKD-EPI 2021: >90 ML/MIN/1.73M2
ERYTHROCYTE [DISTWIDTH] IN BLOOD BY AUTOMATED COUNT: 11.4 % (ref 10–15)
EST. AVERAGE GLUCOSE BLD GHB EST-MCNC: 114 MG/DL
FASTING STATUS PATIENT QL REPORTED: YES
FASTING STATUS PATIENT QL REPORTED: YES
GLUCOSE SERPL-MCNC: 100 MG/DL (ref 70–99)
GLUCOSE UR STRIP-MCNC: NEGATIVE MG/DL
HBA1C MFR BLD: 5.6 % (ref 0–5.6)
HCO3 SERPL-SCNC: 27 MMOL/L (ref 22–29)
HCT VFR BLD AUTO: 38.5 % (ref 35–47)
HDLC SERPL-MCNC: 42 MG/DL
HGB BLD-MCNC: 13.4 G/DL (ref 11.7–15.7)
HGB UR QL STRIP: ABNORMAL
KETONES UR STRIP-MCNC: NEGATIVE MG/DL
LDLC SERPL CALC-MCNC: 118 MG/DL
LEUKOCYTE ESTERASE UR QL STRIP: NEGATIVE
MCH RBC QN AUTO: 30 PG (ref 26.5–33)
MCHC RBC AUTO-ENTMCNC: 34.8 G/DL (ref 31.5–36.5)
MCV RBC AUTO: 86 FL (ref 78–100)
MICROALBUMIN UR-MCNC: <12 MG/L
MICROALBUMIN/CREAT UR: NORMAL MG/G{CREAT}
NITRATE UR QL: NEGATIVE
NONHDLC SERPL-MCNC: 141 MG/DL
PH UR STRIP: 6 [PH] (ref 5–7)
PLATELET # BLD AUTO: 231 10E3/UL (ref 150–450)
POTASSIUM SERPL-SCNC: 4.2 MMOL/L (ref 3.4–5.3)
PROT SERPL-MCNC: 7.4 G/DL (ref 6.4–8.3)
RBC # BLD AUTO: 4.46 10E6/UL (ref 3.8–5.2)
RBC #/AREA URNS AUTO: ABNORMAL /HPF
SODIUM SERPL-SCNC: 140 MMOL/L (ref 135–145)
SP GR UR STRIP: >=1.03 (ref 1–1.03)
SQUAMOUS #/AREA URNS AUTO: ABNORMAL /LPF
TRIGL SERPL-MCNC: 115 MG/DL
TSH SERPL DL<=0.005 MIU/L-ACNC: 2.38 UIU/ML (ref 0.3–4.2)
UROBILINOGEN UR STRIP-ACNC: 0.2 E.U./DL
WBC # BLD AUTO: 3.8 10E3/UL (ref 4–11)
WBC #/AREA URNS AUTO: ABNORMAL /HPF

## 2024-11-26 PROCEDURE — 90480 ADMN SARSCOV2 VAC 1/ONLY CMP: CPT | Performed by: FAMILY MEDICINE

## 2024-11-26 PROCEDURE — 81001 URINALYSIS AUTO W/SCOPE: CPT | Performed by: FAMILY MEDICINE

## 2024-11-26 PROCEDURE — 85027 COMPLETE CBC AUTOMATED: CPT | Performed by: FAMILY MEDICINE

## 2024-11-26 PROCEDURE — 91320 SARSCV2 VAC 30MCG TRS-SUC IM: CPT | Performed by: FAMILY MEDICINE

## 2024-11-26 PROCEDURE — 36415 COLL VENOUS BLD VENIPUNCTURE: CPT | Performed by: FAMILY MEDICINE

## 2024-11-26 PROCEDURE — 90673 RIV3 VACCINE NO PRESERV IM: CPT | Performed by: FAMILY MEDICINE

## 2024-11-26 PROCEDURE — 82570 ASSAY OF URINE CREATININE: CPT | Performed by: FAMILY MEDICINE

## 2024-11-26 PROCEDURE — 82550 ASSAY OF CK (CPK): CPT | Performed by: FAMILY MEDICINE

## 2024-11-26 PROCEDURE — 82043 UR ALBUMIN QUANTITATIVE: CPT | Performed by: FAMILY MEDICINE

## 2024-11-26 PROCEDURE — 83036 HEMOGLOBIN GLYCOSYLATED A1C: CPT | Performed by: FAMILY MEDICINE

## 2024-11-26 PROCEDURE — 90471 IMMUNIZATION ADMIN: CPT | Performed by: FAMILY MEDICINE

## 2024-11-26 PROCEDURE — 84443 ASSAY THYROID STIM HORMONE: CPT | Performed by: FAMILY MEDICINE

## 2024-11-26 PROCEDURE — 80053 COMPREHEN METABOLIC PANEL: CPT | Performed by: FAMILY MEDICINE

## 2024-11-26 PROCEDURE — 99214 OFFICE O/P EST MOD 30 MIN: CPT | Mod: 25 | Performed by: FAMILY MEDICINE

## 2024-11-26 PROCEDURE — 99396 PREV VISIT EST AGE 40-64: CPT | Mod: 25 | Performed by: FAMILY MEDICINE

## 2024-11-26 PROCEDURE — 80061 LIPID PANEL: CPT | Performed by: FAMILY MEDICINE

## 2024-11-26 RX ORDER — IRBESARTAN AND HYDROCHLOROTHIAZIDE 150; 12.5 MG/1; MG/1
TABLET, FILM COATED ORAL
Qty: 90 TABLET | Refills: 3 | Status: SHIPPED | OUTPATIENT
Start: 2024-11-26

## 2024-11-26 NOTE — PROGRESS NOTES
Preventive Care Visit  [facility] [place]  Paulo Arriola MD, Family Medicine  Nov 26, 2024      Assessment & Plan     Routine general medical examination at a health care facility    - Comprehensive metabolic panel  - Lipid panel reflex to direct LDL Fasting  - CBC with platelets  - CK total  - UA Macroscopic with reflex to Microscopic and Culture  - Albumin Random Urine Quantitative with Creat Ratio  - TSH with free T4 reflex  - Fecal colorectal cancer screen FIT  - Hemoglobin A1c  - REVIEW OF HEALTH MAINTENANCE PROTOCOL ORDERS  - Comprehensive metabolic panel  - Lipid panel reflex to direct LDL Fasting  - CBC with platelets  - CK total  - UA Macroscopic with reflex to Microscopic and Culture  - Albumin Random Urine Quantitative with Creat Ratio  - TSH with free T4 reflex  - Fecal colorectal cancer screen FIT  - Hemoglobin A1c  - UA Microscopic with Reflex to Culture  - PRIMARY CARE FOLLOW-UP SCHEDULING    Endometrial cancer (H)    - REVIEW OF HEALTH MAINTENANCE PROTOCOL ORDERS  - PRIMARY CARE FOLLOW-UP SCHEDULING    Prediabetes    - Comprehensive metabolic panel  - Lipid panel reflex to direct LDL Fasting  - CBC with platelets  - UA Macroscopic with reflex to Microscopic and Culture  - Albumin Random Urine Quantitative with Creat Ratio  - Hemoglobin A1c  - REVIEW OF HEALTH MAINTENANCE PROTOCOL ORDERS  - Comprehensive metabolic panel  - Lipid panel reflex to direct LDL Fasting  - CBC with platelets  - UA Macroscopic with reflex to Microscopic and Culture  - Albumin Random Urine Quantitative with Creat Ratio  - Hemoglobin A1c  - UA Microscopic with Reflex to Culture  - irbesartan-hydrochlorothiazide (AVALIDE) 150-12.5 MG tablet  Dispense: 90 tablet; Refill: 3  - PRIMARY CARE FOLLOW-UP SCHEDULING    Hypertension goal BP (blood pressure) < 140/90    - Comprehensive metabolic panel  - UA Macroscopic with reflex to Microscopic and Culture  - Albumin Random Urine Quantitative with Creat Ratio  - REVIEW OF  HEALTH MAINTENANCE PROTOCOL ORDERS  - Comprehensive metabolic panel  - UA Macroscopic with reflex to Microscopic and Culture  - Albumin Random Urine Quantitative with Creat Ratio  - UA Microscopic with Reflex to Culture  - irbesartan-hydrochlorothiazide (AVALIDE) 150-12.5 MG tablet  Dispense: 90 tablet; Refill: 3  - PRIMARY CARE FOLLOW-UP SCHEDULING    Hyperlipidemia LDL goal <100    - Comprehensive metabolic panel  - Lipid panel reflex to direct LDL Fasting  - CK total  - REVIEW OF HEALTH MAINTENANCE PROTOCOL ORDERS  - Comprehensive metabolic panel  - Lipid panel reflex to direct LDL Fasting  - CK total  - PRIMARY CARE FOLLOW-UP SCHEDULING    Fatty liver    - Comprehensive metabolic panel  - REVIEW OF HEALTH MAINTENANCE PROTOCOL ORDERS  - Comprehensive metabolic panel  - PRIMARY CARE FOLLOW-UP SCHEDULING    Glaucoma of both eyes, unspecified glaucoma type    - REVIEW OF HEALTH MAINTENANCE PROTOCOL ORDERS  - PRIMARY CARE FOLLOW-UP SCHEDULING    Eczema, unspecified type    - REVIEW OF HEALTH MAINTENANCE PROTOCOL ORDERS  - PRIMARY CARE FOLLOW-UP SCHEDULING    Screening for osteoporosis    - REVIEW OF HEALTH MAINTENANCE PROTOCOL ORDERS  - PRIMARY CARE FOLLOW-UP SCHEDULING    Encounter for screening mammogram for malignant neoplasm of breast    - REVIEW OF HEALTH MAINTENANCE PROTOCOL ORDERS  - PRIMARY CARE FOLLOW-UP SCHEDULING  - MA Screen Bilateral w/Seng    Screen for colon cancer    - Fecal colorectal cancer screen FIT  - REVIEW OF HEALTH MAINTENANCE PROTOCOL ORDERS  - Fecal colorectal cancer screen FIT  - PRIMARY CARE FOLLOW-UP SCHEDULING    Medication monitoring encounter    - Comprehensive metabolic panel  - Lipid panel reflex to direct LDL Fasting  - CBC with platelets  - CK total  - UA Macroscopic with reflex to Microscopic and Culture  - Albumin Random Urine Quantitative with Creat Ratio  - TSH with free T4 reflex  - Fecal colorectal cancer screen FIT  - Hemoglobin A1c  - REVIEW OF HEALTH MAINTENANCE PROTOCOL  "ORDERS  - Comprehensive metabolic panel  - Lipid panel reflex to direct LDL Fasting  - CBC with platelets  - CK total  - UA Macroscopic with reflex to Microscopic and Culture  - Albumin Random Urine Quantitative with Creat Ratio  - TSH with free T4 reflex  - Fecal colorectal cancer screen FIT  - Hemoglobin A1c  - UA Microscopic with Reflex to Culture  - PRIMARY CARE FOLLOW-UP SCHEDULING    Need for influenza vaccination    - INFLUENZA VACCINE TRIVALENT(FLUBLOK)    Need for COVID-19 vaccine    - COVID-19 12+ (PFIZER)      Patient has been advised of split billing requirements and indicates understanding: Yes    BMI  Estimated body mass index is 32.17 kg/m  as calculated from the following:    Height as of this encounter: 1.778 m (5' 10\").    Weight as of this encounter: 101.7 kg (224 lb 3.2 oz).   Weight management plan: diet, exercise, offered weight loss clinic    Counseling  Appropriate preventive services were addressed with this patient via screening, questionnaire, or discussion as appropriate for fall prevention, nutrition, physical activity, Tobacco-use cessation, social engagement, weight loss and cognition.  Checklist reviewing preventive services available has been given to the patient.  Reviewed patient's diet, addressing concerns and/or questions.   She is at risk for lack of exercise and has been provided with information to increase physical activity for the benefit of her well-being.   She is at risk for psychosocial distress and has been provided with information to reduce risk.     Work on weight loss  Regular exercise    Plan:    1) Medications: reviewed, refilled    2) Labs: reviewed, pending    3) Immunizations: Covid, flu, consider prevnar 20    4) Imaging/Diagnostics: Mammogram    5) [facility]: [facility]    Return in about 1 year (around 11/26/2025) for Complete Physical, Medication Recheck Visit, Follow Up Chronic.    33 minutes spent by myself on the date of the " encounter doing chart review, history and exam, documentation and further activities per the note.    The longitudinal plan of care for the diagnosis(es)/condition(s) as documented were addressed during this visit. Due to the added complexity in care, I will continue to support Pat in the subsequent management and with ongoing continuity of care.    Christopher Hirsch is a 59 year old, presenting for the following:  Physical        11/26/2024     7:58 AM   Additional Questions   Roomed by Casandra RANGEL   Accompanied by spouse        Patient is fasting.    Endometrial Cancer - [place] - S/P Hysterectomy - follows every 6 months    Prediabetes    Lab Results   Component Value Date    A1C 5.6 11/26/2024    A1C 5.5 05/14/2024    A1C 5.4 11/07/2023    A1C 5.6 07/19/2022    A1C 5.2 03/17/2022    A1C 5.6 04/12/2018    A1C 5.5 07/18/2016    A1C 5.4 09/29/2014     Hypertension Follow-up    Do you check your blood pressure regularly outside of the clinic? No   Are you following a low salt diet? Yes  Are your blood pressures ever more than 140 on the top number (systolic) OR more   than 90 on the bottom number (diastolic), for example 140/90? No    BP Readings from Last 3 Encounters:   11/26/24 132/84   08/15/24 122/79   06/21/24 132/76     Creatinine   Date Value Ref Range Status   05/14/2024 0.72 0.51 - 0.95 mg/dL Final   09/21/2020 0.70 0.52 - 1.04 mg/dL Final     GFR Estimate   Date Value Ref Range Status   05/14/2024 >90 >60 mL/min/1.73m2 Final   09/21/2020 >90 >60 mL/min/[1.73_m2] Final     Comment:     Non  GFR Calc  Starting 12/18/2018, serum creatinine based estimated GFR (eGFR) will be   calculated using the Chronic Kidney Disease Epidemiology Collaboration   (CKD-EPI) equation.       Lipids    Recent Labs   Lab Test 05/14/24  0809 11/07/23  0730   CHOL 167 179   HDL 42* 48*   * 119*   TRIG 116 59     Fatty Liver    Glaucoma - no issues - controlled - uses drops - follows with Dr Kirsty Harris -  stable    Health Care Directive  Patient does not have a Health Care Directive: Discussed advance care planning with patient; information given to patient to review.      11/21/2024   General Health   How would you rate your overall physical health? Good   Feel stress (tense, anxious, or unable to sleep) To some extent      (!) STRESS CONCERN      11/21/2024   Nutrition   Three or more servings of calcium each day? Yes   Diet: Breakfast skipped   How many servings of fruit and vegetables per day? (!) 2-3   How many sweetened beverages each day? 0-1            11/21/2024   Exercise   Days per week of moderate/strenous exercise 2 days   Average minutes spent exercising at this level 20 min      (!) EXERCISE CONCERN      11/21/2024   Social Factors   Frequency of gathering with friends or relatives Twice a week   Worry food won't last until get money to buy more No   Food not last or not have enough money for food? No   Do you have housing? (Housing is defined as stable permanent housing and does not include staying ouside in a car, in a tent, in an abandoned building, in an overnight shelter, or couch-surfing.) Yes   Are you worried about losing your housing? No   Lack of transportation? No   Unable to get utilities (heat,electricity)? No            11/21/2024   Fall Risk   Fallen 2 or more times in the past year? No    Trouble with walking or balance? No        Patient-reported          11/21/2024   Dental   Dentist two times every year? Yes            11/21/2024   TB Screening   Were you born outside of the [place]? No              Today's PHQ-2 Score:       4/9/2024     3:52 PM   PHQ-2 ( 1999 Pf[employer])   Q1: Little interest or pleasure in doing things 0    Q2: Feeling down, depressed or hopeless 0    PHQ-2 Score 0   Q1: Little interest or pleasure in doing things Not at all   Q2: Feeling down, depressed or hopeless Not at all   PHQ-2 Score 0       Patient-reported         11/21/2024   Substance Use   Alcohol more than  3/day or more than 7/wk No   Do you use any other substances recreationally? No        Social History     Tobacco Use    Smoking status: Never    Smokeless tobacco: Never   Vaping Use    Vaping status: Never Used   Substance Use Topics    Alcohol use: Yes     Alcohol/week: 0.0 - 1.0 standard drinks of alcohol     Comment: occas    Drug use: No           3/14/2024   LAST FHS-7 RESULTS   1st degree relative breast or ovarian cancer No           Annual mammogram        2024   STI Screening   New sexual partner(s) since last STI/HIV test? No        History of abnormal Pap smear: Status post hysterectomy with removal of cervix and no history of CIN2 or greater or cervical cancer. Health Maintenance and Surgical History updated.        Latest Ref Rng & Units 2019     8:55 AM 2019     8:37 AM 2016    12:00 AM   PAP / HPV   PAP (Historical)   NIL  NIL    HPV 16 DNA NEG^Negative Negative      HPV 18 DNA NEG^Negative Negative      Other HR HPV NEG^Negative Negative        ASCVD Risk   The 10-year ASCVD risk score (Jamie DE SANTIAGO, et al., 2019) is: 3.7%    Values used to calculate the score:      Age: 59 years      Sex: Female      Is Non- : No      Diabetic: No      Tobacco smoker: No      Systolic Blood Pressure: 132 mmHg      Is BP treated: Yes      HDL Cholesterol: 42 mg/dL      Total Cholesterol: 167 mg/dL    Fracture Risk Assessment Tool  Link to Frax Calculator  Use the information below to complete the Frax calculator  : 1965  Sex: female  Weight (kg): 101.7 kg (actual weight)  Height (cm): 177.8 cm  Previous Fragility Fracture:  No  History of parent with fractured hip:  No  Current Smoking:  No  Patient has been on glucocorticoids for more than 3 months (5mg/day or more): No  Rheumatoid Arthritis on Problem List:  No  Secondary Osteoporosis on Problem List:  No  Consumes 3 or more units of alcohol per day: No  Femoral Neck BMD (g/cm2)             Reviewed and  updated as needed this visit by Provider                  Patient Active Problem List   Diagnosis    Fatty liver    Eczema    Glaucoma    Hypertension goal BP (blood pressure) < 140/90    Hyperlipidemia LDL goal <100    Prediabetes    Endometrial cancer (H)       Past Medical History:   Diagnosis Date    Eczema 2016    Endometrial cancer (H) 2023    [place] - S/P Hysterectomy 2024 - Endometrial endometroid carcinoma, stage 2    Fatty liver 2013    Glaucoma     Dr NICOLA Lofton    Hyperlipidemia LDL goal <100     Hypertension goal BP (blood pressure) < 140/90     Mild or unspecified pre-eclampsia, unspecified as to episode of care 1999    toxemia pregnancy-twins    Prediabetes     Primary localized osteoarthrosis, ankle and foot 10/18/2011       Past Surgical History:   Procedure Laterality Date    BIOPSY  11/15/17 & 24    biopsy of endometrium of uterus - no cancer in 2017. In  it was diagnoses as Stage 2 endometrial cancer     SECTION       - twins    COLONOSCOPY N/A 2017    normal, due 5 yrs    COLONOSCOPY N/A 2024    normal / negative - due 10 yrs    DENTAL SURGERY      wisdom tooth    GENITOURINARY SURGERY  1/3/18    hysteroscopy - for polups - non cancerous    GYN SURGERY  2024    hysterectomy  - for endometrial cancer    HYSTERECTOMY, PAP NO LONGER INDICATED      HYSTERECTOMY, PAP NO LONGER INDICATED      SURGICAL HISTORY OF -   2018    endometrial biopsy benign - Ob/Gyn - Dr Moore       Current Outpatient Medications   Medication Sig Dispense Refill    calcium carbonate 500 mg, elemental, 1250 (500 Ca) MG tablet chewable Take 1 tablet by mouth      fish oil-omega-3 fatty acids 1000 MG capsule Take 1 g by mouth every other day  90 capsule 3    irbesartan-hydrochlorothiazide (AVALIDE) 150-12.5 MG tablet TAKE 1 TABLET BY MOUTH DAILY 90 tablet 3    latanoprost (XALATAN) 0.005 % ophthalmic solution Place 1 drop into both eyes daily      MULTIVITAMIN  TABS   OR       triamcinolone (KENALOG) 0.1 % external ointment Apply topically 2 times daily To rash on body/hands as needed. 80 g 1    Vitamin D3 (CHOLECALCIFEROL) 25 mcg (1000 units) tablet          No Known Allergies    Family History   Problem Relation Age of Onset    Hypertension Mother     Hyperlipidemia Mother     Atrial fibrillation Mother     Dementia Mother     Hypertension Father     C.A.D. Father 68    Prostate Cancer Father         Prostrate removed    Peripheral Vascular Disease Father     Hyperlipidemia Father     Skin Cancer Brother     Crohn's Disease Brother         had colectomy due to premalignant changes    Coronary Artery Disease Paternal Grandfather         Heart valve issues    Other - See Comments Yonny Gilmore    Other - See Comments Yonny Gilmore    Colon Cancer No family hx of        Social History     Socioeconomic History    Marital status:      Spouse name: Phu    Number of children: 2    Years of education: 16    Highest education level: None   Occupational History     Employer: NONE    Tobacco Use    Smoking status: Never    Smokeless tobacco: Never   Vaping Use    Vaping status: Never Used   Substance and Sexual Activity    Alcohol use: Yes     Alcohol/week: 0.0 - 1.0 standard drinks of alcohol     Comment: occas    Drug use: No    Sexual activity: Yes     Partners: Male     Birth control/protection: Post-menopausal   Other Topics Concern    Exercise No    Seat Belt Yes    Self-Exams Yes     Comment: Occaisionally    Parent/sibling w/ CABG, MI or angioplasty before 65F 55M? No     Social Drivers of Health     Financial Resource Strain: Low Risk  (11/21/2024)    Financial Resource Strain     Within the past 12 months, have you or your family members you live with been unable to get utilities (heat, electricity) when it was really needed?: No   Food Insecurity: Low Risk  (11/21/2024)    Food Insecurity     Within the past 12 months, did you worry that your  food would run out before you got money to buy more?: No     Within the past 12 months, did the food you bought just not last and you didn t have money to get more?: No   Transportation Needs: Low Risk  (11/21/2024)    Transportation Needs     Within the past 12 months, has lack of transportation kept you from medical appointments, getting your medicines, non-medical meetings or appointments, work, or from getting things that you need?: No   Physical Activity: Insufficiently Active (11/21/2024)    Exercise Vital Sign     Days of Exercise per Week: 2 days     Minutes of Exercise per Session: 20 min   Stress: Stress Concern Present (11/21/2024)    [employer] [place] [employer] - Occupational Stress Questionnaire     Feeling of Stress : To some extent   Social Connections: Unknown (11/21/2024)    Social Connection and Isolation Panel [NHANES]     Frequency of Social Gatherings with Friends and Family: Twice a week   Interpersonal Safety: Low Risk  (11/7/2023)    Interpersonal Safety     Do you feel physically and emotionally safe where you currently live?: Yes     Within the past 12 months, have you been hit, slapped, kicked or otherwise physically hurt by someone?: No     Within the past 12 months, have you been humiliated or emotionally abused in other ways by your partner or ex-partner?: No   Housing Stability: Low Risk  (11/21/2024)    Housing Stability     Do you have housing? : Yes     Are you worried about losing your housing?: No       Colonoscopy: due 5/2034  FIT / Cologuard: NA  Pap: NA, hysterectomy  Mammogram: due 3/2025  DEXA: due 3/2029      Review of Systems  CONSTITUTIONAL: NEGATIVE for fever, chills, change in weight  INTEGUMENTARY/SKIN: NEGATIVE for worrisome rashes, moles or lesions  EYES: NEGATIVE for vision changes or irritation  ENT/MOUTH: NEGATIVE for ear, mouth and throat problems  RESP: NEGATIVE for significant cough or SOB  BREAST: NEGATIVE for masses, tenderness or discharge  CV:  "NEGATIVE for chest pain, palpitations or peripheral edema  GI: NEGATIVE for nausea, abdominal pain, heartburn, or change in bowel habits  : NEGATIVE for frequency, dysuria, or hematuria  MUSCULOSKELETAL: NEGATIVE for significant arthralgias or myalgia  NEURO: NEGATIVE for weakness, dizziness or paresthesias  ENDOCRINE: NEGATIVE for temperature intolerance, skin/hair changes  HEME: NEGATIVE for bleeding problems  PSYCHIATRIC: NEGATIVE for changes in mood or affect     Objective    Exam  /84   Pulse 63   Temp 97.1  F (36.2  C) (Tympanic)   Resp 14   Ht 1.778 m (5' 10\")   Wt 101.7 kg (224 lb 3.2 oz)   LMP 10/29/2011 (Exact Date)   SpO2 98%   BMI 32.17 kg/m     Estimated body mass index is 32.17 kg/m  as calculated from the following:    Height as of this encounter: 1.778 m (5' 10\").    Weight as of this encounter: 101.7 kg (224 lb 3.2 oz).    Physical Exam  GENERAL: alert and no distress  EYES: Eyes grossly normal to inspection, PERRL and conjunctivae and sclerae normal  HENT: ear canals and TM's normal, nose and mouth without ulcers or lesions  NECK: no adenopathy, no asymmetry, masses, or scars  RESP: lungs clear to auscultation - no rales, rhonchi or wheezes  BREAST: per [place] / mammo  CV: regular rate and rhythm, normal S1 S2, no S3 or S4, no murmur, click or rub, no peripheral edema  ABDOMEN: soft, nontender, no hepatosplenomegaly, no masses and bowel sounds normal   (female): per [place]  RECTAL: per [place]  MS: no gross musculoskeletal defects noted, no edema  SKIN: no suspicious lesions or rashes  NEURO: Normal strength and tone, mentation intact and speech normal  PSYCH: mentation appears normal, affect normal/bright    Signed Electronically by:              Paulo Arriola MD, FAAFP     [facility]  [address] [place] [id number]  [email or web address]   Office: [phone]  Fax: [phone]       "

## 2024-12-01 DIAGNOSIS — Z51.81 MEDICATION MONITORING ENCOUNTER: ICD-10-CM

## 2024-12-01 DIAGNOSIS — E78.5 HYPERLIPIDEMIA LDL GOAL <100: ICD-10-CM

## 2024-12-01 DIAGNOSIS — I10 HYPERTENSION GOAL BP (BLOOD PRESSURE) < 140/90: ICD-10-CM

## 2024-12-01 DIAGNOSIS — R73.03 PREDIABETES: Primary | ICD-10-CM

## 2025-01-02 ENCOUNTER — OFFICE VISIT (OUTPATIENT)
Dept: FAMILY MEDICINE | Facility: CLINIC | Age: 60
End: 2025-01-02
Payer: COMMERCIAL

## 2025-01-02 VITALS
SYSTOLIC BLOOD PRESSURE: 125 MMHG | OXYGEN SATURATION: 98 % | RESPIRATION RATE: 18 BRPM | HEART RATE: 78 BPM | TEMPERATURE: 98.1 F | DIASTOLIC BLOOD PRESSURE: 80 MMHG

## 2025-01-02 DIAGNOSIS — H10.33 ACUTE BACTERIAL CONJUNCTIVITIS OF BOTH EYES: Primary | ICD-10-CM

## 2025-01-02 RX ORDER — OFLOXACIN 3 MG/ML
1-2 SOLUTION/ DROPS OPHTHALMIC 4 TIMES DAILY
Qty: 5 ML | Refills: 0 | Status: SHIPPED | OUTPATIENT
Start: 2025-01-02 | End: 2025-01-07

## 2025-01-02 NOTE — PROGRESS NOTES
Assessment & Plan     Acute bacterial conjunctivitis of both eyes    - ofloxacin (OCUFLOX) 0.3 % ophthalmic solution; Place 1-2 drops into both eyes 4 times daily for 5 days.      Return in about 3 days (around 1/5/2025), or if symptoms worsen or fail to improve.    Subjective   Joycelyn is a 59 year old, presenting for the following health issues:  Urgent Care (Pt states she has had eye discharge, itchiness, and redness in both eyes. )    HPI       Eye(s) Problem  Onset/Duration: 4 days  Description:   Location: Bilateral  Pain: No  Redness: YES  Accompanying Signs & Symptoms:  Discharge/mattering: YES  Swelling: No  Visual changes: No  Fever: No  Nasal Congestion: No  Bothered by bright lights: No  History:  Trauma: No  Foreign body exposure: No  Wearing contacts: YES  Precipitating or alleviating factors: son had pinkeye last week  Therapies tried and outcome: None        Review of Systems  Constitutional, HEENT, cardiovascular, pulmonary, gi and gu systems are negative, except as otherwise noted.      Objective    /80   Pulse 78   Temp 98.1  F (36.7  C) (Tympanic)   Resp 18   LMP 10/29/2011 (Exact Date)   SpO2 98%   There is no height or weight on file to calculate BMI.  Physical Exam   GENERAL: alert and no distress  EYES: PERRL, EOMI, and conjunctiva/corneas- conjunctival injection OU  HENT: ear canals and TM's normal, nose and mouth without ulcers or lesions  NECK: no adenopathy, no asymmetry, masses, or scars  MS: no gross musculoskeletal defects noted, no edema            Signed Electronically by: [facility]

## 2025-01-02 NOTE — PATIENT INSTRUCTIONS
Do not wear contacts until cleared and off the antibiotic eye drops  Change your pillow case daily for the next 2 days  Wash hands frequently  Refrain from touching the eyes  Throw out any mascara, eyeliner used in the past 4 days and wash your makeup brushes and allow them to completely dry.

## 2025-01-08 ENCOUNTER — TELEPHONE (OUTPATIENT)
Dept: VASCULAR SURGERY | Facility: CLINIC | Age: 60
End: 2025-01-08

## 2025-01-08 ENCOUNTER — OFFICE VISIT (OUTPATIENT)
Dept: DERMATOLOGY | Facility: CLINIC | Age: 60
End: 2025-01-08
Payer: COMMERCIAL

## 2025-01-08 DIAGNOSIS — D48.9 NEOPLASM OF UNCERTAIN BEHAVIOR: ICD-10-CM

## 2025-01-08 DIAGNOSIS — Z12.83 SKIN CANCER SCREENING: ICD-10-CM

## 2025-01-08 DIAGNOSIS — L81.4 LENTIGO: ICD-10-CM

## 2025-01-08 DIAGNOSIS — I83.92 SPIDER VEIN OF LEFT LOWER EXTREMITY: Primary | ICD-10-CM

## 2025-01-08 DIAGNOSIS — L82.1 SEBORRHEIC KERATOSIS: ICD-10-CM

## 2025-01-08 DIAGNOSIS — D18.01 CHERRY ANGIOMA: ICD-10-CM

## 2025-01-08 DIAGNOSIS — D22.9 MULTIPLE NEVI: ICD-10-CM

## 2025-01-08 NOTE — LETTER
1/8/2025      Joycelyn Laird  [address] [facility] [address]      Dear Colleague,    Thank you for referring your patient, Joycelyn Laird, to the [facility]. Please see a copy of my visit note below.    [facility] Dermatology Note  Encounter Date: Jan 8, 2025  Office Visit     Reviewed patients past medical history and pertinent chart review prior to patients visit today.     Dermatology Problem List:  Last skin check: 1/8/2025  # Neoplasm of uncertain behavior: right shoulder, s/p shave biopsy 1/8/2025     Personal Hx: No personal history of skin cancer  Family Hx: Positive for non melanoma skin cancer, brother.  _________________________________________    Assessment & Plan:     # Neoplasm of uncertain behavior:  right shoulder.  DDx includes irritated seborrheic keratosis vs clear cell acanthoma vs NMSC. Shave biopsy today.  Photograph obtained.  Procedure Note: Biopsy by shave technique  The risks and benefits of the procedure were described to the patient. These include but are not limited to bleeding, infection, scar, incomplete removal, and non-diagnostic biopsy. Verbal informed consent was obtained. The above site(s) was cleansed with an alcohol pad and injected with 1% lidocaine with epinephrine. Once anesthesia was obtained, a biopsy(ies) was performed with Gilette blade. The tissue(s) was placed in a labeled container(s) with formalin and sent to pathology. Hemostasis was achieved with aluminum chloride. Vaseline and a bandage were applied to the wound(s). The patient tolerated the procedure well and was given post biopsy care instructions.    # Spider veins, left lower extremity  -Vascular medicine referral placed per patient preference.    # Multiple nevi, trunk and extremities  # Solar lentigines  - Continued observation recommended.   - Nevi demonstrate no concerning features on dermoscopy. We discussed the importance of self exams at home.    - ABCDEs: Counseled ABCDEs of melanoma: Asymmetry, Border (irregularity), Color (not uniform, changes in color), Diameter (greater than 6 mm which is about the size of a pencil eraser), and Evolving (any changes in preexisting moles).  - Sun protection: Counseled SPF 30+ sunscreen, UPF clothing, sun avoidance, tanning bed avoidance.    # Cherry angiomas  # Seborrheic keratoses  - We discussed the benign nature of the skin lesions. No treatment required. Continued observation recommended. Follow up with any concerns.      Follow-up: 1 year(s) for follow up full body skin exam, prn for new or changing lesions or new concerns    All risks, benefits and alternatives were discussed with patient.  Patient is in agreement and understands the assessment and plan.  All questions were answered.  Kiara Johnson PA-C  [facility]  ____________________________________________    CC: Skin cancer screening exam    HPI:  Ms. Joycelyn Laird is a(n) 59 year old female who presents today as a return patient for a full body skin cancer screening. Today, patient has no specific cutaneous concerns today. No lesions that itch, bleed, or cause pain.     Patient is diligent with photoprotection. History of few sun burns and minimal tanning bed use. Patient is otherwise feeling well, without additional skin concerns.     Physical Exam:  Vitals: LMP 10/29/2011 (Exact Date)   SKIN: Total skin excluding the genitalia areas was performed. The exam included the head/face, neck, both arms, chest, back, abdomen, both legs, digits, mons pubis, buttock and nails.   -Ho I  -right shoulder, 3 mm well demarcated erythematous pink papule with few irregular vessels on dermoscopy  -multiple tan/brown flat round macules and raised papules scattered throughout trunk, extremities, and face. No worrisome features for malignancy noted on examination.  -scattered tan, homogenous macules scattered on sun exposed areas of trunk,  extremities, and face  -scattered waxy, stuck on tan/brown papules and patches on the trunk and extremities  -several 1-2mm red dome shaped symmetric papules scattered on the trunk and extremities    - No other lesions of concern on areas examined.       Medications:  Current Outpatient Medications   Medication Sig Dispense Refill     calcium carbonate 500 mg, elemental, 1250 (500 Ca) MG tablet chewable Take 1 tablet by mouth       fish oil-omega-3 fatty acids 1000 MG capsule Take 1 g by mouth every other day  90 capsule 3     irbesartan-hydrochlorothiazide (AVALIDE) 150-12.5 MG tablet TAKE 1 TABLET BY MOUTH DAILY 90 tablet 3     latanoprost (XALATAN) 0.005 % ophthalmic solution Place 1 drop into both eyes daily       MULTIVITAMIN TABS   OR        triamcinolone (KENALOG) 0.1 % external ointment Apply topically 2 times daily To rash on body/hands as needed. 80 g 1     Vitamin D3 (CHOLECALCIFEROL) 25 mcg (1000 units) tablet        No current facility-administered medications for this visit.      Past Medical History:   Patient Active Problem List   Diagnosis     Fatty liver     Eczema     Glaucoma     Hypertension goal BP (blood pressure) < 140/90     Hyperlipidemia LDL goal <100     Prediabetes     Endometrial cancer (H)     Past Medical History:   Diagnosis Date     Eczema 07/18/2016     Endometrial cancer (H) 12/2023    [place] - S/P Hysterectomy 1/2024 - Endometrial endometroid carcinoma, stage 2     Fatty liver 2013     Glaucoma     Dr NICOLA Lofton     Hyperlipidemia LDL goal <100      Hypertension goal BP (blood pressure) < 140/90      Mild or unspecified pre-eclampsia, unspecified as to episode of care 07/1999    toxemia pregnancy-twins     Prediabetes      Primary localized osteoarthrosis, ankle and foot 10/18/2011       CC Referred Self, MD  No address on file on close of this encounter.      Again, thank you for allowing me to participate in the care of your patient.        Sincerely,        Barbie Johnson,  TOI    Electronically signed

## 2025-01-08 NOTE — TELEPHONE ENCOUNTER
Patient returned my call and scheduled consultation.    Gabriela GUAN [facility]

## 2025-01-08 NOTE — PROGRESS NOTES
[facility] Dermatology Note  Encounter Date: Jan 8, 2025  Office Visit     Reviewed patients past medical history and pertinent chart review prior to patients visit today.     Dermatology Problem List:  Last skin check: 1/8/2025  # Neoplasm of uncertain behavior: right shoulder, s/p shave biopsy 1/8/2025     Personal Hx: No personal history of skin cancer  Family Hx: Positive for non melanoma skin cancer, brother.  _________________________________________    Assessment & Plan:     # Neoplasm of uncertain behavior:  right shoulder.  DDx includes irritated seborrheic keratosis vs clear cell acanthoma vs NMSC. Shave biopsy today.  Photograph obtained.  Procedure Note: Biopsy by shave technique  The risks and benefits of the procedure were described to the patient. These include but are not limited to bleeding, infection, scar, incomplete removal, and non-diagnostic biopsy. Verbal informed consent was obtained. The above site(s) was cleansed with an alcohol pad and injected with 1% lidocaine with epinephrine. Once anesthesia was obtained, a biopsy(ies) was performed with Gilette blade. The tissue(s) was placed in a labeled container(s) with formalin and sent to pathology. Hemostasis was achieved with aluminum chloride. Vaseline and a bandage were applied to the wound(s). The patient tolerated the procedure well and was given post biopsy care instructions.    # Spider veins, left lower extremity  -Vascular medicine referral placed per patient preference.    # Multiple nevi, trunk and extremities  # Solar lentigines  - Continued observation recommended.   - Nevi demonstrate no concerning features on dermoscopy. We discussed the importance of self exams at home.   - ABCDEs: Counseled ABCDEs of melanoma: Asymmetry, Border (irregularity), Color (not uniform, changes in color), Diameter (greater than 6 mm which is about the size of a pencil eraser), and Evolving (any changes in preexisting moles).  - Sun  protection: Counseled SPF 30+ sunscreen, UPF clothing, sun avoidance, tanning bed avoidance.    # Cherry angiomas  # Seborrheic keratoses  - We discussed the benign nature of the skin lesions. No treatment required. Continued observation recommended. Follow up with any concerns.      Follow-up: 1 year(s) for follow up full body skin exam, prn for new or changing lesions or new concerns    All risks, benefits and alternatives were discussed with patient.  Patient is in agreement and understands the assessment and plan.  All questions were answered.  Kiara Johnson PA-C  [facility]  ____________________________________________    CC: Skin cancer screening exam    HPI:  Ms. Joycelyn Laird is a(n) 59 year old female who presents today as a return patient for a full body skin cancer screening. Today, patient has no specific cutaneous concerns today. No lesions that itch, bleed, or cause pain.     Patient is diligent with photoprotection. History of few sun burns and minimal tanning bed use. Patient is otherwise feeling well, without additional skin concerns.     Physical Exam:  Vitals: LMP 10/29/2011 (Exact Date)   SKIN: Total skin excluding the genitalia areas was performed. The exam included the head/face, neck, both arms, chest, back, abdomen, both legs, digits, mons pubis, buttock and nails.   -Ho I  -right shoulder, 3 mm well demarcated erythematous pink papule with few irregular vessels on dermoscopy  -multiple tan/brown flat round macules and raised papules scattered throughout trunk, extremities, and face. No worrisome features for malignancy noted on examination.  -scattered tan, homogenous macules scattered on sun exposed areas of trunk, extremities, and face  -scattered waxy, stuck on tan/brown papules and patches on the trunk and extremities  -several 1-2mm red dome shaped symmetric papules scattered on the trunk and extremities    - No other lesions of concern on areas examined.        Medications:  Current Outpatient Medications   Medication Sig Dispense Refill    calcium carbonate 500 mg, elemental, 1250 (500 Ca) MG tablet chewable Take 1 tablet by mouth      fish oil-omega-3 fatty acids 1000 MG capsule Take 1 g by mouth every other day  90 capsule 3    irbesartan-hydrochlorothiazide (AVALIDE) 150-12.5 MG tablet TAKE 1 TABLET BY MOUTH DAILY 90 tablet 3    latanoprost (XALATAN) 0.005 % ophthalmic solution Place 1 drop into both eyes daily      MULTIVITAMIN TABS   OR       triamcinolone (KENALOG) 0.1 % external ointment Apply topically 2 times daily To rash on body/hands as needed. 80 g 1    Vitamin D3 (CHOLECALCIFEROL) 25 mcg (1000 units) tablet        No current facility-administered medications for this visit.      Past Medical History:   Patient Active Problem List   Diagnosis    Fatty liver    Eczema    Glaucoma    Hypertension goal BP (blood pressure) < 140/90    Hyperlipidemia LDL goal <100    Prediabetes    Endometrial cancer (H)     Past Medical History:   Diagnosis Date    Eczema 07/18/2016    Endometrial cancer (H) 12/2023    [place] - S/P Hysterectomy 1/2024 - Endometrial endometroid carcinoma, stage 2    Fatty liver 2013    Glaucoma     Dr NICOLA Lofton    Hyperlipidemia LDL goal <100     Hypertension goal BP (blood pressure) < 140/90     Mild or unspecified pre-eclampsia, unspecified as to episode of care 07/1999    toxemia pregnancy-twins    Prediabetes     Primary localized osteoarthrosis, ankle and foot 10/18/2011       CC Referred Self, MD  No address on file on close of this encounter.

## 2025-01-08 NOTE — TELEPHONE ENCOUNTER
KYLE 1/8/25 to schedule consultation for spider vein of left lower extremity ref by Barbie Johnson PA-C [facility]

## 2025-01-21 ENCOUNTER — OFFICE VISIT (OUTPATIENT)
Dept: VASCULAR SURGERY | Facility: CLINIC | Age: 60
End: 2025-01-21
Payer: COMMERCIAL

## 2025-01-21 DIAGNOSIS — I78.1 SPIDER VEINS: Primary | ICD-10-CM

## 2025-01-21 PROCEDURE — 99202 OFFICE O/P NEW SF 15 MIN: CPT | Performed by: SURGERY

## 2025-01-21 NOTE — PROGRESS NOTES
VEIN SOLUTIONS CONSULT    Impression:  1.  Asymptomatic bilateral lower extremity spider veins.    2.  History of hysterectomy and BSO for endometrial adenocarcinoma 1/4/2024.    Plan:  I had a nice discussion with Joycelyn reviewing basic pathophysiology of spider veins.  We discussed potential cosmetic sclerotherapy.  I reviewed the specifics of this procedure including potential complications and the anticipated postprocedural course.  She understands that this is not medically necessary and that she would be financially responsible for any treatment.  She understands that she would need thigh-high compression stockings  post procedurally.  All of her questions were answered and she verbalizes full understanding to the above.  She will likely contact us sometime in the future to schedule cosmetic sclerotherapy.    Total length of this encounter was 15 minutes with time spent reviewing records, interviewing and examining the patient, answering questions, and coordinating a treatment plan.      HPI:   Joycelyn Laird is a 58-year-old female with stage Ia endometrial adenocarcinoma status post robotic assisted hysterectomy and BSO on 1/4/2024.  She is referred by Barbie Johnson PA-C in dermatology for evaluation of left leg spider veins.  She has a familial history of varicosities and spider veins in her mother.  She does not have any lower extremity venous symptoms.  She is simply interested in improved appearance of her legs.  She has no personal history of prior venous intervention.      CURRENT MEDICATIONS  Current Outpatient Medications   Medication Sig Dispense Refill    calcium carbonate 500 mg, elemental, 1250 (500 Ca) MG tablet chewable Take 1 tablet by mouth      fish oil-omega-3 fatty acids 1000 MG capsule Take 1 g by mouth every other day  90 capsule 3    irbesartan-hydrochlorothiazide (AVALIDE) 150-12.5 MG tablet TAKE 1 TABLET BY MOUTH DAILY 90 tablet 3    latanoprost (XALATAN) 0.005 % ophthalmic solution  Place 1 drop into both eyes daily      MULTIVITAMIN TABS   OR       triamcinolone (KENALOG) 0.1 % external ointment Apply topically 2 times daily To rash on body/hands as needed. 80 g 1    Vitamin D3 (CHOLECALCIFEROL) 25 mcg (1000 units) tablet        No current facility-administered medications for this visit.         PAST MEDICAL HISTORY  Past Medical History:   Diagnosis Date    Eczema 2016    Endometrial cancer (H) 2023    [place] - S/P Hysterectomy 2024 - Endometrial endometroid carcinoma, stage 2    Fatty liver     Glaucoma     Dr NICOLA Lofton    Hyperlipidemia LDL goal <100     Hypertension goal BP (blood pressure) < 140/90     Mild or unspecified pre-eclampsia, unspecified as to episode of care 1999    toxemia pregnancy-twins    Prediabetes     Primary localized osteoarthrosis, ankle and foot 10/18/2011         PAST SURGICAL HISTORY:  Past Surgical History:   Procedure Laterality Date    BIOPSY  11/15/17 & 24    biopsy of endometrium of uterus - no cancer in . In  it was diagnoses as Stage 2 endometrial cancer     SECTION       - twins    COLONOSCOPY N/A 2017    normal, due 5 yrs    COLONOSCOPY N/A 2024    normal / negative - due 10 yrs    DENTAL SURGERY      wisdom tooth    GENITOURINARY SURGERY  1/3/18    hysteroscopy - for polups - non cancerous    GYN SURGERY  2024    hysterectomy  - for endometrial cancer    HYSTERECTOMY, PAP NO LONGER INDICATED      HYSTERECTOMY, PAP NO LONGER INDICATED      SURGICAL HISTORY OF -   2018    endometrial biopsy benign - Ob/Gyn - Dr Moore       ALLERGIES   No Known Allergies    FAMILY HISTORY  Family History   Problem Relation Age of Onset    Hypertension Mother     Hyperlipidemia Mother     Atrial fibrillation Mother     Dementia Mother     Hypertension Father     C.A.D. Father 68    Prostate Cancer Father         Prostrate removed    Peripheral Vascular Disease Father     Hyperlipidemia Father      Skin Cancer Brother     Crohn's Disease Brother         had colectomy due to premalignant changes    Coronary Artery Disease Paternal Grandfather         Heart valve issues    Other - See Comments Yonny Gilmore    Other - See Comments Yonny Gilmore    Colon Cancer No family hx of        SOCIAL HISTORY  Social History     Tobacco Use    Smoking status: Never    Smokeless tobacco: Never   Vaping Use    Vaping status: Never Used   Substance Use Topics    Alcohol use: Yes     Alcohol/week: 0.0 - 1.0 standard drinks of alcohol     Comment: occas    Drug use: No       ROS:   Review of Systems   All other systems reviewed and are negative.        EXAM:  LMP 10/29/2011 (Exact Date)   Physical Exam  Constitutional:       Appearance: She is obese.   HENT:      Head: Normocephalic and atraumatic.   Eyes:      General: No scleral icterus.     Extraocular Movements: Extraocular movements intact.      Pupils: Pupils are equal, round, and reactive to light.   Cardiovascular:      Pulses:           Dorsalis pedis pulses are 2+ on the right side and 2+ on the left side.      Comments: Clusters of spider veins on the medial aspect of the distal right thigh and on her bilateral lateral legs.  No obvious varicosities.  Musculoskeletal:         General: Normal range of motion.      Cervical back: Normal range of motion.   Skin:     General: Skin is warm and dry.   Neurological:      General: No focal deficit present.      Mental Status: She is alert and oriented to person, place, and time. Mental status is at baseline.   Psychiatric:         Mood and Affect: Mood normal.         Behavior: Behavior normal.         Thought Content: Thought content normal.         Judgment: Judgment normal.         Labs:  LIPID RESULTS:  Lab Results   Component Value Date    CHOL 183 11/26/2024    CHOL 159 09/21/2020    HDL 42 (L) 11/26/2024    HDL 42 (L) 09/21/2020     (H) 11/26/2024     (H) 09/21/2020    TRIG 115 11/26/2024     TRIG 73 09/21/2020    CHOLHDLRATIO 5.3 (H) 07/16/2015       CBC RESULTS:  Lab Results   Component Value Date    WBC 3.8 (L) 11/26/2024    WBC 6.1 12/27/2017    RBC 4.46 11/26/2024    RBC 4.92 12/27/2017    HGB 13.4 11/26/2024    HGB 14.3 12/27/2017    HCT 38.5 11/26/2024    HCT 42.9 12/27/2017    MCV 86 11/26/2024    MCV 87 12/27/2017    MCH 30.0 11/26/2024    MCH 29.1 12/27/2017    MCHC 34.8 11/26/2024    MCHC 33.3 12/27/2017    RDW 11.4 11/26/2024    RDW 12.1 12/27/2017     11/26/2024     12/27/2017       BMP RESULTS:  Lab Results   Component Value Date     11/26/2024     09/21/2020    POTASSIUM 4.2 11/26/2024    POTASSIUM 4.0 10/27/2022    POTASSIUM 3.7 09/21/2020    CHLORIDE 103 11/26/2024    CHLORIDE 109 10/27/2022    CHLORIDE 108 09/21/2020    CO2 27 11/26/2024    CO2 26 10/27/2022    CO2 27 09/21/2020    ANIONGAP 10 11/26/2024    ANIONGAP 6 10/27/2022    ANIONGAP 5 09/21/2020     (H) 11/26/2024     (H) 10/27/2022     (H) 09/21/2020    BUN 13.1 11/26/2024    BUN 11 10/27/2022    BUN 12 09/21/2020    CR 0.76 11/26/2024    CR 0.70 09/21/2020    GFRESTIMATED >90 11/26/2024    GFRESTIMATED >90 09/21/2020    GFRESTBLACK >90 09/21/2020    RAINER 9.8 11/26/2024    RAINER 9.4 09/21/2020        A1C RESULTS:  Lab Results   Component Value Date    A1C 5.6 11/26/2024    A1C 5.6 (A) 04/12/2018         Imaging:  No results found for this or any previous visit (from the past 24 hours).        Mannie Warren MD

## 2025-01-21 NOTE — LETTER
1/21/2025      Joycelyn Laird  [address] [facility] [address]      Dear Colleague,    Thank you for referring your patient, Joycelyn Laird, to the [facility] [place]. Please see a copy of my visit note below.    VEIN SOLUTIONS CONSULT    Impression:  1.  Asymptomatic bilateral lower extremity spider veins.    2.  History of hysterectomy and BSO for endometrial adenocarcinoma 1/4/2024.    Plan:  I had a nice discussion with Joycelyn reviewing basic pathophysiology of spider veins.  We discussed potential cosmetic sclerotherapy.  I reviewed the specifics of this procedure including potential complications and the anticipated postprocedural course.  She understands that this is not medically necessary and that she would be financially responsible for any treatment.  She understands that she would need thigh-high compression stockings  post procedurally.  All of her questions were answered and she verbalizes full understanding to the above.  She will likely contact us sometime in the future to schedule cosmetic sclerotherapy.    Total length of this encounter was 15 minutes with time spent reviewing records, interviewing and examining the patient, answering questions, and coordinating a treatment plan.      HPI:   Joycelyn Laird is a 58-year-old female with stage Ia endometrial adenocarcinoma status post robotic assisted hysterectomy and BSO on 1/4/2024.  She is referred by Barbie Johnson PA-C in dermatology for evaluation of left leg spider veins.  She has a familial history of varicosities and spider veins in her mother.  She does not have any lower extremity venous symptoms.  She is simply interested in improved appearance of her legs.  She has no personal history of prior venous intervention.      CURRENT MEDICATIONS  Current Outpatient Medications   Medication Sig Dispense Refill     calcium carbonate 500 mg, elemental, 1250 (500 Ca) MG tablet chewable Take 1 tablet by mouth       fish  oil-omega-3 fatty acids 1000 MG capsule Take 1 g by mouth every other day  90 capsule 3     irbesartan-hydrochlorothiazide (AVALIDE) 150-12.5 MG tablet TAKE 1 TABLET BY MOUTH DAILY 90 tablet 3     latanoprost (XALATAN) 0.005 % ophthalmic solution Place 1 drop into both eyes daily       MULTIVITAMIN TABS   OR        triamcinolone (KENALOG) 0.1 % external ointment Apply topically 2 times daily To rash on body/hands as needed. 80 g 1     Vitamin D3 (CHOLECALCIFEROL) 25 mcg (1000 units) tablet        No current facility-administered medications for this visit.         PAST MEDICAL HISTORY  Past Medical History:   Diagnosis Date     Eczema 2016     Endometrial cancer (H) 2023    [place] - S/P Hysterectomy 2024 - Endometrial endometroid carcinoma, stage 2     Fatty liver      Glaucoma     Dr NICOLA Lofton     Hyperlipidemia LDL goal <100      Hypertension goal BP (blood pressure) < 140/90      Mild or unspecified pre-eclampsia, unspecified as to episode of care 1999    toxemia pregnancy-twins     Prediabetes      Primary localized osteoarthrosis, ankle and foot 10/18/2011         PAST SURGICAL HISTORY:  Past Surgical History:   Procedure Laterality Date     BIOPSY  11/15/17 & 24    biopsy of endometrium of uterus - no cancer in . In  it was diagnoses as Stage 2 endometrial cancer      SECTION       - twins     COLONOSCOPY N/A 2017    normal, due 5 yrs     COLONOSCOPY N/A 2024    normal / negative - due 10 yrs     DENTAL SURGERY      wisdom tooth     GENITOURINARY SURGERY  1/3/18    hysteroscopy - for polups - non cancerous     GYN SURGERY  2024    hysterectomy  - for endometrial cancer     HYSTERECTOMY, PAP NO LONGER INDICATED       HYSTERECTOMY, PAP NO LONGER INDICATED       SURGICAL HISTORY OF -   2018    endometrial biopsy benign - Ob/Gyn - Dr Moore       ALLERGIES   No Known Allergies    FAMILY HISTORY  Family History   Problem Relation Age of  Onset     Hypertension Mother      Hyperlipidemia Mother      Atrial fibrillation Mother      Dementia Mother      Hypertension Father      C.A.D. Father 68     Prostate Cancer Father         Prostrate removed     Peripheral Vascular Disease Father      Hyperlipidemia Father      Skin Cancer Brother      Crohn's Disease Brother         had colectomy due to premalignant changes     Coronary Artery Disease Paternal Grandfather         Heart valve issues     Other - See Comments Yonny Gilmore     Other - See Comments Yonny Gilmore     Colon Cancer No family hx of        SOCIAL HISTORY  Social History     Tobacco Use     Smoking status: Never     Smokeless tobacco: Never   Vaping Use     Vaping status: Never Used   Substance Use Topics     Alcohol use: Yes     Alcohol/week: 0.0 - 1.0 standard drinks of alcohol     Comment: occas     Drug use: No       ROS:   Review of Systems   All other systems reviewed and are negative.        EXAM:  LMP 10/29/2011 (Exact Date)   Physical Exam  Constitutional:       Appearance: She is obese.   HENT:      Head: Normocephalic and atraumatic.   Eyes:      General: No scleral icterus.     Extraocular Movements: Extraocular movements intact.      Pupils: Pupils are equal, round, and reactive to light.   Cardiovascular:      Pulses:           Dorsalis pedis pulses are 2+ on the right side and 2+ on the left side.      Comments: Clusters of spider veins on the medial aspect of the distal right thigh and on her bilateral lateral legs.  No obvious varicosities.  Musculoskeletal:         General: Normal range of motion.      Cervical back: Normal range of motion.   Skin:     General: Skin is warm and dry.   Neurological:      General: No focal deficit present.      Mental Status: She is alert and oriented to person, place, and time. Mental status is at baseline.   Psychiatric:         Mood and Affect: Mood normal.         Behavior: Behavior normal.         Thought Content:  Thought content normal.         Judgment: Judgment normal.         Labs:  LIPID RESULTS:  Lab Results   Component Value Date    CHOL 183 11/26/2024    CHOL 159 09/21/2020    HDL 42 (L) 11/26/2024    HDL 42 (L) 09/21/2020     (H) 11/26/2024     (H) 09/21/2020    TRIG 115 11/26/2024    TRIG 73 09/21/2020    CHOLHDLRATIO 5.3 (H) 07/16/2015       CBC RESULTS:  Lab Results   Component Value Date    WBC 3.8 (L) 11/26/2024    WBC 6.1 12/27/2017    RBC 4.46 11/26/2024    RBC 4.92 12/27/2017    HGB 13.4 11/26/2024    HGB 14.3 12/27/2017    HCT 38.5 11/26/2024    HCT 42.9 12/27/2017    MCV 86 11/26/2024    MCV 87 12/27/2017    MCH 30.0 11/26/2024    MCH 29.1 12/27/2017    MCHC 34.8 11/26/2024    MCHC 33.3 12/27/2017    RDW 11.4 11/26/2024    RDW 12.1 12/27/2017     11/26/2024     12/27/2017       BMP RESULTS:  Lab Results   Component Value Date     11/26/2024     09/21/2020    POTASSIUM 4.2 11/26/2024    POTASSIUM 4.0 10/27/2022    POTASSIUM 3.7 09/21/2020    CHLORIDE 103 11/26/2024    CHLORIDE 109 10/27/2022    CHLORIDE 108 09/21/2020    CO2 27 11/26/2024    CO2 26 10/27/2022    CO2 27 09/21/2020    ANIONGAP 10 11/26/2024    ANIONGAP 6 10/27/2022    ANIONGAP 5 09/21/2020     (H) 11/26/2024     (H) 10/27/2022     (H) 09/21/2020    BUN 13.1 11/26/2024    BUN 11 10/27/2022    BUN 12 09/21/2020    CR 0.76 11/26/2024    CR 0.70 09/21/2020    GFRESTIMATED >90 11/26/2024    GFRESTIMATED >90 09/21/2020    GFRESTBLACK >90 09/21/2020    RAINER 9.8 11/26/2024    RAINER 9.4 09/21/2020        A1C RESULTS:  Lab Results   Component Value Date    A1C 5.6 11/26/2024    A1C 5.6 (A) 04/12/2018         Imaging:  No results found for this or any previous visit (from the past 24 hours).        Mannie Warren MD        Again, thank you for allowing me to participate in the care of your patient.        Sincerely,        Mannie Warren MD    Electronically signed

## 2025-01-21 NOTE — PROGRESS NOTES
After Visit Follow Up  Per Dr. Warren, patient was recommended to have 2 - 3 sessions at $428 of cosmetic sclerotherapy.    Reviewed with and gave to patient our vein clinic sclerotherapy packet of information which includes basic sclerotherapy information, pre-treatment instructions and post-treatment instructions.    Patient in agreement with plan and had no further questions.    Shelley Hurd RN  [facility]  Vein Clinic

## 2025-03-18 ENCOUNTER — HOSPITAL ENCOUNTER (OUTPATIENT)
Dept: MAMMOGRAPHY | Facility: CLINIC | Age: 60
Discharge: HOME | End: 2025-03-18
Attending: FAMILY MEDICINE | Admitting: FAMILY MEDICINE
Payer: COMMERCIAL

## 2025-03-18 DIAGNOSIS — Z12.31 ENCOUNTER FOR SCREENING MAMMOGRAM FOR MALIGNANT NEOPLASM OF BREAST: ICD-10-CM

## 2025-03-18 PROCEDURE — 77063 BREAST TOMOSYNTHESIS BI: CPT

## 2025-03-24 ENCOUNTER — LAB (OUTPATIENT)
Dept: LAB | Facility: CLINIC | Age: 60
End: 2025-03-24
Payer: COMMERCIAL

## 2025-03-24 DIAGNOSIS — I10 HYPERTENSION GOAL BP (BLOOD PRESSURE) < 140/90: ICD-10-CM

## 2025-03-24 DIAGNOSIS — Z51.81 MEDICATION MONITORING ENCOUNTER: ICD-10-CM

## 2025-03-24 DIAGNOSIS — R73.03 PREDIABETES: ICD-10-CM

## 2025-03-24 DIAGNOSIS — E78.5 HYPERLIPIDEMIA LDL GOAL <100: ICD-10-CM

## 2025-03-24 LAB
ALBUMIN SERPL BCG-MCNC: 4.4 G/DL (ref 3.5–5.2)
ALP SERPL-CCNC: 58 U/L (ref 40–150)
ALT SERPL W P-5'-P-CCNC: 24 U/L (ref 0–50)
ANION GAP SERPL CALCULATED.3IONS-SCNC: 11 MMOL/L (ref 7–15)
AST SERPL W P-5'-P-CCNC: 28 U/L (ref 0–45)
BILIRUB SERPL-MCNC: 0.5 MG/DL
BUN SERPL-MCNC: 16.3 MG/DL (ref 6–20)
CALCIUM SERPL-MCNC: 9.6 MG/DL (ref 8.8–10.4)
CHLORIDE SERPL-SCNC: 104 MMOL/L (ref 98–107)
CHOLEST SERPL-MCNC: 189 MG/DL
CK SERPL-CCNC: 148 U/L (ref 26–192)
CREAT SERPL-MCNC: 0.78 MG/DL (ref 0.51–0.95)
EGFRCR SERPLBLD CKD-EPI 2021: 88 ML/MIN/1.73M2
EST. AVERAGE GLUCOSE BLD GHB EST-MCNC: 105 MG/DL
FASTING STATUS PATIENT QL REPORTED: YES
FASTING STATUS PATIENT QL REPORTED: YES
GLUCOSE SERPL-MCNC: 107 MG/DL (ref 70–99)
HBA1C MFR BLD: 5.3 % (ref 0–5.6)
HCO3 SERPL-SCNC: 25 MMOL/L (ref 22–29)
HDLC SERPL-MCNC: 41 MG/DL
LDLC SERPL CALC-MCNC: 129 MG/DL
NONHDLC SERPL-MCNC: 148 MG/DL
POTASSIUM SERPL-SCNC: 4 MMOL/L (ref 3.4–5.3)
PROT SERPL-MCNC: 7.5 G/DL (ref 6.4–8.3)
SODIUM SERPL-SCNC: 140 MMOL/L (ref 135–145)
TRIGL SERPL-MCNC: 96 MG/DL

## 2025-03-24 PROCEDURE — 83036 HEMOGLOBIN GLYCOSYLATED A1C: CPT

## 2025-03-24 PROCEDURE — 36415 COLL VENOUS BLD VENIPUNCTURE: CPT

## 2025-03-24 PROCEDURE — 80053 COMPREHEN METABOLIC PANEL: CPT

## 2025-03-24 PROCEDURE — 82550 ASSAY OF CK (CPK): CPT

## 2025-03-24 PROCEDURE — 80061 LIPID PANEL: CPT

## 2025-04-01 ENCOUNTER — MYC MEDICAL ADVICE (OUTPATIENT)
Dept: FAMILY MEDICINE | Facility: CLINIC | Age: 60
End: 2025-04-01
Payer: COMMERCIAL

## 2025-04-01 DIAGNOSIS — Z51.81 MEDICATION MONITORING ENCOUNTER: ICD-10-CM

## 2025-04-01 DIAGNOSIS — R73.03 PREDIABETES: ICD-10-CM

## 2025-04-01 DIAGNOSIS — E78.5 HYPERLIPIDEMIA LDL GOAL <100: Primary | ICD-10-CM

## 2025-04-01 DIAGNOSIS — I10 HYPERTENSION GOAL BP (BLOOD PRESSURE) < 140/90: ICD-10-CM

## 2025-04-02 NOTE — TELEPHONE ENCOUNTER
Please review with patient    Prediabetes  LDL (bad cholesterol) above goal of < 100    Consider rosuvastatin 10 mg daily (#90, r x 3)  Balanced low cholesterol diet, Mediterranean diet  Regular exercise, 150 minutes per week.  Weight loss.    Recheck fasting labs in 3 months

## 2025-07-01 ENCOUNTER — LAB (OUTPATIENT)
Dept: LAB | Facility: CLINIC | Age: 60
End: 2025-07-01
Payer: COMMERCIAL

## 2025-07-01 DIAGNOSIS — R73.03 PREDIABETES: ICD-10-CM

## 2025-07-01 DIAGNOSIS — I10 HYPERTENSION GOAL BP (BLOOD PRESSURE) < 140/90: ICD-10-CM

## 2025-07-01 DIAGNOSIS — E78.5 HYPERLIPIDEMIA LDL GOAL <100: ICD-10-CM

## 2025-07-01 DIAGNOSIS — Z51.81 MEDICATION MONITORING ENCOUNTER: ICD-10-CM

## 2025-07-01 LAB
ALBUMIN SERPL BCG-MCNC: 4.2 G/DL (ref 3.5–5.2)
ALP SERPL-CCNC: 60 U/L (ref 40–150)
ALT SERPL W P-5'-P-CCNC: 26 U/L (ref 0–50)
ANION GAP SERPL CALCULATED.3IONS-SCNC: 9 MMOL/L (ref 7–15)
AST SERPL W P-5'-P-CCNC: 32 U/L (ref 0–45)
BILIRUB SERPL-MCNC: 0.5 MG/DL
BUN SERPL-MCNC: 15 MG/DL (ref 6–20)
CALCIUM SERPL-MCNC: 9.4 MG/DL (ref 8.8–10.4)
CHLORIDE SERPL-SCNC: 103 MMOL/L (ref 98–107)
CHOLEST SERPL-MCNC: 186 MG/DL
CK SERPL-CCNC: 169 U/L (ref 26–192)
CREAT SERPL-MCNC: 0.75 MG/DL (ref 0.51–0.95)
EGFRCR SERPLBLD CKD-EPI 2021: >90 ML/MIN/1.73M2
EST. AVERAGE GLUCOSE BLD GHB EST-MCNC: 105 MG/DL
FASTING STATUS PATIENT QL REPORTED: YES
FASTING STATUS PATIENT QL REPORTED: YES
GLUCOSE SERPL-MCNC: 99 MG/DL (ref 70–99)
HBA1C MFR BLD: 5.3 % (ref 0–5.6)
HCO3 SERPL-SCNC: 27 MMOL/L (ref 22–29)
HDLC SERPL-MCNC: 39 MG/DL
LDLC SERPL CALC-MCNC: 125 MG/DL
NONHDLC SERPL-MCNC: 147 MG/DL
POTASSIUM SERPL-SCNC: 4.5 MMOL/L (ref 3.4–5.3)
PROT SERPL-MCNC: 7.2 G/DL (ref 6.4–8.3)
SODIUM SERPL-SCNC: 139 MMOL/L (ref 135–145)
TRIGL SERPL-MCNC: 109 MG/DL

## 2025-07-01 PROCEDURE — 83036 HEMOGLOBIN GLYCOSYLATED A1C: CPT

## 2025-07-01 PROCEDURE — 80053 COMPREHEN METABOLIC PANEL: CPT

## 2025-07-01 PROCEDURE — 82550 ASSAY OF CK (CPK): CPT

## 2025-07-01 PROCEDURE — 80061 LIPID PANEL: CPT

## 2025-07-01 PROCEDURE — 36415 COLL VENOUS BLD VENIPUNCTURE: CPT

## 2025-07-04 ENCOUNTER — RESULTS FOLLOW-UP (OUTPATIENT)
Dept: FAMILY MEDICINE | Facility: CLINIC | Age: 60
End: 2025-07-04

## 2025-07-04 DIAGNOSIS — E78.5 HYPERLIPIDEMIA LDL GOAL <100: ICD-10-CM

## 2025-07-04 DIAGNOSIS — Z51.81 MEDICATION MONITORING ENCOUNTER: ICD-10-CM

## 2025-07-04 DIAGNOSIS — E66.811 CLASS 1 OBESITY WITH SERIOUS COMORBIDITY AND BODY MASS INDEX (BMI) OF 32.0 TO 32.9 IN ADULT, UNSPECIFIED OBESITY TYPE: ICD-10-CM

## 2025-07-04 DIAGNOSIS — R73.03 PREDIABETES: Primary | ICD-10-CM

## 2025-07-04 DIAGNOSIS — I10 HYPERTENSION GOAL BP (BLOOD PRESSURE) < 140/90: ICD-10-CM

## 2025-07-07 RX ORDER — ROSUVASTATIN CALCIUM 10 MG/1
5 TABLET, COATED ORAL DAILY
Qty: 90 TABLET | Refills: 3 | Status: SHIPPED | OUTPATIENT
Start: 2025-07-07

## 2025-07-08 ENCOUNTER — PATIENT OUTREACH (OUTPATIENT)
Dept: CARE COORDINATION | Facility: CLINIC | Age: 60
End: 2025-07-08
Payer: COMMERCIAL

## 2025-08-05 ENCOUNTER — PATIENT OUTREACH (OUTPATIENT)
Dept: CARE COORDINATION | Facility: CLINIC | Age: 60
End: 2025-08-05
Payer: COMMERCIAL

## 2025-08-25 ENCOUNTER — MYC REFILL (OUTPATIENT)
Dept: FAMILY MEDICINE | Facility: CLINIC | Age: 60
End: 2025-08-25
Payer: COMMERCIAL

## 2025-08-25 DIAGNOSIS — R73.03 PREDIABETES: ICD-10-CM

## 2025-08-25 DIAGNOSIS — I10 HYPERTENSION GOAL BP (BLOOD PRESSURE) < 140/90: ICD-10-CM

## 2025-08-25 RX ORDER — IRBESARTAN AND HYDROCHLOROTHIAZIDE 150; 12.5 MG/1; MG/1
TABLET, FILM COATED ORAL
Qty: 90 TABLET | Refills: 3 | OUTPATIENT
Start: 2025-08-25

## (undated) DEVICE — KIT ENDO TURNOVER/PROCEDURE W/CLEAN A SCOPE LINERS 103888

## (undated) RX ORDER — FENTANYL CITRATE 50 UG/ML
INJECTION, SOLUTION INTRAMUSCULAR; INTRAVENOUS
Status: DISPENSED
Start: 2024-05-21

## (undated) RX ORDER — FENTANYL CITRATE 50 UG/ML
INJECTION, SOLUTION INTRAMUSCULAR; INTRAVENOUS
Status: DISPENSED
Start: 2017-12-01

## (undated) RX ORDER — ATROPINE SULFATE 0.1 MG/ML
INJECTION INTRAVENOUS
Status: DISPENSED
Start: 2024-05-21